# Patient Record
Sex: FEMALE | Race: WHITE | Employment: OTHER
[De-identification: names, ages, dates, MRNs, and addresses within clinical notes are randomized per-mention and may not be internally consistent; named-entity substitution may affect disease eponyms.]

---

## 2017-01-01 ENCOUNTER — SURGERY (OUTPATIENT)
Age: 67
End: 2017-01-01

## 2017-01-01 ENCOUNTER — TELEPHONE (OUTPATIENT)
Dept: GASTROENTEROLOGY | Facility: CLINIC | Age: 67
End: 2017-01-01

## 2017-01-01 ENCOUNTER — ANESTHESIA EVENT (OUTPATIENT)
Dept: SURGERY | Facility: CLINIC | Age: 67
End: 2017-01-01
Payer: MEDICARE

## 2017-01-01 ENCOUNTER — HOSPITAL ENCOUNTER (OUTPATIENT)
Facility: CLINIC | Age: 67
End: 2017-01-01
Attending: INTERNAL MEDICINE | Admitting: INTERNAL MEDICINE
Payer: MEDICARE

## 2017-01-01 ENCOUNTER — ANESTHESIA (OUTPATIENT)
Dept: SURGERY | Facility: CLINIC | Age: 67
End: 2017-01-01
Payer: MEDICARE

## 2017-01-01 ENCOUNTER — TRANSFERRED RECORDS (OUTPATIENT)
Dept: HEALTH INFORMATION MANAGEMENT | Facility: CLINIC | Age: 67
End: 2017-01-01

## 2017-01-01 ENCOUNTER — HOSPITAL ENCOUNTER (OUTPATIENT)
Facility: CLINIC | Age: 67
Discharge: HOME | End: 2017-08-10
Attending: INTERNAL MEDICINE | Admitting: INTERNAL MEDICINE
Payer: MEDICARE

## 2017-01-01 ENCOUNTER — CARE COORDINATION (OUTPATIENT)
Dept: GASTROENTEROLOGY | Facility: CLINIC | Age: 67
End: 2017-01-01

## 2017-01-01 ENCOUNTER — HOSPITAL ENCOUNTER (OUTPATIENT)
Facility: CLINIC | Age: 67
Discharge: HOME | End: 2017-08-28
Attending: INTERNAL MEDICINE | Admitting: INTERNAL MEDICINE
Payer: MEDICARE

## 2017-01-01 ENCOUNTER — OFFICE VISIT (OUTPATIENT)
Dept: GASTROENTEROLOGY | Facility: CLINIC | Age: 67
End: 2017-01-01
Attending: INTERNAL MEDICINE
Payer: MEDICARE

## 2017-01-01 ENCOUNTER — APPOINTMENT (OUTPATIENT)
Dept: GENERAL RADIOLOGY | Facility: CLINIC | Age: 67
End: 2017-01-01
Attending: INTERNAL MEDICINE
Payer: MEDICARE

## 2017-01-01 ENCOUNTER — ANESTHESIA EVENT (OUTPATIENT)
Dept: GASTROENTEROLOGY | Facility: CLINIC | Age: 67
End: 2017-01-01
Payer: MEDICARE

## 2017-01-01 ENCOUNTER — HOSPITAL ENCOUNTER (OUTPATIENT)
Facility: CLINIC | Age: 67
Discharge: HOME | End: 2017-07-05
Attending: INTERNAL MEDICINE | Admitting: INTERNAL MEDICINE
Payer: MEDICARE

## 2017-01-01 ENCOUNTER — ANESTHESIA (OUTPATIENT)
Dept: GASTROENTEROLOGY | Facility: CLINIC | Age: 67
End: 2017-01-01
Payer: MEDICARE

## 2017-01-01 VITALS
HEIGHT: 64 IN | BODY MASS INDEX: 34.97 KG/M2 | SYSTOLIC BLOOD PRESSURE: 116 MMHG | DIASTOLIC BLOOD PRESSURE: 78 MMHG | OXYGEN SATURATION: 94 % | TEMPERATURE: 97.2 F | RESPIRATION RATE: 16 BRPM | WEIGHT: 204.81 LBS

## 2017-01-01 VITALS
SYSTOLIC BLOOD PRESSURE: 127 MMHG | OXYGEN SATURATION: 94 % | DIASTOLIC BLOOD PRESSURE: 71 MMHG | TEMPERATURE: 98.4 F | HEART RATE: 62 BPM | HEIGHT: 64 IN | WEIGHT: 196.65 LBS | RESPIRATION RATE: 16 BRPM | BODY MASS INDEX: 33.57 KG/M2

## 2017-01-01 VITALS
WEIGHT: 195 LBS | HEART RATE: 77 BPM | RESPIRATION RATE: 16 BRPM | SYSTOLIC BLOOD PRESSURE: 148 MMHG | TEMPERATURE: 98.5 F | DIASTOLIC BLOOD PRESSURE: 89 MMHG | BODY MASS INDEX: 33.29 KG/M2 | HEIGHT: 64 IN | OXYGEN SATURATION: 98 %

## 2017-01-01 VITALS
HEART RATE: 60 BPM | HEIGHT: 64 IN | RESPIRATION RATE: 17 BRPM | SYSTOLIC BLOOD PRESSURE: 118 MMHG | BODY MASS INDEX: 34.16 KG/M2 | DIASTOLIC BLOOD PRESSURE: 50 MMHG | WEIGHT: 200.1 LBS | OXYGEN SATURATION: 95 % | TEMPERATURE: 97.5 F

## 2017-01-01 DIAGNOSIS — K83.1 BILIARY STRICTURE OF TRANSPLANTED LIVER (H): Primary | ICD-10-CM

## 2017-01-01 DIAGNOSIS — T86.49: Primary | ICD-10-CM

## 2017-01-01 DIAGNOSIS — K74.3 PRIMARY BILIARY CHOLANGITIS (H): Primary | ICD-10-CM

## 2017-01-01 DIAGNOSIS — Z94.4 LIVER TRANSPLANTED (H): Primary | ICD-10-CM

## 2017-01-01 DIAGNOSIS — I85.11 SECONDARY ESOPHAGEAL VARICES WITH BLEEDING (H): Primary | ICD-10-CM

## 2017-01-01 DIAGNOSIS — Z94.4 LIVER REPLACED BY TRANSPLANT (H): ICD-10-CM

## 2017-01-01 DIAGNOSIS — Z94.4 LIVER REPLACED BY TRANSPLANT (H): Primary | ICD-10-CM

## 2017-01-01 DIAGNOSIS — T86.49 BILIARY STRICTURE OF TRANSPLANTED LIVER (H): Primary | ICD-10-CM

## 2017-01-01 DIAGNOSIS — M19.019 SHOULDER ARTHRITIS: ICD-10-CM

## 2017-01-01 DIAGNOSIS — K83.09 CHOLANGITIS (H): Primary | ICD-10-CM

## 2017-01-01 DIAGNOSIS — K83.1: Primary | ICD-10-CM

## 2017-01-01 LAB
ALBUMIN SERPL-MCNC: 3.2 G/DL (ref 3.4–5)
ALBUMIN SERPL-MCNC: 3.3 G/DL (ref 3.4–5)
ALBUMIN SERPL-MCNC: 3.4 G/DL (ref 3.4–5)
ALBUMIN SERPL-MCNC: 3.7 G/DL (ref 3.4–5)
ALBUMIN UR-MCNC: NEGATIVE MG/DL
ALP SERPL-CCNC: 148 U/L (ref 40–150)
ALP SERPL-CCNC: 157 U/L (ref 40–150)
ALP SERPL-CCNC: 161 U/L (ref 40–150)
ALP SERPL-CCNC: 266 U/L (ref 40–150)
ALT SERPL W P-5'-P-CCNC: 33 U/L (ref 0–50)
ALT SERPL W P-5'-P-CCNC: 34 U/L (ref 0–50)
ALT SERPL W P-5'-P-CCNC: 37 U/L (ref 0–50)
ALT SERPL W P-5'-P-CCNC: 48 U/L (ref 0–50)
AMYLASE SERPL-CCNC: 34 U/L (ref 30–110)
AMYLASE SERPL-CCNC: 34 U/L (ref 30–110)
ANION GAP SERPL CALCULATED.3IONS-SCNC: 10 MMOL/L (ref 3–14)
ANION GAP SERPL CALCULATED.3IONS-SCNC: 6 MMOL/L (ref 3–14)
ANION GAP SERPL CALCULATED.3IONS-SCNC: 9 MMOL/L (ref 3–14)
ANION GAP SERPL CALCULATED.3IONS-SCNC: 9 MMOL/L (ref 3–14)
APPEARANCE UR: CLEAR
AST SERPL W P-5'-P-CCNC: 29 U/L (ref 0–45)
AST SERPL W P-5'-P-CCNC: 29 U/L (ref 0–45)
AST SERPL W P-5'-P-CCNC: 30 U/L (ref 0–45)
AST SERPL W P-5'-P-CCNC: 44 U/L (ref 0–45)
BILIRUB DIRECT SERPL-MCNC: 0.3 MG/DL (ref 0–0.2)
BILIRUB DIRECT SERPL-MCNC: 0.9 MG/DL (ref 0–0.2)
BILIRUB SERPL-MCNC: 0.7 MG/DL (ref 0.2–1.3)
BILIRUB SERPL-MCNC: 0.8 MG/DL (ref 0.2–1.3)
BILIRUB SERPL-MCNC: 0.9 MG/DL (ref 0.2–1.3)
BILIRUB SERPL-MCNC: 1.4 MG/DL (ref 0.2–1.3)
BILIRUB UR QL STRIP: NEGATIVE
BUN SERPL-MCNC: 16 MG/DL (ref 7–30)
BUN SERPL-MCNC: 18 MG/DL (ref 7–30)
BUN SERPL-MCNC: 22 MG/DL (ref 7–30)
BUN SERPL-MCNC: 22 MG/DL (ref 7–30)
CALCIUM SERPL-MCNC: 8.3 MG/DL (ref 8.5–10.1)
CALCIUM SERPL-MCNC: 8.6 MG/DL (ref 8.5–10.1)
CALCIUM SERPL-MCNC: 9.1 MG/DL (ref 8.5–10.1)
CALCIUM SERPL-MCNC: 9.5 MG/DL (ref 8.5–10.1)
CHLORIDE SERPL-SCNC: 100 MMOL/L (ref 94–109)
CHLORIDE SERPL-SCNC: 100 MMOL/L (ref 94–109)
CHLORIDE SERPL-SCNC: 101 MMOL/L (ref 94–109)
CHLORIDE SERPL-SCNC: 98 MMOL/L (ref 94–109)
CHOLEST SERPL-MCNC: 151 MG/DL
CO2 SERPL-SCNC: 25 MMOL/L (ref 20–32)
CO2 SERPL-SCNC: 26 MMOL/L (ref 20–32)
CO2 SERPL-SCNC: 27 MMOL/L (ref 20–32)
CO2 SERPL-SCNC: 29 MMOL/L (ref 20–32)
COLOR UR AUTO: NORMAL
CREAT SERPL-MCNC: 0.98 MG/DL (ref 0.52–1.04)
CREAT SERPL-MCNC: 0.99 MG/DL (ref 0.52–1.04)
CREAT SERPL-MCNC: 0.99 MG/DL (ref 0.52–1.04)
CREAT SERPL-MCNC: 1.04 MG/DL (ref 0.52–1.04)
ERCP: NORMAL
ERCP: NORMAL
ERYTHROCYTE [DISTWIDTH] IN BLOOD BY AUTOMATED COUNT: 15.9 % (ref 10–15)
ERYTHROCYTE [DISTWIDTH] IN BLOOD BY AUTOMATED COUNT: 16.1 % (ref 10–15)
ERYTHROCYTE [DISTWIDTH] IN BLOOD BY AUTOMATED COUNT: 16.2 % (ref 10–15)
ERYTHROCYTE [DISTWIDTH] IN BLOOD BY AUTOMATED COUNT: 16.5 % (ref 10–15)
GFR SERPL CREATININE-BSD FRML MDRD: 53 ML/MIN/1.7M2
GFR SERPL CREATININE-BSD FRML MDRD: 56 ML/MIN/1.7M2
GFR SERPL CREATININE-BSD FRML MDRD: 56 ML/MIN/1.7M2
GFR SERPL CREATININE-BSD FRML MDRD: 57 ML/MIN/1.7M2
GLUCOSE BLDC GLUCOMTR-MCNC: 101 MG/DL (ref 70–99)
GLUCOSE BLDC GLUCOMTR-MCNC: 126 MG/DL (ref 70–99)
GLUCOSE BLDC GLUCOMTR-MCNC: 130 MG/DL (ref 70–99)
GLUCOSE BLDC GLUCOMTR-MCNC: 148 MG/DL (ref 70–99)
GLUCOSE BLDC GLUCOMTR-MCNC: 191 MG/DL (ref 70–99)
GLUCOSE BLDC GLUCOMTR-MCNC: 95 MG/DL (ref 70–99)
GLUCOSE SERPL-MCNC: 103 MG/DL (ref 70–99)
GLUCOSE SERPL-MCNC: 104 MG/DL (ref 70–99)
GLUCOSE SERPL-MCNC: 168 MG/DL (ref 70–99)
GLUCOSE SERPL-MCNC: 203 MG/DL (ref 70–99)
GLUCOSE UR STRIP-MCNC: NEGATIVE MG/DL
HCT VFR BLD AUTO: 33.4 % (ref 35–47)
HCT VFR BLD AUTO: 36.1 % (ref 35–47)
HCT VFR BLD AUTO: 36.4 % (ref 35–47)
HCT VFR BLD AUTO: 37 % (ref 35–47)
HDLC SERPL-MCNC: 58 MG/DL
HGB BLD-MCNC: 10.7 G/DL (ref 11.7–15.7)
HGB BLD-MCNC: 11.4 G/DL (ref 11.7–15.7)
HGB BLD-MCNC: 11.7 G/DL (ref 11.7–15.7)
HGB BLD-MCNC: 11.7 G/DL (ref 11.7–15.7)
HGB UR QL STRIP: NEGATIVE
INR PPP: 1.24 (ref 0.86–1.14)
KETONES UR STRIP-MCNC: NEGATIVE MG/DL
LDLC SERPL CALC-MCNC: 69 MG/DL
LEUKOCYTE ESTERASE UR QL STRIP: NEGATIVE
LIPASE SERPL-CCNC: 223 U/L (ref 73–393)
LIPASE SERPL-CCNC: 235 U/L (ref 73–393)
MCH RBC QN AUTO: 26.5 PG (ref 26.5–33)
MCH RBC QN AUTO: 26.6 PG (ref 26.5–33)
MCH RBC QN AUTO: 26.6 PG (ref 26.5–33)
MCH RBC QN AUTO: 27.5 PG (ref 26.5–33)
MCHC RBC AUTO-ENTMCNC: 31.6 G/DL (ref 31.5–36.5)
MCHC RBC AUTO-ENTMCNC: 31.6 G/DL (ref 31.5–36.5)
MCHC RBC AUTO-ENTMCNC: 32 G/DL (ref 31.5–36.5)
MCHC RBC AUTO-ENTMCNC: 32.1 G/DL (ref 31.5–36.5)
MCV RBC AUTO: 83 FL (ref 78–100)
MCV RBC AUTO: 84 FL (ref 78–100)
MCV RBC AUTO: 84 FL (ref 78–100)
MCV RBC AUTO: 85 FL (ref 78–100)
NITRATE UR QL: NEGATIVE
NONHDLC SERPL-MCNC: 93 MG/DL
PH UR STRIP: 7 PH (ref 5–7)
PLATELET # BLD AUTO: 63 10E9/L (ref 150–450)
PLATELET # BLD AUTO: 64 10E9/L (ref 150–450)
PLATELET # BLD AUTO: 64 10E9/L (ref 150–450)
PLATELET # BLD AUTO: 70 10E9/L (ref 150–450)
POTASSIUM SERPL-SCNC: 3.9 MMOL/L (ref 3.4–5.3)
POTASSIUM SERPL-SCNC: 3.9 MMOL/L (ref 3.4–5.3)
POTASSIUM SERPL-SCNC: 4 MMOL/L (ref 3.4–5.3)
POTASSIUM SERPL-SCNC: 4.1 MMOL/L (ref 3.4–5.3)
PROT SERPL-MCNC: 6.1 G/DL (ref 6.8–8.8)
PROT SERPL-MCNC: 6.4 G/DL (ref 6.8–8.8)
PROT SERPL-MCNC: 6.8 G/DL (ref 6.8–8.8)
PROT SERPL-MCNC: 6.8 G/DL (ref 6.8–8.8)
PROT UR-MCNC: 0.05 G/L
PROT/CREAT 24H UR: 0.3 G/G CR (ref 0–0.2)
RBC # BLD AUTO: 4.03 10E12/L (ref 3.8–5.2)
RBC # BLD AUTO: 4.26 10E12/L (ref 3.8–5.2)
RBC # BLD AUTO: 4.28 10E12/L (ref 3.8–5.2)
RBC # BLD AUTO: 4.42 10E12/L (ref 3.8–5.2)
RBC #/AREA URNS AUTO: 0 /HPF (ref 0–2)
SODIUM SERPL-SCNC: 134 MMOL/L (ref 133–144)
SODIUM SERPL-SCNC: 135 MMOL/L (ref 133–144)
SODIUM SERPL-SCNC: 135 MMOL/L (ref 133–144)
SODIUM SERPL-SCNC: 136 MMOL/L (ref 133–144)
SP GR UR STRIP: 1 (ref 1–1.03)
TRANS CELLS #/AREA URNS HPF: <1 /HPF (ref 0–1)
TRIGL SERPL-MCNC: 118 MG/DL
UPPER GI ENDOSCOPY: NORMAL
URN SPEC COLLECT METH UR: NORMAL
UROBILINOGEN UR STRIP-MCNC: NORMAL MG/DL (ref 0–2)
WBC # BLD AUTO: 3.3 10E9/L (ref 4–11)
WBC # BLD AUTO: 3.5 10E9/L (ref 4–11)
WBC # BLD AUTO: 4.2 10E9/L (ref 4–11)
WBC # BLD AUTO: 4.7 10E9/L (ref 4–11)
WBC #/AREA URNS AUTO: <1 /HPF (ref 0–2)

## 2017-01-01 PROCEDURE — 36415 COLL VENOUS BLD VENIPUNCTURE: CPT | Performed by: INTERNAL MEDICINE

## 2017-01-01 PROCEDURE — 37000008 ZZH ANESTHESIA TECHNICAL FEE, 1ST 30 MIN: Performed by: INTERNAL MEDICINE

## 2017-01-01 PROCEDURE — 71000014 ZZH RECOVERY PHASE 1 LEVEL 2 FIRST HR: Performed by: INTERNAL MEDICINE

## 2017-01-01 PROCEDURE — 40000277 XR SURGERY CARM FLUORO LESS THAN 5 MIN W STILLS: Mod: TC

## 2017-01-01 PROCEDURE — 25500064 ZZH RX 255 OP 636: Performed by: INTERNAL MEDICINE

## 2017-01-01 PROCEDURE — 85027 COMPLETE CBC AUTOMATED: CPT | Performed by: INTERNAL MEDICINE

## 2017-01-01 PROCEDURE — 27210794 ZZH OR GENERAL SUPPLY STERILE: Performed by: INTERNAL MEDICINE

## 2017-01-01 PROCEDURE — 80053 COMPREHEN METABOLIC PANEL: CPT | Performed by: INTERNAL MEDICINE

## 2017-01-01 PROCEDURE — 43235 EGD DIAGNOSTIC BRUSH WASH: CPT | Performed by: INTERNAL MEDICINE

## 2017-01-01 PROCEDURE — C9399 UNCLASSIFIED DRUGS OR BIOLOG: HCPCS | Performed by: NURSE ANESTHETIST, CERTIFIED REGISTERED

## 2017-01-01 PROCEDURE — C1726 CATH, BAL DIL, NON-VASCULAR: HCPCS | Performed by: INTERNAL MEDICINE

## 2017-01-01 PROCEDURE — 25000128 H RX IP 250 OP 636: Performed by: NURSE ANESTHETIST, CERTIFIED REGISTERED

## 2017-01-01 PROCEDURE — 82962 GLUCOSE BLOOD TEST: CPT

## 2017-01-01 PROCEDURE — 25000125 ZZHC RX 250: Mod: GY | Performed by: INTERNAL MEDICINE

## 2017-01-01 PROCEDURE — 25000566 ZZH SEVOFLURANE, EA 15 MIN: Performed by: INTERNAL MEDICINE

## 2017-01-01 PROCEDURE — 85610 PROTHROMBIN TIME: CPT | Performed by: INTERNAL MEDICINE

## 2017-01-01 PROCEDURE — 82150 ASSAY OF AMYLASE: CPT | Performed by: INTERNAL MEDICINE

## 2017-01-01 PROCEDURE — 36000061 ZZH SURGERY LEVEL 3 W FLUORO 1ST 30 MIN - UMMC: Performed by: INTERNAL MEDICINE

## 2017-01-01 PROCEDURE — 80076 HEPATIC FUNCTION PANEL: CPT | Performed by: INTERNAL MEDICINE

## 2017-01-01 PROCEDURE — 36000059 ZZH SURGERY LEVEL 3 EA 15 ADDTL MIN UMMC: Performed by: INTERNAL MEDICINE

## 2017-01-01 PROCEDURE — 99213 OFFICE O/P EST LOW 20 MIN: CPT | Mod: ZF

## 2017-01-01 PROCEDURE — 37000009 ZZH ANESTHESIA TECHNICAL FEE, EACH ADDTL 15 MIN: Performed by: INTERNAL MEDICINE

## 2017-01-01 PROCEDURE — 36000055 ZZH SURGERY LEVEL 2 W FLUORO 1ST 30 MIN - UMMC: Performed by: INTERNAL MEDICINE

## 2017-01-01 PROCEDURE — 40000170 ZZH STATISTIC PRE-PROCEDURE ASSESSMENT II: Performed by: INTERNAL MEDICINE

## 2017-01-01 PROCEDURE — 80048 BASIC METABOLIC PNL TOTAL CA: CPT | Performed by: INTERNAL MEDICINE

## 2017-01-01 PROCEDURE — 27211024 ZZHC OR SUPPLY OTHER OPNP: Performed by: INTERNAL MEDICINE

## 2017-01-01 PROCEDURE — C1769 GUIDE WIRE: HCPCS | Performed by: INTERNAL MEDICINE

## 2017-01-01 PROCEDURE — 71000027 ZZH RECOVERY PHASE 2 EACH 15 MINS: Performed by: INTERNAL MEDICINE

## 2017-01-01 PROCEDURE — 83690 ASSAY OF LIPASE: CPT | Performed by: INTERNAL MEDICINE

## 2017-01-01 PROCEDURE — 80061 LIPID PANEL: CPT | Performed by: INTERNAL MEDICINE

## 2017-01-01 PROCEDURE — 25000125 ZZHC RX 250: Performed by: NURSE ANESTHETIST, CERTIFIED REGISTERED

## 2017-01-01 PROCEDURE — 36000053 ZZH SURGERY LEVEL 2 EA 15 ADDTL MIN - UMMC: Performed by: INTERNAL MEDICINE

## 2017-01-01 PROCEDURE — 84156 ASSAY OF PROTEIN URINE: CPT | Performed by: INTERNAL MEDICINE

## 2017-01-01 PROCEDURE — A9270 NON-COVERED ITEM OR SERVICE: HCPCS | Mod: GY | Performed by: INTERNAL MEDICINE

## 2017-01-01 PROCEDURE — 81001 URINALYSIS AUTO W/SCOPE: CPT | Performed by: INTERNAL MEDICINE

## 2017-01-01 PROCEDURE — 40000141 ZZH STATISTIC PERIPHERAL IV START W/O US GUIDANCE

## 2017-01-01 PROCEDURE — 40000280 XR SURGERY CARM FLUORO GREATER THAN 5 MIN: Mod: TC

## 2017-01-01 PROCEDURE — 25000128 H RX IP 250 OP 636: Performed by: INTERNAL MEDICINE

## 2017-01-01 PROCEDURE — C1877 STENT, NON-COAT/COV W/O DEL: HCPCS | Performed by: INTERNAL MEDICINE

## 2017-01-01 PROCEDURE — 82962 GLUCOSE BLOOD TEST: CPT | Mod: 91

## 2017-01-01 DEVICE — IMPLANTABLE DEVICE
Type: IMPLANTABLE DEVICE | Site: BILE DUCT | Status: NON-FUNCTIONAL
Removed: 2017-08-28

## 2017-01-01 DEVICE — IMPLANTABLE DEVICE: Type: IMPLANTABLE DEVICE | Site: BILE DUCT | Status: FUNCTIONAL

## 2017-01-01 RX ORDER — DEXAMETHASONE SODIUM PHOSPHATE 4 MG/ML
INJECTION, SOLUTION INTRA-ARTICULAR; INTRALESIONAL; INTRAMUSCULAR; INTRAVENOUS; SOFT TISSUE PRN
Status: DISCONTINUED | OUTPATIENT
Start: 2017-01-01 | End: 2017-01-01

## 2017-01-01 RX ORDER — FLUMAZENIL 0.1 MG/ML
0.2 INJECTION, SOLUTION INTRAVENOUS
Status: CANCELLED | OUTPATIENT
Start: 2017-01-01 | End: 2017-01-01

## 2017-01-01 RX ORDER — LIDOCAINE HYDROCHLORIDE 20 MG/ML
INJECTION, SOLUTION INFILTRATION; PERINEURAL PRN
Status: DISCONTINUED | OUTPATIENT
Start: 2017-01-01 | End: 2017-01-01

## 2017-01-01 RX ORDER — GABAPENTIN 300 MG/1
300 CAPSULE ORAL 3 TIMES DAILY
Qty: 90 CAPSULE | Refills: 0 | Status: SHIPPED | OUTPATIENT
Start: 2017-01-01 | End: 2018-01-01

## 2017-01-01 RX ORDER — ONDANSETRON 2 MG/ML
4 INJECTION INTRAMUSCULAR; INTRAVENOUS EVERY 30 MIN PRN
Status: DISCONTINUED | OUTPATIENT
Start: 2017-01-01 | End: 2017-01-01 | Stop reason: HOSPADM

## 2017-01-01 RX ORDER — SODIUM CHLORIDE, SODIUM LACTATE, POTASSIUM CHLORIDE, CALCIUM CHLORIDE 600; 310; 30; 20 MG/100ML; MG/100ML; MG/100ML; MG/100ML
INJECTION, SOLUTION INTRAVENOUS CONTINUOUS
Status: DISCONTINUED | OUTPATIENT
Start: 2017-01-01 | End: 2017-01-01 | Stop reason: HOSPADM

## 2017-01-01 RX ORDER — NALOXONE HYDROCHLORIDE 0.4 MG/ML
.1-.4 INJECTION, SOLUTION INTRAMUSCULAR; INTRAVENOUS; SUBCUTANEOUS
Status: DISCONTINUED | OUTPATIENT
Start: 2017-01-01 | End: 2017-01-01 | Stop reason: HOSPADM

## 2017-01-01 RX ORDER — SPIRONOLACTONE 25 MG/1
75 TABLET ORAL DAILY
COMMUNITY

## 2017-01-01 RX ORDER — CEPHALEXIN 500 MG/1
500 CAPSULE ORAL 3 TIMES DAILY
COMMUNITY
Start: 2017-01-01 | End: 2018-01-01

## 2017-01-01 RX ORDER — FLUMAZENIL 0.1 MG/ML
0.2 INJECTION, SOLUTION INTRAVENOUS
Status: DISCONTINUED | OUTPATIENT
Start: 2017-01-01 | End: 2017-01-01 | Stop reason: HOSPADM

## 2017-01-01 RX ORDER — ONDANSETRON 4 MG/1
4 TABLET, ORALLY DISINTEGRATING ORAL EVERY 30 MIN PRN
Status: DISCONTINUED | OUTPATIENT
Start: 2017-01-01 | End: 2017-01-01 | Stop reason: HOSPADM

## 2017-01-01 RX ORDER — ONDANSETRON 2 MG/ML
4 INJECTION INTRAMUSCULAR; INTRAVENOUS EVERY 30 MIN PRN
Status: CANCELLED | OUTPATIENT
Start: 2017-01-01

## 2017-01-01 RX ORDER — CIPROFLOXACIN 500 MG/1
500 TABLET, FILM COATED ORAL 2 TIMES DAILY
Qty: 14 TABLET | Refills: 0 | Status: SHIPPED | OUTPATIENT
Start: 2017-01-01 | End: 2017-01-01

## 2017-01-01 RX ORDER — CIPROFLOXACIN 2 MG/ML
INJECTION, SOLUTION INTRAVENOUS PRN
Status: DISCONTINUED | OUTPATIENT
Start: 2017-01-01 | End: 2017-01-01

## 2017-01-01 RX ORDER — PROPOFOL 10 MG/ML
INJECTION, EMULSION INTRAVENOUS PRN
Status: DISCONTINUED | OUTPATIENT
Start: 2017-01-01 | End: 2017-01-01

## 2017-01-01 RX ORDER — PREDNISONE 5 MG/1
5 TABLET ORAL DAILY
Qty: 30 TABLET | Refills: 11 | Status: SHIPPED | OUTPATIENT
Start: 2017-01-01 | End: 2018-01-01

## 2017-01-01 RX ORDER — EPHEDRINE SULFATE 50 MG/ML
INJECTION, SOLUTION INTRAMUSCULAR; INTRAVENOUS; SUBCUTANEOUS PRN
Status: DISCONTINUED | OUTPATIENT
Start: 2017-01-01 | End: 2017-01-01

## 2017-01-01 RX ORDER — CIPROFLOXACIN 500 MG/1
500 TABLET, FILM COATED ORAL 2 TIMES DAILY
Qty: 5 TABLET | Refills: 0 | Status: SHIPPED | OUTPATIENT
Start: 2017-01-01 | End: 2017-01-01

## 2017-01-01 RX ORDER — LIDOCAINE 40 MG/G
CREAM TOPICAL
Status: DISCONTINUED | OUTPATIENT
Start: 2017-01-01 | End: 2017-01-01 | Stop reason: HOSPADM

## 2017-01-01 RX ORDER — FENTANYL CITRATE 50 UG/ML
25-50 INJECTION, SOLUTION INTRAMUSCULAR; INTRAVENOUS
Status: DISCONTINUED | OUTPATIENT
Start: 2017-01-01 | End: 2017-01-01 | Stop reason: HOSPADM

## 2017-01-01 RX ORDER — FENTANYL CITRATE 50 UG/ML
INJECTION, SOLUTION INTRAMUSCULAR; INTRAVENOUS PRN
Status: DISCONTINUED | OUTPATIENT
Start: 2017-01-01 | End: 2017-01-01

## 2017-01-01 RX ORDER — ONDANSETRON 4 MG/1
4 TABLET, ORALLY DISINTEGRATING ORAL EVERY 30 MIN PRN
Status: CANCELLED | OUTPATIENT
Start: 2017-01-01

## 2017-01-01 RX ORDER — SODIUM CHLORIDE, SODIUM LACTATE, POTASSIUM CHLORIDE, CALCIUM CHLORIDE 600; 310; 30; 20 MG/100ML; MG/100ML; MG/100ML; MG/100ML
INJECTION, SOLUTION INTRAVENOUS CONTINUOUS PRN
Status: DISCONTINUED | OUTPATIENT
Start: 2017-01-01 | End: 2017-01-01

## 2017-01-01 RX ORDER — LABETALOL HYDROCHLORIDE 5 MG/ML
10 INJECTION, SOLUTION INTRAVENOUS
Status: DISCONTINUED | OUTPATIENT
Start: 2017-01-01 | End: 2017-01-01 | Stop reason: HOSPADM

## 2017-01-01 RX ORDER — SODIUM CHLORIDE 9 MG/ML
INJECTION, SOLUTION INTRAVENOUS CONTINUOUS PRN
Status: DISCONTINUED | OUTPATIENT
Start: 2017-01-01 | End: 2017-01-01

## 2017-01-01 RX ORDER — CIPROFLOXACIN 2 MG/ML
400 INJECTION, SOLUTION INTRAVENOUS ONCE
Status: DISCONTINUED | OUTPATIENT
Start: 2017-01-01 | End: 2017-01-01 | Stop reason: HOSPADM

## 2017-01-01 RX ORDER — NALOXONE HYDROCHLORIDE 0.4 MG/ML
.1-.4 INJECTION, SOLUTION INTRAMUSCULAR; INTRAVENOUS; SUBCUTANEOUS
Status: CANCELLED | OUTPATIENT
Start: 2017-01-01 | End: 2017-01-01

## 2017-01-01 RX ORDER — GABAPENTIN 300 MG/1
CAPSULE ORAL
Qty: 90 CAPSULE | Refills: 0 | Status: SHIPPED | OUTPATIENT
Start: 2017-01-01 | End: 2017-01-01

## 2017-01-01 RX ORDER — IOPAMIDOL 755 MG/ML
INJECTION, SOLUTION INTRAVASCULAR PRN
Status: DISCONTINUED | OUTPATIENT
Start: 2017-01-01 | End: 2017-01-01 | Stop reason: HOSPADM

## 2017-01-01 RX ORDER — IOPAMIDOL 510 MG/ML
INJECTION, SOLUTION INTRAVASCULAR PRN
Status: DISCONTINUED | OUTPATIENT
Start: 2017-01-01 | End: 2017-01-01 | Stop reason: HOSPADM

## 2017-01-01 RX ORDER — ONDANSETRON 2 MG/ML
INJECTION INTRAMUSCULAR; INTRAVENOUS PRN
Status: DISCONTINUED | OUTPATIENT
Start: 2017-01-01 | End: 2017-01-01

## 2017-01-01 RX ORDER — SODIUM CHLORIDE, SODIUM LACTATE, POTASSIUM CHLORIDE, CALCIUM CHLORIDE 600; 310; 30; 20 MG/100ML; MG/100ML; MG/100ML; MG/100ML
INJECTION, SOLUTION INTRAVENOUS CONTINUOUS
Status: CANCELLED | OUTPATIENT
Start: 2017-01-01

## 2017-01-01 RX ORDER — INDOMETHACIN 50 MG/1
100 SUPPOSITORY RECTAL
Status: DISCONTINUED | OUTPATIENT
Start: 2017-01-01 | End: 2017-01-01 | Stop reason: HOSPADM

## 2017-01-01 RX ORDER — GLYCOPYRROLATE 0.2 MG/ML
INJECTION, SOLUTION INTRAMUSCULAR; INTRAVENOUS PRN
Status: DISCONTINUED | OUTPATIENT
Start: 2017-01-01 | End: 2017-01-01

## 2017-01-01 RX ORDER — FERROUS GLUCONATE 324(38)MG
1 TABLET ORAL
Qty: 90 TABLET | Refills: 1 | Status: SHIPPED | OUTPATIENT
Start: 2017-01-01 | End: 2018-01-01

## 2017-01-01 RX ADMIN — PHENYLEPHRINE HYDROCHLORIDE 200 MCG: 10 INJECTION, SOLUTION INTRAMUSCULAR; INTRAVENOUS; SUBCUTANEOUS at 11:08

## 2017-01-01 RX ADMIN — PROPOFOL 20 MG: 10 INJECTION, EMULSION INTRAVENOUS at 08:46

## 2017-01-01 RX ADMIN — SODIUM CHLORIDE, POTASSIUM CHLORIDE, SODIUM LACTATE AND CALCIUM CHLORIDE: 600; 310; 30; 20 INJECTION, SOLUTION INTRAVENOUS at 08:43

## 2017-01-01 RX ADMIN — PROPOFOL 50 MG: 10 INJECTION, EMULSION INTRAVENOUS at 08:21

## 2017-01-01 RX ADMIN — Medication 5 MG: at 10:55

## 2017-01-01 RX ADMIN — Medication 0.1 MG: at 09:05

## 2017-01-01 RX ADMIN — Medication 5 MG: at 08:58

## 2017-01-01 RX ADMIN — PHENYLEPHRINE HYDROCHLORIDE 200 MCG: 10 INJECTION, SOLUTION INTRAMUSCULAR; INTRAVENOUS; SUBCUTANEOUS at 11:10

## 2017-01-01 RX ADMIN — Medication 10 MG: at 10:39

## 2017-01-01 RX ADMIN — ROCURONIUM BROMIDE 50 MG: 10 INJECTION INTRAVENOUS at 10:19

## 2017-01-01 RX ADMIN — LIDOCAINE HYDROCHLORIDE 100 MG: 20 INJECTION, SOLUTION INFILTRATION; PERINEURAL at 08:21

## 2017-01-01 RX ADMIN — PROPOFOL 80 MG: 10 INJECTION, EMULSION INTRAVENOUS at 09:15

## 2017-01-01 RX ADMIN — SUGAMMADEX 200 MG: 100 INJECTION, SOLUTION INTRAVENOUS at 11:32

## 2017-01-01 RX ADMIN — PHENYLEPHRINE HYDROCHLORIDE 100 MCG: 10 INJECTION, SOLUTION INTRAMUSCULAR; INTRAVENOUS; SUBCUTANEOUS at 10:55

## 2017-01-01 RX ADMIN — Medication 5 MG: at 09:01

## 2017-01-01 RX ADMIN — PHENYLEPHRINE HYDROCHLORIDE 50 MCG: 10 INJECTION, SOLUTION INTRAMUSCULAR; INTRAVENOUS; SUBCUTANEOUS at 09:05

## 2017-01-01 RX ADMIN — FENTANYL CITRATE 100 MCG: 50 INJECTION, SOLUTION INTRAMUSCULAR; INTRAVENOUS at 10:19

## 2017-01-01 RX ADMIN — IOPAMIDOL 30 ML: 510 INJECTION, SOLUTION INTRAVASCULAR at 09:19

## 2017-01-01 RX ADMIN — PROPOFOL 50 MG: 10 INJECTION, EMULSION INTRAVENOUS at 10:19

## 2017-01-01 RX ADMIN — Medication 5 MG: at 08:48

## 2017-01-01 RX ADMIN — SODIUM CHLORIDE: 9 INJECTION, SOLUTION INTRAVENOUS at 09:01

## 2017-01-01 RX ADMIN — ONDANSETRON 4 MG: 2 INJECTION INTRAMUSCULAR; INTRAVENOUS at 09:11

## 2017-01-01 RX ADMIN — LIDOCAINE HYDROCHLORIDE 80 MG: 20 INJECTION, SOLUTION INFILTRATION; PERINEURAL at 10:19

## 2017-01-01 RX ADMIN — PHENYLEPHRINE HYDROCHLORIDE 200 MCG: 10 INJECTION, SOLUTION INTRAMUSCULAR; INTRAVENOUS; SUBCUTANEOUS at 10:39

## 2017-01-01 RX ADMIN — PHENYLEPHRINE HYDROCHLORIDE 200 MCG: 10 INJECTION, SOLUTION INTRAMUSCULAR; INTRAVENOUS; SUBCUTANEOUS at 11:00

## 2017-01-01 RX ADMIN — Medication 5 MG: at 10:58

## 2017-01-01 RX ADMIN — CIPROFLOXACIN 400 MG: 2 INJECTION INTRAVENOUS at 08:30

## 2017-01-01 RX ADMIN — PHENYLEPHRINE HYDROCHLORIDE 200 MCG: 10 INJECTION, SOLUTION INTRAMUSCULAR; INTRAVENOUS; SUBCUTANEOUS at 10:58

## 2017-01-01 RX ADMIN — SODIUM CHLORIDE, POTASSIUM CHLORIDE, SODIUM LACTATE AND CALCIUM CHLORIDE: 600; 310; 30; 20 INJECTION, SOLUTION INTRAVENOUS at 10:10

## 2017-01-01 RX ADMIN — DEXAMETHASONE SODIUM PHOSPHATE 6 MG: 4 INJECTION, SOLUTION INTRA-ARTICULAR; INTRALESIONAL; INTRAMUSCULAR; INTRAVENOUS; SOFT TISSUE at 10:37

## 2017-01-01 RX ADMIN — Medication 5 MG: at 08:39

## 2017-01-01 RX ADMIN — SIMETHICONE 2 ML: 63.3; 3.7 SOLUTION/ DROPS ORAL at 08:52

## 2017-01-01 RX ADMIN — PHENYLEPHRINE HYDROCHLORIDE 100 MCG: 10 INJECTION, SOLUTION INTRAMUSCULAR; INTRAVENOUS; SUBCUTANEOUS at 11:24

## 2017-01-01 RX ADMIN — PHENYLEPHRINE HYDROCHLORIDE 100 MCG: 10 INJECTION, SOLUTION INTRAMUSCULAR; INTRAVENOUS; SUBCUTANEOUS at 11:29

## 2017-01-01 RX ADMIN — ONDANSETRON 4 MG: 2 INJECTION INTRAMUSCULAR; INTRAVENOUS at 11:19

## 2017-01-01 RX ADMIN — PHENYLEPHRINE HYDROCHLORIDE 100 MCG: 10 INJECTION, SOLUTION INTRAMUSCULAR; INTRAVENOUS; SUBCUTANEOUS at 10:36

## 2017-01-01 RX ADMIN — Medication 10 MG: at 11:07

## 2017-01-01 RX ADMIN — SUGAMMADEX 200 MG: 100 INJECTION, SOLUTION INTRAVENOUS at 09:22

## 2017-01-01 RX ADMIN — CIPROFLOXACIN 400 MG: 2 INJECTION INTRAVENOUS at 10:30

## 2017-01-01 RX ADMIN — ROCURONIUM BROMIDE 50 MG: 10 INJECTION INTRAVENOUS at 08:21

## 2017-01-01 RX ADMIN — Medication 5 MG: at 10:36

## 2017-01-01 RX ADMIN — IOPAMIDOL 50 ML: 755 INJECTION, SOLUTION INTRAVENOUS at 11:32

## 2017-01-01 RX ADMIN — PHENYLEPHRINE HYDROCHLORIDE 200 MCG: 10 INJECTION, SOLUTION INTRAMUSCULAR; INTRAVENOUS; SUBCUTANEOUS at 10:56

## 2017-01-01 RX ADMIN — FENTANYL CITRATE 100 MCG: 50 INJECTION, SOLUTION INTRAMUSCULAR; INTRAVENOUS at 08:21

## 2017-01-01 RX ADMIN — PROPOFOL 30 MG: 10 INJECTION, EMULSION INTRAVENOUS at 09:18

## 2017-01-01 RX ADMIN — Medication 5 MG: at 08:37

## 2017-01-01 ASSESSMENT — LIFESTYLE VARIABLES: TOBACCO_USE: 1

## 2017-01-01 ASSESSMENT — PAIN SCALES - GENERAL: PAINLEVEL: SEVERE PAIN (6)

## 2017-01-16 DIAGNOSIS — Z96.612 S/P SHOULDER REPLACEMENT, LEFT: Primary | ICD-10-CM

## 2017-01-18 ENCOUNTER — OFFICE VISIT (OUTPATIENT)
Dept: ORTHOPEDICS | Facility: CLINIC | Age: 67
End: 2017-01-18

## 2017-01-18 ENCOUNTER — TELEPHONE (OUTPATIENT)
Dept: ORTHOPEDICS | Facility: CLINIC | Age: 67
End: 2017-01-18

## 2017-01-18 VITALS — BODY MASS INDEX: 32.18 KG/M2 | HEIGHT: 64 IN | WEIGHT: 188.5 LBS

## 2017-01-18 DIAGNOSIS — Z96.612 S/P SHOULDER REPLACEMENT, LEFT: Primary | ICD-10-CM

## 2017-01-18 NOTE — NURSING NOTE
"Reason For Visit:   Chief Complaint   Patient presents with     Surgical Followup     s/p Left Reversed Total Shoulder Arthroplasty on 12/6/2016        PCP: Yvon Lynn  Ref: Dr. Callejas    ?  No  Currently working? No.  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  Smoker: No      Right hand dominant    [facility] Shoulder Score Questionnaire     [facility] Shoulder Assessment 1/18/2017   Affected Shoulder Left   On a scale from zero to 100, how would you rate your RIGHT shoulder today (100 being normal) 50   On a scale from zero to 100, how would you rate your LEFT shoulder today (100 being normal) 80          Ht 1.626 m (5' 4\")  Wt 85.503 kg (188 lb 8 oz)  BMI 32.34 kg/m2      Pain Assessment  Patient Currently in Pain: Yes  0-10 Pain Scale: 5  Primary Pain Location: Shoulder  Pain Orientation: Left  Pain Descriptors: Tightness  Alleviating Factors: Pain medication, Ice  Aggravating Factors: Movement    "

## 2017-01-18 NOTE — Clinical Note
1/18/2017       RE: Miriam Magana  [address]  [place] [address]     Dear Colleague,    Thank you for referring your patient, Miriam Magana, to the [facility] at [facility]. Please see a copy of my visit note below.    CHIEF CONCERN: Status post left reverse total shoulder arthroplasty  DATE OF SURGERY: 12/6/16    HISTORY OF PRESENT ILLNESS: Mrs. Magana returns now 6 weeks status post the above procedure. She is very pleased with the improvement in her pain. Her primary care physician is asking when she can have her right shoulder surgery done.     PHYSICAL EXAM:  Adule female in NAD. Accompanied by her .  Respirations even and unlabored  Articulates and communicates with normal affect.  Left shoulder: Incision CDI. CMS intact into the hand. Shoulder ROM is active assisted FE to 90 and ER at side to 15.    IMAGING:   None new    ASSESSMENT:  1. Six weeks status post left shoulder surgery above  2. Right shoulder pain, probable cuff disease and mild degenerative joint disease  3. Chronic pain    PLAN:  -DC sling  -Instructed in home exercises. If does not progress will refer to PT at 3 months  -Continue to wean narcotics  -RTC in 6 weeks      Again, thank you for allowing me to participate in the care of your patient.      Sincerely,    Sue Holcomb MD

## 2017-01-18 NOTE — TELEPHONE ENCOUNTER
Patient requested a refill of Oxycodone.  She is S/P left reverse total shoulder arthroplasty 12/06/2016.  She reports she has been taking 2 Oxycodone daily.  Before surgery she states she was taking Tramadol which was prescribed by her PMD.    Dr Holcomb was consulted.  Patient is 6 weeks post-op.  At this time her primary doctor should be authorizing any future medication requests.  Patient informed and agreeable with this.

## 2017-01-19 NOTE — PROGRESS NOTES
CHIEF CONCERN: Status post left reverse total shoulder arthroplasty  DATE OF SURGERY: 12/6/16    HISTORY OF PRESENT ILLNESS: Mrs. Magana returns now 6 weeks status post the above procedure. She is very pleased with the improvement in her pain. Her primary care physician is asking when she can have her right shoulder surgery done.     PHYSICAL EXAM:  Adule female in NAD. Accompanied by her .  Respirations even and unlabored  Articulates and communicates with normal affect.  Left shoulder: Incision CDI. CMS intact into the hand. Shoulder ROM is active assisted FE to 90 and ER at side to 15.    IMAGING:   None new    ASSESSMENT:  1. Six weeks status post left shoulder surgery above  2. Right shoulder pain, probable cuff disease and mild degenerative joint disease  3. Chronic pain    PLAN:  -DC sling  -Instructed in home exercises. If does not progress will refer to PT at 3 months  -Continue to wean narcotics  -RTC in 6 weeks

## 2017-02-07 DIAGNOSIS — M19.019 SHOULDER ARTHRITIS: Primary | ICD-10-CM

## 2017-02-08 RX ORDER — GABAPENTIN 300 MG/1
CAPSULE ORAL
Qty: 270 CAPSULE | Refills: 0 | Status: SHIPPED | OUTPATIENT
Start: 2017-02-08 | End: 2017-04-13

## 2017-03-01 ENCOUNTER — OFFICE VISIT (OUTPATIENT)
Dept: ORTHOPEDICS | Facility: CLINIC | Age: 67
End: 2017-03-01

## 2017-03-01 VITALS — WEIGHT: 201.9 LBS | BODY MASS INDEX: 33.64 KG/M2 | HEIGHT: 65 IN

## 2017-03-01 DIAGNOSIS — Z96.612 S/P SHOULDER REPLACEMENT, LEFT: Primary | ICD-10-CM

## 2017-03-01 NOTE — LETTER
3/1/2017      RE: Miriam Magana  [address]  [place] [address]       CHIEF CONCERN: Status post left reverse total shoulder arthroplasty  DATE OF SURGERY: 12/6/16    HISTORY OF PRESENT ILLNESS: Mrs. Magana returns now 3 months status post the above procedure. She feels her left shoulder is doing well. She does have some achiness along the deltoid.     PHYSICAL EXAM:  Adule female in NAD. Accompanied by her .  Respirations even and unlabored  Articulates and communicates with normal affect.  Left shoulder: Incision CDI. CMS intact into the hand. Shoulder ROM is active FE to 120 (passive to 145) and ER at side to 15.    IMAGING:   None new    ASSESSMENT:  1. Three months status post left shoulder surgery above  2. Right shoulder pain, probable cuff disease and mild degenerative joint disease  3. Chronic pain    PLAN:  -Discussed expectation for decrease or resolution in deltoid pain through rehab after reverse TSA   -RTC in 3 months        Sue Holcomb MD

## 2017-03-01 NOTE — MR AVS SNAPSHOT
After Visit Summary   3/1/2017    Miriam Magana    MRN: [id number]           Patient Information     Date Of Birth          1950        Visit Information        Provider Department      3/1/2017 9:45 AM Sue Holcomb MD [facility]        Today's Diagnoses     S/P shoulder replacement, left    -  1       Follow-ups after your visit        Your next 10 appointments already scheduled     May 24, 2017  9:00 AM CDT   (Arrive by 8:45 AM)   RETURN SHOULDER with Sue Holcomb MD   [facility] ([facility])    [address] [place] [address]  [facility] [id number] [phone]            Dec 29, 2017 10:45 AM CST   Lab with  LAB   [facility] Lab [facility])    [address] [place] [address]  [facility] [id number] [phone]            Dec 29, 2017 11:30 AM CST   (Arrive by 11:15 AM)   Return Liver Transplant with Vaibhav Ureña MD   [facility] Hepatology ([facility])    [address] [place] [address]  [facility] [id number] [phone]              Who to contact     Please call your clinic at [phone] to:    Ask questions about your health    Make or cancel appointments    Discuss your medicines    Learn about your test results    Speak to your doctor   If you have compliments or concerns about an experience at your clinic, or if you wish to file a complaint, please contact [facility] Patient Relations at [phone] or email us at [email or web address]         Additional Information About Your Visit        MyChart Information     [employer] gives you secure access to your electronic health record. If you see a primary care provider, you can also send messages to your care team and make appointments. If you have questions, please call your primary care clinic.  If you do not have a primary care provider, please call  "[phone] and they will assist you.      [employer] is an electronic gateway that provides easy, online access to your medical records. With [employer], you can request a clinic appointment, read your test results, renew a prescription or communicate with your care team.     To access your existing account, please contact your [facility] or call [phone] for assistance.        Care EveryWhere ID     This is your Care EveryWhere ID. This could be used by other organizations to access your [place] medical records  [id number]        Your Vitals Were     Height BMI (Body Mass Index)                1.638 m (5' 4.5\") 34.12 kg/m2           Blood Pressure from Last 3 Encounters:   12/30/16 144/72   12/08/16 135/64   09/14/16 126/70    Weight from Last 3 Encounters:   03/01/17 91.6 kg (201 lb 14.4 oz)   01/18/17 85.5 kg (188 lb 8 oz)   12/30/16 84.7 kg (186 lb 12.8 oz)              Today, you had the following     No orders found for display         Today's Medication Changes          These changes are accurate as of: 3/1/17 11:59 PM.  If you have any questions, ask your nurse or doctor.               These medicines have changed or have updated prescriptions.        Dose/Directions    insulin glargine 100 UNIT/ML injection   Commonly known as:  LANTUS   This may have changed:    - how much to take  - how to take this  - when to take this  - additional instructions   Used for:  Type II diabetes mellitus (H)        Start with 11 units of Lantus at night when you go home. Check your blood sugars at least twice a day at home and if you have hypoglycemia symptoms; please adjust insulin appropriately   Quantity:  3 mL   Refills:  0       insulin lispro 100 UNIT/ML injection   Commonly known as:  HumaLOG KWIKpen   This may have changed:    - how much to take  - when to take this  - additional instructions   Used for:  Type II diabetes mellitus (H)        Use 2.5 Units/15gm carbs with meals. " Sliding scale as needed:   Do Not give Correction Insulin if Pre-Meal BG < 140.  For Pre-Meal  - 189 give 1 unit.  For Pre-Meal  - 239 give 2 units.  For Pre-Meal  - 289 give 3 units.  For Pre-Meal  - 339 give 4 units.  For Pre-Meal - 399 give 5 units.  For Pre-Meal -449 give 6 units For Pre-Meal BG = or > 450 give 7 units.   Quantity:  1 Month   Refills:  0       LORazepam 1 MG tablet   Commonly known as:  ATIVAN   This may have changed:    - how much to take  - when to take this   Used for:  Depressive disorder        Dose:  0.5 mg   Take 0.5 tablets (0.5 mg) by mouth every 6 hours as needed   Quantity:  30 tablet   Refills:  0       pramipexole 0.125 MG tablet   Commonly known as:  MIRAPEX   This may have changed:  how much to take   Used for:  Insomnia        Dose:  0.125-0.25 mg   Take 1-2 tablets (0.125-0.25 mg) by mouth At Bedtime   Quantity:  90 tablet   Refills:  0       traZODone 50 MG tablet   Commonly known as:  DESYREL   This may have changed:  how much to take   Used for:  Insomnia        Dose:   mg   Take 1-2 tablets ( mg) by mouth nightly as needed for sleep   Quantity:  60 tablet   Refills:  0                Primary Care Provider Office Phone # Fax #    Yvon Lynn [phone]       [address] [address]        Thank you!     Thank you for choosing [facility]  for your care. Our goal is always to provide you with excellent care. Hearing back from our patients is one way we can continue to improve our services. Please take a few minutes to complete the written survey that you may receive in the mail after your visit with us. Thank you!             Your Updated Medication List - Protect others around you: Learn how to safely use, store and throw away your medicines at www.disposemymeds.org.          This list is accurate as of: 3/1/17 11:59 PM.  Always use your most recent med list.                    Brand Name Dispense Instructions for use    acetaminophen 500 MG tablet    TYLENOL     Take 1,000 mg by mouth 2 times daily       ARTIFICIAL TEARS 1.4 % ophthalmic solution   Generic drug:  polyvinyl alcohol      Place 1 drop into both eyes as needed       atorvastatin 40 MG tablet    LIPITOR     TAKE 1 TABLET BY MOUTH ONCE DAILY.       brexpiprazole 0.5 MG tablet    REXULTI     Take 0.5 mg by mouth daily       * buPROPion 300 MG 24 hr tablet    WELLBUTRIN XL     Take 1 tablet by mouth daily       * buPROPion 150 MG 24 hr tablet    WELLBUTRIN XL     Take 150 mg by mouth every morning with 300mg total dose of 450mg       BusPIRone HCl 30 MG Tabs      Take  by mouth 2 times daily.       calcium citrate-vitamin D 315-250 MG-UNIT Tabs per tablet    CITRACAL     Take 2 tablets by mouth 2 times daily.       cetirizine 10 MG tablet    zyrTEC     TAKE 1 TABLET BY MOUTH ONCE DAILY.       ferrous gluconate 324 (38 FE) MG tablet    FERGON    90 tablet    Take 1 tablet (324 mg) by mouth 3 times daily (with meals)       fluticasone 50 MCG/ACT spray    FLONASE     Spray 1 spray into both nostrils 2 times daily       gabapentin 300 MG capsule    NEURONTIN    270 capsule    TAKE 1 CAPSULE BY MOUTH THREE TIMES DAILY. START WITH 1 CAPSULE AT BEDTIME. MAY INCREASE TO 1 CAPSULE THREE TIMES DAILY AS TOLERATED.       insulin glargine 100 UNIT/ML injection    LANTUS    3 mL    Start with 11 units of Lantus at night when you go home. Check your blood sugars at least twice a day at home and if you have hypoglycemia symptoms; please adjust insulin appropriately       insulin lispro 100 UNIT/ML injection    HumaLOG KWIKpen    1 Month    Use 2.5 Units/15gm carbs with meals. Sliding scale as needed:   Do Not give Correction Insulin if Pre-Meal BG < 140.  For Pre-Meal  - 189 give 1 unit.  For Pre-Meal  - 239 give 2 units.  For Pre-Meal  - 289 give 3 units.  For Pre-Meal  - 339 give 4 units.  For Pre-Meal BG  340- 399 give 5 units.  For Pre-Meal -449 give 6 units For Pre-Meal BG = or > 450 give 7 units.       LORazepam 1 MG tablet    ATIVAN    30 tablet    Take 0.5 tablets (0.5 mg) by mouth every 6 hours as needed       metFORMIN 500 MG tablet    GLUCOPHAGE     750 mg at HS       multivitamin, therapeutic with minerals Tabs tablet      Take 1 tablet by mouth daily       mycophenolate tablet     60 tablet    Take 2 tablets (1,000 mg) by mouth 2 times daily       omeprazole 20 MG CR capsule    priLOSEC    90 capsule    Take 1 capsule (20 mg) by mouth daily       oxyCODONE 5 MG IR tablet    ROXICODONE    60 tablet    Take 1-2 tablets (5-10 mg) by mouth every 6 hours as needed for moderate to severe pain       pramipexole 0.125 MG tablet    MIRAPEX    90 tablet    Take 1-2 tablets (0.125-0.25 mg) by mouth At Bedtime       predniSONE 5 MG tablet    DELTASONE    30 tablet    Take 1 tablet (5 mg) by mouth daily       propranolol 40 MG tablet    INDERAL    60 tablet    Take 1 tablet (40 mg) by mouth 2 times daily       senna-docusate 8.6-50 MG per tablet    SENOKOT-S;PERICOLACE    60 tablet    Take 1-2 tablets by mouth 2 times daily as needed for constipation (while taking narcotic pain medications)       sertraline 100 MG tablet    ZOLOFT     Take 200 mg by mouth daily AM       traZODone 50 MG tablet    DESYREL    60 tablet    Take 1-2 tablets ( mg) by mouth nightly as needed for sleep       vitamin D 2000 UNITS tablet      Take 2 tablets by mouth daily AM       * Notice:  This list has 2 medication(s) that are the same as other medications prescribed for you. Read the directions carefully, and ask your doctor or other care provider to review them with you.

## 2017-03-01 NOTE — Clinical Note
3/1/2017       RE: Miriam Magana  [address]  [place] [address]     Dear Colleague,    Thank you for referring your patient, Miriam Magana, to the [facility] at [facility]. Please see a copy of my visit note below.    No notes on file    Again, thank you for allowing me to participate in the care of your patient.      Sincerely,    Sue Holcomb MD

## 2017-03-01 NOTE — NURSING NOTE
"Reason For Visit:   Chief Complaint   Patient presents with     Surgical Followup     s/p DOS 12.6.16       PCP: Yvon Lynn  Ref: Dr Callejas    ?  No  Currently working? No.  Work status?  Retired.  Date of injury: none  Date of surgery: 12.6.16  Type of surgery: Left reverse total shoulder arthoplasty.  Smoker: No  Request smoking cessation information: No    Right hand dominant    SANE score  Affected shoulder: 70  Right shoulder SANE: 40  Left shoulder SANE: 70    Ht 1.638 m (5' 4.5\")  Wt 91.6 kg (201 lb 14.4 oz)  BMI 34.12 kg/m2      Pain Assessment  0-10 Pain Scale: 5  Primary Pain Location: Shoulder  Pain Orientation: Left  Pain Descriptors: Aching  Alleviating Factors: Ice, Pain medication  Aggravating Factors: Exercise        "

## 2017-03-07 DIAGNOSIS — Z94.4 LIVER REPLACED BY TRANSPLANT (H): ICD-10-CM

## 2017-03-07 RX ORDER — URSODIOL 300 MG/1
CAPSULE ORAL
Qty: 270 CAPSULE | Refills: 3 | Status: SHIPPED | OUTPATIENT
Start: 2017-03-07 | End: 2018-01-01

## 2017-03-09 NOTE — PROGRESS NOTES
CHIEF CONCERN: Status post left reverse total shoulder arthroplasty  DATE OF SURGERY: 12/6/16    HISTORY OF PRESENT ILLNESS: Mrs. Magana returns now 3 months status post the above procedure. She feels her left shoulder is doing well. She does have some achiness along the deltoid.     PHYSICAL EXAM:  Adule female in NAD. Accompanied by her .  Respirations even and unlabored  Articulates and communicates with normal affect.  Left shoulder: Incision CDI. CMS intact into the hand. Shoulder ROM is active FE to 120 (passive to 145) and ER at side to 15.    IMAGING:   None new    ASSESSMENT:  1. Three months status post left shoulder surgery above  2. Right shoulder pain, probable cuff disease and mild degenerative joint disease  3. Chronic pain    PLAN:  -Discussed expectation for decrease or resolution in deltoid pain through rehab after reverse TSA   -RTC in 3 months

## 2017-03-21 ENCOUNTER — TELEPHONE (OUTPATIENT)
Dept: TRANSPLANT | Facility: CLINIC | Age: 67
End: 2017-03-21

## 2017-03-21 NOTE — TELEPHONE ENCOUNTER
Miriam called in she had a Paracentesis done on 3/15/17 and site is still leaking. Patient went to ER in her home town and they told her she should schedule to see Dr. Ureña.

## 2017-03-23 ENCOUNTER — TELEPHONE (OUTPATIENT)
Dept: TRANSPLANT | Facility: CLINIC | Age: 67
End: 2017-03-23

## 2017-03-23 NOTE — TELEPHONE ENCOUNTER
Call to Miriam re: leading Paracentesis site.  She is seeing Dr. Ureña tomorrow.  Encouraged her to use pads or towels to absorb the fluid.

## 2017-03-23 NOTE — TELEPHONE ENCOUNTER
----- Message from Natalia Wren, RN sent at 3/23/2017  2:56 PM CDT -----  Regarding: about that para  Lauren Castillo 2 days ago              Miriam called in she had a Paracentesis done on 3/15/17 and site is still leaking. Patient went to ER in her home town and they told her she should schedule to see Dr. Ureña.

## 2017-03-24 ENCOUNTER — OFFICE VISIT (OUTPATIENT)
Dept: GASTROENTEROLOGY | Facility: CLINIC | Age: 67
End: 2017-03-24
Attending: INTERNAL MEDICINE
Payer: MEDICARE

## 2017-03-24 VITALS
SYSTOLIC BLOOD PRESSURE: 146 MMHG | DIASTOLIC BLOOD PRESSURE: 79 MMHG | TEMPERATURE: 98.8 F | HEART RATE: 73 BPM | BODY MASS INDEX: 33.89 KG/M2 | HEIGHT: 65 IN | WEIGHT: 203.4 LBS | OXYGEN SATURATION: 96 %

## 2017-03-24 DIAGNOSIS — Z94.4 LIVER REPLACED BY TRANSPLANT (H): ICD-10-CM

## 2017-03-24 DIAGNOSIS — R18.8 OTHER ASCITES: ICD-10-CM

## 2017-03-24 DIAGNOSIS — K80.31 CHOLANGITIS DUE TO BILE DUCT CALCULUS WITH OBSTRUCTION: Primary | ICD-10-CM

## 2017-03-24 LAB
ALBUMIN SERPL-MCNC: 3.5 G/DL (ref 3.4–5)
ALP SERPL-CCNC: 170 U/L (ref 40–150)
ALT SERPL W P-5'-P-CCNC: 40 U/L (ref 0–50)
ANION GAP SERPL CALCULATED.3IONS-SCNC: 10 MMOL/L (ref 3–14)
AST SERPL W P-5'-P-CCNC: 44 U/L (ref 0–45)
BILIRUB DIRECT SERPL-MCNC: 0.4 MG/DL (ref 0–0.2)
BILIRUB SERPL-MCNC: 0.8 MG/DL (ref 0.2–1.3)
BUN SERPL-MCNC: 17 MG/DL (ref 7–30)
CALCIUM SERPL-MCNC: 8.5 MG/DL (ref 8.5–10.1)
CHLORIDE SERPL-SCNC: 104 MMOL/L (ref 94–109)
CO2 SERPL-SCNC: 24 MMOL/L (ref 20–32)
CREAT SERPL-MCNC: 0.9 MG/DL (ref 0.52–1.04)
ERYTHROCYTE [DISTWIDTH] IN BLOOD BY AUTOMATED COUNT: 16.4 % (ref 10–15)
GFR SERPL CREATININE-BSD FRML MDRD: 62 ML/MIN/1.7M2
GLUCOSE SERPL-MCNC: 91 MG/DL (ref 70–99)
HCT VFR BLD AUTO: 39.7 % (ref 35–47)
HGB BLD-MCNC: 12.2 G/DL (ref 11.7–15.7)
MCH RBC QN AUTO: 25.3 PG (ref 26.5–33)
MCHC RBC AUTO-ENTMCNC: 30.7 G/DL (ref 31.5–36.5)
MCV RBC AUTO: 82 FL (ref 78–100)
PLATELET # BLD AUTO: 96 10E9/L (ref 150–450)
POTASSIUM SERPL-SCNC: 4.4 MMOL/L (ref 3.4–5.3)
PROT SERPL-MCNC: 6.7 G/DL (ref 6.8–8.8)
RBC # BLD AUTO: 4.83 10E12/L (ref 3.8–5.2)
SODIUM SERPL-SCNC: 138 MMOL/L (ref 133–144)
WBC # BLD AUTO: 6.1 10E9/L (ref 4–11)

## 2017-03-24 PROCEDURE — 36415 COLL VENOUS BLD VENIPUNCTURE: CPT | Performed by: INTERNAL MEDICINE

## 2017-03-24 PROCEDURE — 99212 OFFICE O/P EST SF 10 MIN: CPT | Mod: ZF

## 2017-03-24 PROCEDURE — 80076 HEPATIC FUNCTION PANEL: CPT | Performed by: INTERNAL MEDICINE

## 2017-03-24 PROCEDURE — 85027 COMPLETE CBC AUTOMATED: CPT | Performed by: INTERNAL MEDICINE

## 2017-03-24 PROCEDURE — 80048 BASIC METABOLIC PNL TOTAL CA: CPT | Performed by: INTERNAL MEDICINE

## 2017-03-24 RX ORDER — FUROSEMIDE 40 MG
40 TABLET ORAL DAILY
Qty: 90 TABLET | Refills: 3 | Status: SHIPPED | OUTPATIENT
Start: 2017-03-24

## 2017-03-24 ASSESSMENT — PAIN SCALES - GENERAL: PAINLEVEL: NO PAIN (0)

## 2017-03-24 NOTE — MR AVS SNAPSHOT
After Visit Summary   3/24/2017    Miriam Magana    MRN: [id number]           Patient Information     Date Of Birth          1950        Visit Information        Provider Department      3/24/2017 9:30 AM Vaibhav Ureña MD [facility]        Today's Diagnoses     Cholangitis due to bile duct calculus with obstruction    -  1    Other ascites           Follow-ups after your visit        Follow-up notes from your care team     Return in about 6 months (around 9/24/2017).      Your next 10 appointments already scheduled     Apr 26, 2017  8:00 AM CDT   Upper Endoscopy with Sammy Perez MD   [facility] ([facility])    [address] [place] [address]  [address]  [facility] [address]   [phone]            May 24, 2017  9:00 AM CDT   (Arrive by 8:45 AM)   RETURN SHOULDER with Sue Holcomb MD   [facility] ([facility])    [address] [place] [address]  4th [facility] [address]   [phone]            Sep 20, 2017 11:15 AM CDT   Lab with  LAB    [facility] ([facility])    [address] [place] [address]   [phone]            Sep 20, 2017 12:15 PM CDT   (Arrive by 12:00 PM)   Return Liver Transplant with Vaibhav Ureña MD   [facility] ([facility])    [address] [place] [address]  3rd [facility] [address]   [phone]            Dec 29, 2017 10:45 AM CST   Lab with  LAB    [facility] ([facility])    [address] [place] [address]  1st [facility] [address]   [phone]            Dec 29, 2017 11:30 AM CST   (Arrive by 11:15 AM)   Return Liver Transplant with Vaibhav Ureña MD   [facility] ([facility])    [address] [place] [address]   [phone]              Who to contact     If you have questions or need follow up  "information about today's clinic visit or your schedule please contact [facility] directly at [phone].  Normal or non-critical lab and imaging results will be communicated to you by [employer], letter or phone within 4 business days after the clinic has received the results. If you do not hear from us within 7 days, please contact the clinic through [employer] or phone. If you have a critical or abnormal lab result, we will notify you by phone as soon as possible.  Submit refill requests through [employer] or call your pharmacy and they will forward the refill request to us. Please allow 3 business days for your refill to be completed.          Additional Information About Your Visit        [employer] Information     [employer] gives you secure access to your electronic health record. If you see a primary care provider, you can also send messages to your care team and make appointments. If you have questions, please call your primary care clinic.  If you do not have a primary care provider, please call [phone] and they will assist you.        Care EveryWhere ID     This is your Care EveryWhere ID. This could be used by other organizations to access your [place] medical records  [id number]        Your Vitals Were     Pulse Temperature Height Pulse Oximetry BMI (Body Mass Index)       73 98.8  F (37.1  C) (Oral) 1.638 m (5' 4.5\") 96% 34.37 kg/m2        Blood Pressure from Last 3 Encounters:   03/24/17 146/79   12/30/16 144/72   12/08/16 135/64    Weight from Last 3 Encounters:   03/24/17 92.3 kg (203 lb 6.4 oz)   03/01/17 91.6 kg (201 lb 14.4 oz)   01/18/17 85.5 kg (188 lb 8 oz)              We Performed the Following     H ERCP BALLOON DILATE BILIARY/PANC DUCT/AMPULLA EA DUCT          Today's Medication Changes          These changes are accurate as of: 3/24/17 10:13 AM.  If you have any questions, ask your nurse or doctor.               Start taking these medicines.        Dose/Directions    furosemide 40 MG " tablet   Commonly known as:  LASIX   Used for:  Other ascites   Started by:  Vaibhav Ureña MD        Dose:  40 mg   Take 1 tablet (40 mg) by mouth daily   Quantity:  90 tablet   Refills:  3         These medicines have changed or have updated prescriptions.        Dose/Directions    insulin glargine 100 UNIT/ML injection   Commonly known as:  LANTUS   This may have changed:    - how much to take  - how to take this  - when to take this  - additional instructions   Used for:  Type II diabetes mellitus (H)        Start with 11 units of Lantus at night when you go home. Check your blood sugars at least twice a day at home and if you have hypoglycemia symptoms; please adjust insulin appropriately   Quantity:  3 mL   Refills:  0       insulin lispro 100 UNIT/ML injection   Commonly known as:  HumaLOG KWIKpen   This may have changed:    - how much to take  - when to take this  - additional instructions   Used for:  Type II diabetes mellitus (H)        Use 2.5 Units/15gm carbs with meals. Sliding scale as needed:   Do Not give Correction Insulin if Pre-Meal BG < 140.  For Pre-Meal  - 189 give 1 unit.  For Pre-Meal  - 239 give 2 units.  For Pre-Meal  - 289 give 3 units.  For Pre-Meal  - 339 give 4 units.  For Pre-Meal - 399 give 5 units.  For Pre-Meal -449 give 6 units For Pre-Meal BG = or > 450 give 7 units.   Quantity:  1 Month   Refills:  0       LORazepam 1 MG tablet   Commonly known as:  ATIVAN   This may have changed:    - how much to take  - when to take this   Used for:  Depressive disorder        Dose:  0.5 mg   Take 0.5 tablets (0.5 mg) by mouth every 6 hours as needed   Quantity:  30 tablet   Refills:  0       pramipexole 0.125 MG tablet   Commonly known as:  MIRAPEX   This may have changed:  how much to take   Used for:  Insomnia        Dose:  0.125-0.25 mg   Take 1-2 tablets (0.125-0.25 mg) by mouth At Bedtime   Quantity:  90 tablet   Refills:  0       traZODone 50 MG tablet    Commonly known as:  DESYREL   This may have changed:  how much to take   Used for:  Insomnia        Dose:   mg   Take 1-2 tablets ( mg) by mouth nightly as needed for sleep   Quantity:  60 tablet   Refills:  0            Where to get your medicines      These medications were sent to [facility] - [place], [place] - [facility] [address]  [address], [facility] [address]     Phone:  [phone]     furosemide 40 MG tablet                Primary Care Provider Office Phone # Fax #    Yvon Lynn [phone]       [facility] [address] [facility] [address]        Thank you!     Thank you for choosing [facility]  for your care. Our goal is always to provide you with excellent care. Hearing back from our patients is one way we can continue to improve our services. Please take a few minutes to complete the written survey that you may receive in the mail after your visit with us. Thank you!             Your Updated Medication List - Protect others around you: Learn how to safely use, store and throw away your medicines at www.disposemymeds.org.          This list is accurate as of: 3/24/17 10:13 AM.  Always use your most recent med list.                   Brand Name Dispense Instructions for use    acetaminophen 500 MG tablet    TYLENOL     Take 1,000 mg by mouth 2 times daily       ARTIFICIAL TEARS 1.4 % ophthalmic solution   Generic drug:  polyvinyl alcohol      Place 1 drop into both eyes as needed       atorvastatin 40 MG tablet    LIPITOR     TAKE 1 TABLET BY MOUTH ONCE DAILY.       brexpiprazole 0.5 MG tablet    REXULTI     Take 0.5 mg by mouth daily       * buPROPion 300 MG 24 hr tablet    WELLBUTRIN XL     Take 1 tablet by mouth daily       * buPROPion 150 MG 24 hr tablet    WELLBUTRIN XL     Take 150 mg by mouth every morning with 300mg total dose of 450mg       BusPIRone HCl 30 MG Tabs      Take  by mouth 2 times daily.       calcium  citrate-vitamin D 315-250 MG-UNIT Tabs per tablet    CITRACAL     Take 2 tablets by mouth 2 times daily.       cetirizine 10 MG tablet    zyrTEC     TAKE 1 TABLET BY MOUTH ONCE DAILY.       ferrous gluconate 324 (38 FE) MG tablet    FERGON    90 tablet    Take 1 tablet (324 mg) by mouth 3 times daily (with meals)       fluticasone 50 MCG/ACT spray    FLONASE     Spray 1 spray into both nostrils 2 times daily       furosemide 40 MG tablet    LASIX    90 tablet    Take 1 tablet (40 mg) by mouth daily       gabapentin 300 MG capsule    NEURONTIN    270 capsule    TAKE 1 CAPSULE BY MOUTH THREE TIMES DAILY. START WITH 1 CAPSULE AT BEDTIME. MAY INCREASE TO 1 CAPSULE THREE TIMES DAILY AS TOLERATED.       insulin glargine 100 UNIT/ML injection    LANTUS    3 mL    Start with 11 units of Lantus at night when you go home. Check your blood sugars at least twice a day at home and if you have hypoglycemia symptoms; please adjust insulin appropriately       insulin lispro 100 UNIT/ML injection    HumaLOG KWIKpen    1 Month    Use 2.5 Units/15gm carbs with meals. Sliding scale as needed:   Do Not give Correction Insulin if Pre-Meal BG < 140.  For Pre-Meal  - 189 give 1 unit.  For Pre-Meal  - 239 give 2 units.  For Pre-Meal  - 289 give 3 units.  For Pre-Meal  - 339 give 4 units.  For Pre-Meal - 399 give 5 units.  For Pre-Meal -449 give 6 units For Pre-Meal BG = or > 450 give 7 units.       LORazepam 1 MG tablet    ATIVAN    30 tablet    Take 0.5 tablets (0.5 mg) by mouth every 6 hours as needed       metFORMIN 500 MG tablet    GLUCOPHAGE     750 mg at HS       multivitamin, therapeutic with minerals Tabs tablet      Take 1 tablet by mouth daily       mycophenolate tablet     60 tablet    Take 2 tablets (1,000 mg) by mouth 2 times daily       omeprazole 20 MG CR capsule    priLOSEC    90 capsule    Take 1 capsule (20 mg) by mouth daily       pramipexole 0.125 MG tablet    MIRAPEX    90 tablet     Take 1-2 tablets (0.125-0.25 mg) by mouth At Bedtime       predniSONE 5 MG tablet    DELTASONE    30 tablet    Take 1 tablet (5 mg) by mouth daily       propranolol 40 MG tablet    INDERAL    60 tablet    Take 1 tablet (40 mg) by mouth 2 times daily       senna-docusate 8.6-50 MG per tablet    SENOKOT-S;PERICOLACE    60 tablet    Take 1-2 tablets by mouth 2 times daily as needed for constipation (while taking narcotic pain medications)       sertraline 100 MG tablet    ZOLOFT     Take 200 mg by mouth daily AM       traZODone 50 MG tablet    DESYREL    60 tablet    Take 1-2 tablets ( mg) by mouth nightly as needed for sleep       ursodiol 300 MG capsule    ACTIGALL    270 capsule    Take 2 caps in a.m. And 1 cap in p.m.       vitamin D 2000 UNITS tablet      Take 2 tablets by mouth daily AM       * Notice:  This list has 2 medication(s) that are the same as other medications prescribed for you. Read the directions carefully, and ask your doctor or other care provider to review them with you.

## 2017-03-24 NOTE — PROGRESS NOTES
"Hepatology Clinic Note          Miriam Magana MRN: [id number]  1950  ___________________________________  Reason for visit: F/u 22 years post liver transplantation   HPI:   Patient reports feeling: \"Lorenzo\"     Last seen 12/30/16 by Dr. Ureña. Updates since last seen:   -Presented to [place] ED on 3/15/17 with SOB and ascites, referred to [place] on 3/16/17 for paracentesis. This is her first paracentesis since liver transplant), was straw yellow clear colored, negative for infection. She feels like she was leaking from puncture site up until 2 days ago. Had 2.6 L removed, received albumin afterwards.  - retired from car sales, enjoying time at home together.   -Ongoing followup with Orthopedics since L. Shoulder replacement on 12/6/16, doing well, with some residual achiness along the deltoid that will likely improve with rehab. Right shoulder pain felt to be probable cuff disease and mild degenerative joint disease.   -Last Hemoglobin A1c of 6.3 in 1/10/17    ROS:  No abdominal pain, itching, skin rash. Feels \"tired all the time,\" despite CPAP use (feels like CPAP may be leaking). Has noticed increased abdominal girth and lower extremity swelling. Measures temperature at home and gets low-grade fevers, 99 or 100.3F sometimes chills. Fevers usually come at night. Has noticed sob even when sitting, which has improved since paracentesis, dyspnea on exertion. No chest pain. No diarrhea, some constipation, mostly has 1 BM daily. Good appetite \"too good.\"  No n/v.   Weight trend in EPIC: 187 lbs in 12/2016, now 203 lbs.     PMH:  Past Medical History:   Diagnosis Date     Cirrhosis, biliary (H)     s/p Liver transplant in 1995     CKD (chronic kidney disease)      Depression      Diabetes type 2, controlled (H)      Esophagitis      Fibromyalgia      History of blood transfusion      Hypertension      Memory impairment      MALIK (obstructive sleep apnea)     CPAP     Other chronic pain     bilateral " shoulder     Secondary adrenal insufficiency (H)      Urinary incontinence        FHx:  Family History   Problem Relation Age of Onset     Dementia Father      Dementia Mother      Heart Failure Mother      Myocardial Infarction Mother      Hypertension Mother      DIABETES Brother      CANCER Sister        Allergies:  Allergies   Allergen Reactions     Blood Transfusion Related (Informational Only) Other (See Comments)     Patient has a history of a clinically significant antibody against RBC antigens.  A delay in compatible RBCs may occur.  Patient has Big Anti E, anti Pulido-a and unidentified antibody.       Aspirin Other (See Comments)     due to liver transplant and high bleed risk     Lunesta 2mg [Eszopiclone] Other (See Comments)     Suicidal   vivid dreams     Morphine Other (See Comments)     Bad dreams     Pravastatin Unknown and Other (See Comments)     Escobar's        Medications:  Current Outpatient Prescriptions   Medication     ursodiol (ACTIGALL) 300 MG capsule     gabapentin (NEURONTIN) 300 MG capsule     omeprazole (PRILOSEC) 20 MG CR capsule     oxyCODONE (ROXICODONE) 5 MG IR tablet     senna-docusate (SENOKOT-S;PERICOLACE) 8.6-50 MG per tablet     brexpiprazole (REXULTI) 0.5 MG tablet     fluticasone (FLONASE) 50 MCG/ACT spray     ferrous gluconate (FERGON) 324 (38 FE) MG tablet     CELLCEPT 500 MG PO TABLET     predniSONE (DELTASONE) 5 MG tablet     multivitamin, therapeutic with minerals (THERA-VIT-M) TABS     acetaminophen (TYLENOL) 500 MG tablet     atorvastatin (LIPITOR) 40 MG tablet     cetirizine (ZYRTEC) 10 MG tablet     metFORMIN (GLUCOPHAGE) 500 MG tablet     buPROPion (WELLBUTRIN XL) 150 MG 24 hr tablet     sertraline (ZOLOFT) 100 MG tablet     LORazepam (ATIVAN) 1 MG tablet     traZODone (DESYREL) 50 MG tablet     insulin glargine (LANTUS) 100 UNIT/ML PEN     pramipexole (MIRAPEX) 0.125 MG tablet     propranolol (INDERAL) 40 MG tablet     insulin lispro (HUMALOG KWIKPEN) 100 UNIT/ML soln  "    buPROPion (WELLBUTRIN XL) 300 MG 24 hr tablet     polyvinyl alcohol (ARTIFICIAL TEARS) 1.4 % ophthalmic solution     BusPIRone HCl 30 MG TABS     calcium citrate-vitamin D (CITRACAL) 315-250 MG-UNIT TABS     Cholecalciferol (VITAMIN D) 2000 UNIT tablet     No current facility-administered medications for this visit.      She is on mycophenolate 1000 mg p.o. b.i.d. and prednisone 5 mg once a day for her immunosuppressive regimen. She is also on ursodiol/Actigall 600 mg in the morning, 300 mg at night.      Physical Exam:  Vitals: /79  Pulse 73  Temp 98.8  F (37.1  C) (Oral)  Ht 1.638 m (5' 4.5\")  Wt 92.3 kg (203 lb 6.4 oz)  SpO2 96%  BMI 34.37 kg/m2      Wt Readings from Last 4 Encounters:   03/01/17 91.6 kg (201 lb 14.4 oz)   01/18/17 85.5 kg (188 lb 8 oz)   12/30/16 84.7 kg (186 lb 12.8 oz)   12/06/16 85.2 kg (187 lb 13.3 oz)       General: Pt sitting quietly in chair. NAD.   HEENT: No temporal muscle wasting, or scleral icterus. NCAT; PERRL; EOMI; no rhinorrhea or congestion; NL voice; throat without exudate and non-erythematous.   Neck/Thyroid: No masses/abnormalities/LAD present.   Chest: Clear  Abdominal: Distended with +fluid wave, soft, nontender, bowel sounds present. No palpable HSM. No masses or tenderness.   Skin: No rashes or cyanosis on limited examination; otherwise appropriate for age and ethnicity.  Extremities: 2+ edema in bilateral LE; moving all extremities; strength 5/5 throughout.   Neuro: No asterixis     Diagnostic Studies:  3/24/17  WBC 6.1, Hgb 12.2, Plts 96   BMP: Na 138, K 4.4, Cl 104, CO2 24, BUN 17, Cr 0.9  Hepatic Panel: D Bili 0.4, T Bili 0.8, Alb 3.5, TP 6.7, Alkphos 170, ALT 40, AST 44      1/16/17: Edinson Wilderji   RIGHT UPPER QUADRANT ULTRASOUND  IMPRESSION:  -Heterogeneous liver parenchyma compatible with cirrhosis. Splenomegaly also noted compatible with portal hypertension.  -Apparent mass within the posterior medial right lower lobe. This is incompletely " characterized on this examination. Consider multiphase contrast-enhanced CT for further evaluation.  -Intrahepatic biliary ductal dilatation left lobe. Stent suggested within the common duct.      1/26/2017: Edinson Owens   CT ABDOMEN AND PELVIS WITH AND WITHOUT CONTRAST     IMPRESSION:       -Cirrhosis, portal hypertension, and splenomegaly.  No gross liver lesion demonstrated.    -Biliary stent with associated intrahepatic biliary ductal dilatation and high attenuating material within the proximal bile ducts could indicate a nonfunctioning or hypofunctioning stent.  Correlation with laboratory values is suggested.     Assessment & Plan:    #Decompensated Cirrhosis: In part related to chronic biliary tract disease and c/b ascites, varices   MELD-Na Score of 9 today in clinic   --Pt will need EGD for further monitoring of known varices (Last EGD was on 1/19/15 with Grade 2 EV, completely eradicated and banded.)  --Next visit will also discuss starting Nadolol   --Pt is up to date with cancer screening for HCC (last RUQ US and CT Abd/Pelvis in 1/2017); next due 7/2017.     #Concern for ascending cholangitis: In the setting of bare metal stent placed in pt's biliary tract years ago. Last ERCP in September with plan to follow liver tests and if alkphos rising, will get re-ERCP.   Today, given pt's low grade fevers and persistently elevated alkphos, will schedule with Dr. West for repeat cleaning out of bare metal stent.   --ERCP with EGD      Follow up in 6 months.     Pt seen and discussed with Dr. Ureña.     Teresa Merida MD   Internal Medicine PGY-2    Seen and examined.  The above assessment and plan was developed jointly with the resident.    Vaibhav Ureña MD      Professor of Medicine  [employer]      Executive Medical Director, Solid Organ Transplant Program  [facility]

## 2017-03-24 NOTE — LETTER
"3/24/2017    RE: Miriam Magana  [id number]  [facility] [address]       Hepatology Clinic Note          Miriam Magana MRN: [id number]  1950  ___________________________________  Reason for visit: F/u 22 years post liver transplantation   HPI:   Patient reports feeling: \"Lorenzo\"     Last seen 12/30/16 by Dr. Ureña. Updates since last seen:   -Presented to [place] ED on 3/15/17 with SOB and ascites, referred to [facility] on 3/16/17 for paracentesis. This is her first paracentesis since liver transplant), was straw yellow clear colored, negative for infection. She feels like she was leaking from puncture site up until 2 days ago. Had 2.6 L removed, received albumin afterwards.  - retired from car sales, enjoying time at home together.   -Ongoing followup with Orthopedics since L. Shoulder replacement on 12/6/16, doing well, with some residual achiness along the deltoid that will likely improve with rehab. Right shoulder pain felt to be probable cuff disease and mild degenerative joint disease.   -Last Hemoglobin A1c of 6.3 in 1/10/17    ROS:  No abdominal pain, itching, skin rash. Feels \"tired all the time,\" despite CPAP use (feels like CPAP may be leaking). Has noticed increased abdominal girth and lower extremity swelling. Measures temperature at home and gets low-grade fevers, 99 or 100.3F sometimes chills. Fevers usually come at night. Has noticed sob even when sitting, which has improved since paracentesis, dyspnea on exertion. No chest pain. No diarrhea, some constipation, mostly has 1 BM daily. Good appetite \"too good.\"  No n/v.   Weight trend in EPIC: 187 lbs in 12/2016, now 203 lbs.     PMH:  Past Medical History:   Diagnosis Date     Cirrhosis, biliary (H)     s/p Liver transplant in 1995     CKD (chronic kidney disease)      Depression      Diabetes type 2, controlled (H)      Esophagitis      Fibromyalgia      History of blood transfusion      Hypertension      Memory impairment      MALIK " (obstructive sleep apnea)     CPAP     Other chronic pain     bilateral shoulder     Secondary adrenal insufficiency (H)      Urinary incontinence        FHx:  Family History   Problem Relation Age of Onset     Dementia Father      Dementia Mother      Heart Failure Mother      Myocardial Infarction Mother      Hypertension Mother      DIABETES Brother      CANCER Sister        Allergies:  Allergies   Allergen Reactions     Blood Transfusion Related (Informational Only) Other (See Comments)     Patient has a history of a clinically significant antibody against RBC antigens.  A delay in compatible RBCs may occur.  Patient has Big Anti E, anti Pulido-a and unidentified antibody.       Aspirin Other (See Comments)     due to liver transplant and high bleed risk     Lunesta 2mg [Eszopiclone] Other (See Comments)     Suicidal   vivid dreams     Morphine Other (See Comments)     Bad dreams     Pravastatin Unknown and Other (See Comments)     Escobar's        Medications:  Current Outpatient Prescriptions   Medication     ursodiol (ACTIGALL) 300 MG capsule     gabapentin (NEURONTIN) 300 MG capsule     omeprazole (PRILOSEC) 20 MG CR capsule     oxyCODONE (ROXICODONE) 5 MG IR tablet     senna-docusate (SENOKOT-S;PERICOLACE) 8.6-50 MG per tablet     brexpiprazole (REXULTI) 0.5 MG tablet     fluticasone (FLONASE) 50 MCG/ACT spray     ferrous gluconate (FERGON) 324 (38 FE) MG tablet     CELLCEPT 500 MG PO TABLET     predniSONE (DELTASONE) 5 MG tablet     multivitamin, therapeutic with minerals (THERA-VIT-M) TABS     acetaminophen (TYLENOL) 500 MG tablet     atorvastatin (LIPITOR) 40 MG tablet     cetirizine (ZYRTEC) 10 MG tablet     metFORMIN (GLUCOPHAGE) 500 MG tablet     buPROPion (WELLBUTRIN XL) 150 MG 24 hr tablet     sertraline (ZOLOFT) 100 MG tablet     LORazepam (ATIVAN) 1 MG tablet     traZODone (DESYREL) 50 MG tablet     insulin glargine (LANTUS) 100 UNIT/ML PEN     pramipexole (MIRAPEX) 0.125 MG tablet     propranolol  "(INDERAL) 40 MG tablet     insulin lispro (HUMALOG KWIKPEN) 100 UNIT/ML soln     buPROPion (WELLBUTRIN XL) 300 MG 24 hr tablet     polyvinyl alcohol (ARTIFICIAL TEARS) 1.4 % ophthalmic solution     BusPIRone HCl 30 MG TABS     calcium citrate-vitamin D (CITRACAL) 315-250 MG-UNIT TABS     Cholecalciferol (VITAMIN D) 2000 UNIT tablet     No current facility-administered medications for this visit.      She is on mycophenolate 1000 mg p.o. b.i.d. and prednisone 5 mg once a day for her immunosuppressive regimen. She is also on ursodiol/Actigall 600 mg in the morning, 300 mg at night.      Physical Exam:  Vitals: /79  Pulse 73  Temp 98.8  F (37.1  C) (Oral)  Ht 1.638 m (5' 4.5\")  Wt 92.3 kg (203 lb 6.4 oz)  SpO2 96%  BMI 34.37 kg/m2      Wt Readings from Last 4 Encounters:   03/01/17 91.6 kg (201 lb 14.4 oz)   01/18/17 85.5 kg (188 lb 8 oz)   12/30/16 84.7 kg (186 lb 12.8 oz)   12/06/16 85.2 kg (187 lb 13.3 oz)       General: Pt sitting quietly in chair. NAD.   HEENT: No temporal muscle wasting, or scleral icterus. NCAT; PERRL; EOMI; no rhinorrhea or congestion; NL voice; throat without exudate and non-erythematous.   Neck/Thyroid: No masses/abnormalities/LAD present.   Chest: Clear  Abdominal: Distended with +fluid wave, soft, nontender, bowel sounds present. No palpable HSM. No masses or tenderness.   Skin: No rashes or cyanosis on limited examination; otherwise appropriate for age and ethnicity.  Extremities: 2+ edema in bilateral LE; moving all extremities; strength 5/5 throughout.   Neuro: No asterixis     Diagnostic Studies:  3/24/17  WBC 6.1, Hgb 12.2, Plts 96   BMP: Na 138, K 4.4, Cl 104, CO2 24, BUN 17, Cr 0.9  Hepatic Panel: D Bili 0.4, T Bili 0.8, Alb 3.5, TP 6.7, Alkphos 170, ALT 40, AST 44      1/16/17: [facility]   RIGHT UPPER QUADRANT ULTRASOUND  IMPRESSION:  -Heterogeneous liver parenchyma compatible with cirrhosis. Splenomegaly also noted compatible with portal hypertension.  -Apparent " mass within the posterior medial right lower lobe. This is incompletely characterized on this examination. Consider multiphase contrast-enhanced CT for further evaluation.  -Intrahepatic biliary ductal dilatation left lobe. Stent suggested within the common duct.      1/26/2017: [facility]   CT ABDOMEN AND PELVIS WITH AND WITHOUT CONTRAST     IMPRESSION:       -Cirrhosis, portal hypertension, and splenomegaly.  No gross liver lesion demonstrated.    -Biliary stent with associated intrahepatic biliary ductal dilatation and high attenuating material within the proximal bile ducts could indicate a nonfunctioning or hypofunctioning stent.  Correlation with laboratory values is suggested.     Assessment & Plan:    #Decompensated Cirrhosis: In part related to chronic biliary tract disease and c/b ascites, varices   MELD-Na Score of 9 today in clinic   --Pt will need EGD for further monitoring of known varices (Last EGD was on 1/19/15 with Grade 2 EV, completely eradicated and banded.)  --Next visit will also discuss starting Nadolol   --Pt is up to date with cancer screening for HCC (last RUQ US and CT Abd/Pelvis in 1/2017); next due 7/2017.     #Concern for ascending cholangitis: In the setting of bare metal stent placed in pt's biliary tract years ago. Last ERCP in September with plan to follow liver tests and if alkphos rising, will get re-ERCP.   Today, given pt's low grade fevers and persistently elevated alkphos, will schedule with Dr. West for repeat cleaning out of bare metal stent.   --ERCP with EGD      Follow up in 6 months.     Pt seen and discussed with Dr. Ureña.     Teresa Merida MD   Internal Medicine PGY-2    Seen and examined.  The above assessment and plan was developed jointly with the resident.    Vaibhav Ureña MD      Professor of Medicine  [employer]      Executive Medical Director, Solid Organ Transplant Program  [facility]

## 2017-03-24 NOTE — NURSING NOTE
Chief Complaint   Patient presents with     RECHECK     Post Liver TXP   Pt roomed, vitals, meds, and allergies reviewed with pt. Pt ready for provider.  Justin Kauffman, CMA

## 2017-03-30 DIAGNOSIS — Z94.4 LIVER REPLACED BY TRANSPLANT (H): ICD-10-CM

## 2017-03-30 RX ORDER — FERROUS GLUCONATE 324(38)MG
1 TABLET ORAL
Qty: 90 TABLET | Refills: 1 | Status: SHIPPED | OUTPATIENT
Start: 2017-03-30 | End: 2017-05-12

## 2017-04-03 ENCOUNTER — TELEPHONE (OUTPATIENT)
Dept: GASTROENTEROLOGY | Facility: CLINIC | Age: 67
End: 2017-04-03

## 2017-04-04 ENCOUNTER — TELEPHONE (OUTPATIENT)
Dept: GASTROENTEROLOGY | Facility: CLINIC | Age: 67
End: 2017-04-04

## 2017-04-04 NOTE — TELEPHONE ENCOUNTER
"Spoke to Miriam's  who informed me that he is the, \"message taker,\" as Miriam rarely answers her phone.  I asked if I could discuss scheduling with him and he said yes.   I offered to scheduled Miriam on 05/03/2017 at 8:15 AM with an arrival time of 6:15 AM.  He is going to consult with patient and get back to me.   Number given.    SR 04/04/2017  120p  "

## 2017-04-05 ENCOUNTER — TELEPHONE (OUTPATIENT)
Dept: GASTROENTEROLOGY | Facility: CLINIC | Age: 67
End: 2017-04-05

## 2017-04-05 NOTE — TELEPHONE ENCOUNTER
Guanako is calling to inform me that 05/03/2017 works for Miriam.  He knows she is scheduled on 5/3/17 at 8:15 AM with an arrival time of 6:15 AM.  He knows she will need a pre-op physical form prior to her appointment, a  and someone to monitor her for 24 hours post procedure.  All instructions will be mailed along with a pre-op physical form.  Guanako krystal Pineda's address was verified to be correct during this call.    SR 04/05/2017  212p

## 2017-04-13 DIAGNOSIS — M19.019 SHOULDER ARTHRITIS: ICD-10-CM

## 2017-04-13 RX ORDER — GABAPENTIN 300 MG/1
CAPSULE ORAL
Qty: 90 CAPSULE | Refills: 0 | Status: SHIPPED | OUTPATIENT
Start: 2017-04-13 | End: 2017-06-08

## 2017-04-21 ENCOUNTER — TELEPHONE (OUTPATIENT)
Dept: GASTROENTEROLOGY | Facility: OUTPATIENT CENTER | Age: 67
End: 2017-04-21

## 2017-04-21 NOTE — TELEPHONE ENCOUNTER
Patient taking any blood thinners ? no    Heart disease ? denies    Lung disease ? denies      Sleep apnea ? Cpap    Diabetic ? Type 2    Kidney disease ? chronic    Dialysis ? n/a    Electronic implanted medical devices ? denies    Are you taking any narcotic pain medication ? no  What is your daily dosage ?    PTSD ? n/a    Prep instructions reviewed with patient ? Instructions,  policy, MAC sedation plan reviewed. Advised pt. Have someone to stay with he post exam    Pharmacy : n/a  Diagnoses      Esophageal varices without bleeding, unspecified esophageal varices type (H) [I85.00]  - Primary               Indication for procedure :    Referring provider :  [id number]]      Electronically signed by: Vaibhav Ureña MD on 06/24/16 0901 Status: Active     Ordering user: Vaibhav Ureña MD 06/24/16 0901

## 2017-04-26 ENCOUNTER — TRANSFERRED RECORDS (OUTPATIENT)
Dept: HEALTH INFORMATION MANAGEMENT | Facility: CLINIC | Age: 67
End: 2017-04-26

## 2017-05-01 RX ORDER — LIDOCAINE 40 MG/G
CREAM TOPICAL
Status: CANCELLED | OUTPATIENT
Start: 2017-05-01

## 2017-05-03 ENCOUNTER — HOSPITAL ENCOUNTER (OUTPATIENT)
Facility: CLINIC | Age: 67
Discharge: HOME | End: 2017-05-03
Attending: INTERNAL MEDICINE | Admitting: INTERNAL MEDICINE
Payer: MEDICARE

## 2017-05-03 ENCOUNTER — TELEPHONE (OUTPATIENT)
Dept: GASTROENTEROLOGY | Facility: CLINIC | Age: 67
End: 2017-05-03

## 2017-05-03 ENCOUNTER — CARE COORDINATION (OUTPATIENT)
Dept: GASTROENTEROLOGY | Facility: CLINIC | Age: 67
End: 2017-05-03

## 2017-05-03 ENCOUNTER — SURGERY (OUTPATIENT)
Age: 67
End: 2017-05-03

## 2017-05-03 VITALS
TEMPERATURE: 98.4 F | SYSTOLIC BLOOD PRESSURE: 122 MMHG | HEIGHT: 64 IN | WEIGHT: 204.59 LBS | OXYGEN SATURATION: 97 % | BODY MASS INDEX: 34.93 KG/M2 | DIASTOLIC BLOOD PRESSURE: 80 MMHG | HEART RATE: 71 BPM | RESPIRATION RATE: 16 BRPM

## 2017-05-03 DIAGNOSIS — I85.00 IDIOPATHIC ESOPHAGEAL VARICES WITHOUT BLEEDING (H): Primary | ICD-10-CM

## 2017-05-03 PROCEDURE — 40000879 ZZH CANCELLED SURGERY UP TO 16-30 MINS: Performed by: INTERNAL MEDICINE

## 2017-05-03 NOTE — PROGRESS NOTES
Message received:  Phil Garland planned ERCP will need to be rescheduled as she just had banding with Elisabeth a week ago at [facility]. This seems to be a scheduling error.     Mireille; please reschedule in the next 6-8 weeks with me     Sammy, just an FYI   Fernando                Sent to scheduling to organize.     Mireille HERNANDEZ, RN Coordinator  Dr. Gallardo, Dr. West & Dr. Au  Pancreas~Biliary  [phone] #4

## 2017-05-03 NOTE — PROGRESS NOTES
This patient needs repeat EGD in 3 weeks for evaluation for further banding of esophageal varices.    Sammy Perez MD    [facility]  Division of Gastroenterology, Hepatology and Nutrition

## 2017-05-03 NOTE — TELEPHONE ENCOUNTER
Message left for patient to call back to schedule follow up EGD with Dr. Perez.     Natalia Kwon RN

## 2017-05-12 DIAGNOSIS — Z94.4 LIVER REPLACED BY TRANSPLANT (H): ICD-10-CM

## 2017-05-12 RX ORDER — FERROUS GLUCONATE 324(38)MG
1 TABLET ORAL
Qty: 90 TABLET | Refills: 1 | Status: SHIPPED | OUTPATIENT
Start: 2017-05-12 | End: 2017-01-01

## 2017-05-15 ENCOUNTER — TRANSFERRED RECORDS (OUTPATIENT)
Dept: HEALTH INFORMATION MANAGEMENT | Facility: CLINIC | Age: 67
End: 2017-05-15

## 2017-05-17 ENCOUNTER — TELEPHONE (OUTPATIENT)
Dept: GASTROENTEROLOGY | Facility: CLINIC | Age: 67
End: 2017-05-17

## 2017-05-17 ENCOUNTER — TELEPHONE (OUTPATIENT)
Dept: GASTROENTEROLOGY | Facility: OUTPATIENT CENTER | Age: 67
End: 2017-05-17

## 2017-05-17 DIAGNOSIS — I85.00 ESOPHAGEAL VARICES (H): Primary | ICD-10-CM

## 2017-05-17 NOTE — TELEPHONE ENCOUNTER
Follow up EGD confirmed with patient. Patient said her  will be accompanying her again. She reports no changes in her health status since last pre-assessment.

## 2017-05-22 DIAGNOSIS — Z96.612 STATUS POST REVERSE TOTAL ARTHROPLASTY OF LEFT SHOULDER: Primary | ICD-10-CM

## 2017-05-24 ENCOUNTER — OFFICE VISIT (OUTPATIENT)
Dept: ORTHOPEDICS | Facility: CLINIC | Age: 67
End: 2017-05-24

## 2017-05-24 ENCOUNTER — TRANSFERRED RECORDS (OUTPATIENT)
Dept: HEALTH INFORMATION MANAGEMENT | Facility: CLINIC | Age: 67
End: 2017-05-24

## 2017-05-24 ENCOUNTER — DOCUMENTATION ONLY (OUTPATIENT)
Dept: GASTROENTEROLOGY | Facility: OUTPATIENT CENTER | Age: 67
End: 2017-05-24

## 2017-05-24 DIAGNOSIS — Z96.612 STATUS POST REVERSE TOTAL ARTHROPLASTY OF LEFT SHOULDER: Primary | ICD-10-CM

## 2017-05-24 DIAGNOSIS — I85.00 IDIOPATHIC ESOPHAGEAL VARICES WITHOUT BLEEDING (H): Primary | ICD-10-CM

## 2017-05-24 RX ORDER — NICOTINE POLACRILEX 4 MG/1
20 GUM, CHEWING ORAL 2 TIMES DAILY
Qty: 14 TABLET | Refills: 0 | Status: SHIPPED | OUTPATIENT
Start: 2017-05-24 | End: 2017-01-01

## 2017-05-24 NOTE — LETTER
5/24/2017       RE: Miriam Magana  [id number]  [facility] [address]     Dear Colleague,    Thank you for referring your patient, Miriam Magana, to the [facility] at [facility]. Please see a copy of my visit note below.    CHIEF CONCERN: Status post left reverse total shoulder arthroplasty  DATE OF SURGERY: 12/6/16    HISTORY OF PRESENT ILLNESS: Mrs. Magana is now 6 months status post the above left shoulder procedure. She reports a SANE score of 90 for her Left shoulder but only 40 for her Right shoulder. She tells me today that she was diagnosed with primary billiary cirrhosis. She has also started to need frequent paracentesis. She says she had 2.5 L removed 1.5 months ago, then 1.6L a month later. She had 2.6 L removed a week ago. She was started on increased dose of diuretics for this. She is planning a trip driving to the [place] in the next few weeks with her sister.    PHYSICAL EXAM:  Adule female in NAD. Accompanied by her .  Respirations even and unlabored  Articulates and communicates with normal affect.  Left shoulder: CMS intact into the hand. Left shoulder ROM is active FE to 155, ER at side to 30, Internal rotation to T12.  Right shoulder AROM is 150/10/T10.    IMAGING:   None new    ASSESSMENT:  1. Six months status post left shoulder surgery above  2. Right shoulder irreparable cuff tear on MRI     PLAN:  -Discussed the risks of right shoulder arthroplasty in the setting of ongoing paracentesis. I would advise we gain an understanding of the long term management of her liver disease before committing to any right shoulder arthroplasty.  -Return in 6 mos for followup for left shoulder, sooner for right if needed.      Again, thank you for allowing me to participate in the care of your patient.      Sincerely,    Sue Holcomb MD

## 2017-05-24 NOTE — PROGRESS NOTES
CHIEF CONCERN: Status post left reverse total shoulder arthroplasty  DATE OF SURGERY: 12/6/16    HISTORY OF PRESENT ILLNESS: Mrs. Magana is now 6 months status post the above left shoulder procedure. She reports a SANE score of 90 for her Left shoulder but only 40 for her Right shoulder. She tells me today that she was diagnosed with primary billiary cirrhosis. She has also started to need frequent paracentesis. She says she had 2.5 L removed 1.5 months ago, then 1.6L a month later. She had 2.6 L removed a week ago. She was started on increased dose of diuretics for this. She is planning a trip driving to the [place] in the next few weeks with her sister.    PHYSICAL EXAM:  Adule female in NAD. Accompanied by her .  Respirations even and unlabored  Articulates and communicates with normal affect.  Left shoulder: CMS intact into the hand. Left shoulder ROM is active FE to 155, ER at side to 30, Internal rotation to T12.  Right shoulder AROM is 150/10/T10.    IMAGING:   None new    ASSESSMENT:  1. Six months status post left shoulder surgery above  2. Right shoulder irreparable cuff tear on MRI     PLAN:  -Discussed the risks of right shoulder arthroplasty in the setting of ongoing paracentesis. I would advise we gain an understanding of the long term management of her liver disease before committing to any right shoulder arthroplasty.  -Return in 6 mos for followup for left shoulder, sooner for right if needed.

## 2017-05-24 NOTE — PROGRESS NOTES
EGD performed with grade II varices and previous scarring from banding in distal esophagus.     3 bands placed in the lower esophagus, but one fell off as it did not hold given scar tissue. Another band was placed successfully in this region though due to scar tissue it did not grab as much tissue as I typically prefer. No bleeding noted before or after bands were placed.    After the procedure the patient and  notified me she is going on a 7-10 day car trip to the [place] with her sister.    I called Dr. Ureña, the patient's primary hepatologist. I discussed the situation with Dr. Ureña. He believes patient is ok to travel.    I discussed in detail with Miriam and her  what to watch for. If she has any signs of bleeding (black or red stool, hematemesis, lightheadedness, SOB, etc) then she should seek immediate medical attention. She is aware of what to look out for. She is aware of the hospitals along her route.    She will take omeprazole 20 mg PO BID for 7 days and then decrease dose back to 20 mg daily.    Sammy Perez MD    [facility]  Division of Gastroenterology, Hepatology and Nutrition

## 2017-05-24 NOTE — MR AVS SNAPSHOT
After Visit Summary   5/24/2017    Miriam Magana    MRN: [id number]           Patient Information     Date Of Birth          1950        Visit Information        Provider Department      5/24/2017 9:00 AM Sue Holcomb MD [facility]        Today's Diagnoses     Status post reverse total arthroplasty of left shoulder    -  1       Follow-ups after your visit        Your next 10 appointments already scheduled     Sep 20, 2017 11:15 AM CDT   Lab with  LAB   [facility] ([facility])    [address] [place] [id number] [phone]            Sep 20, 2017 12:15 PM CDT   (Arrive by 12:00 PM)   Return Liver Transplant with Vaibhav Ureña MD   [facility] ([facility])    [address] [place] [id number] [phone]            Dec 29, 2017 10:45 AM CST   Lab with  LAB   [facility] ([facility])    [address] [place] [id number] [phone]            Dec 29, 2017 11:30 AM CST   (Arrive by 11:15 AM)   Return Liver Transplant with Vaibhav Ureña MD   [facility] ([facility])    [address] [place] [id number] [phone]              Who to contact     Please call your clinic at [phone] to:    Ask questions about your health    Make or cancel appointments    Discuss your medicines    Learn about your test results    Speak to your doctor   If you have compliments or concerns about an experience at your clinic, or if you wish to file a complaint, please contact [facility] Patient Relations at [phone] or email us at [email or web address]         Additional Information About Your Visit        MyChart Information     MyChart gives you secure access to your electronic health record. If you see a primary care  provider, you can also send messages to your care team and make appointments. If you have questions, please call your primary care clinic.  If you do not have a primary care provider, please call [phone] and they will assist you.      Powervation is an electronic gateway that provides easy, online access to your medical records. With [employer]ervation, you can request a clinic appointment, read your test results, renew a prescription or communicate with your care team.     To access your existing account, please contact your [facility] or call [phone] for assistance.        Care EveryWhere ID     This is your Care EveryWhere ID. This could be used by other organizations to access your [place] medical records  [id number]         Blood Pressure from Last 3 Encounters:   05/03/17 122/80   03/24/17 146/79   12/30/16 144/72    Weight from Last 3 Encounters:   05/03/17 92.8 kg (204 lb 9.4 oz)   03/24/17 92.3 kg (203 lb 6.4 oz)   03/01/17 91.6 kg (201 lb 14.4 oz)              Today, you had the following     No orders found for display         Today's Medication Changes          These changes are accurate as of: 5/24/17  1:35 PM.  If you have any questions, ask your nurse or doctor.               These medicines have changed or have updated prescriptions.        Dose/Directions    insulin glargine 100 UNIT/ML injection   Commonly known as:  LANTUS   This may have changed:    - how much to take  - how to take this  - when to take this  - additional instructions   Used for:  Type II diabetes mellitus (H)        Start with 11 units of Lantus at night when you go home. Check your blood sugars at least twice a day at home and if you have hypoglycemia symptoms; please adjust insulin appropriately   Quantity:  3 mL   Refills:  0       insulin lispro 100 UNIT/ML injection   Commonly known as:  HumaLOG KWIKpen   This may have changed:    - how much to take  - when to take this  - additional  instructions   Used for:  Type II diabetes mellitus (H)        Use 2.5 Units/15gm carbs with meals. Sliding scale as needed:   Do Not give Correction Insulin if Pre-Meal BG < 140.  For Pre-Meal  - 189 give 1 unit.  For Pre-Meal  - 239 give 2 units.  For Pre-Meal  - 289 give 3 units.  For Pre-Meal  - 339 give 4 units.  For Pre-Meal - 399 give 5 units.  For Pre-Meal -449 give 6 units For Pre-Meal BG = or > 450 give 7 units.   Quantity:  1 Month   Refills:  0       LORazepam 1 MG tablet   Commonly known as:  ATIVAN   This may have changed:    - how much to take  - when to take this   Used for:  Depressive disorder        Dose:  0.5 mg   Take 0.5 tablets (0.5 mg) by mouth every 6 hours as needed   Quantity:  30 tablet   Refills:  0       * omeprazole 20 MG CR capsule   Commonly known as:  priLOSEC   This may have changed:  Another medication with the same name was added. Make sure you understand how and when to take each.   Used for:  Gastroesophageal reflux disease without esophagitis   Changed by:  Vaibhav Ureña MD        Dose:  20 mg   Take 1 capsule (20 mg) by mouth daily   Quantity:  90 capsule   Refills:  3       * omeprazole 20 MG tablet   This may have changed:  You were already taking a medication with the same name, and this prescription was added. Make sure you understand how and when to take each.   Used for:  Idiopathic esophageal varices without bleeding (H)   Changed by:  Sammy Perez MD        Dose:  20 mg   Take 1 tablet (20 mg) by mouth 2 times daily   Quantity:  14 tablet   Refills:  0       pramipexole 0.125 MG tablet   Commonly known as:  MIRAPEX   This may have changed:  how much to take   Used for:  Insomnia        Dose:  0.125-0.25 mg   Take 1-2 tablets (0.125-0.25 mg) by mouth At Bedtime   Quantity:  90 tablet   Refills:  0       traZODone 50 MG tablet   Commonly known as:  DESYREL   This may have changed:  how much to take   Used for:  Insomnia         Dose:   mg   Take 1-2 tablets ( mg) by mouth nightly as needed for sleep   Quantity:  60 tablet   Refills:  0       * Notice:  This list has 2 medication(s) that are the same as other medications prescribed for you. Read the directions carefully, and ask your doctor or other care provider to review them with you.         Where to get your medicines      These medications were sent to [facility] [address]  [address], [facility] [address]     Phone:  [phone]     omeprazole 20 MG tablet                Primary Care Provider Office Phone # Fax #    Yvon Lynn [phone]       [address] [address]        Thank you!     Thank you for choosing [facility]  for your care. Our goal is always to provide you with excellent care. Hearing back from our patients is one way we can continue to improve our services. Please take a few minutes to complete the written survey that you may receive in the mail after your visit with us. Thank you!             Your Updated Medication List - Protect others around you: Learn how to safely use, store and throw away your medicines at www.disposemymeds.org.          This list is accurate as of: 5/24/17  1:35 PM.  Always use your most recent med list.                   Brand Name Dispense Instructions for use    acetaminophen 500 MG tablet    TYLENOL     Take 1,000 mg by mouth 2 times daily       ARTIFICIAL TEARS 1.4 % ophthalmic solution   Generic drug:  polyvinyl alcohol      Place 1 drop into both eyes as needed       atorvastatin 40 MG tablet    LIPITOR     TAKE 1 TABLET BY MOUTH ONCE DAILY.       brexpiprazole 0.5 MG tablet    REXULTI     Take 0.5 mg by mouth daily       * buPROPion 300 MG 24 hr tablet    WELLBUTRIN XL     Take 1 tablet by mouth daily       * buPROPion 150 MG 24 hr tablet    WELLBUTRIN XL     Take 150 mg by mouth  every morning with 300mg total dose of 450mg       BusPIRone HCl 30 MG Tabs      Take  by mouth 2 times daily.       calcium citrate-vitamin D 315-250 MG-UNIT Tabs per tablet    CITRACAL     Take 2 tablets by mouth 2 times daily.       cetirizine 10 MG tablet    zyrTEC     TAKE 1 TABLET BY MOUTH ONCE DAILY.       ferrous gluconate 324 (38 FE) MG tablet    FERGON    90 tablet    Take 1 tablet (324 mg) by mouth 3 times daily (with meals)       fluticasone 50 MCG/ACT spray    FLONASE     Spray 1 spray into both nostrils 2 times daily       furosemide 40 MG tablet    LASIX    90 tablet    Take 1 tablet (40 mg) by mouth daily       gabapentin 300 MG capsule    NEURONTIN    90 capsule    TAKE 1 CAPSULE BY MOUTH THREE TIMES DAILY. START WITH 1 CAPSULE AT BEDTIME. MAY INCREASE TO 1 CAPSULE THREE TIMES DAILY AS TOLERATED.       insulin glargine 100 UNIT/ML injection    LANTUS    3 mL    Start with 11 units of Lantus at night when you go home. Check your blood sugars at least twice a day at home and if you have hypoglycemia symptoms; please adjust insulin appropriately       insulin lispro 100 UNIT/ML injection    HumaLOG KWIKpen    1 Month    Use 2.5 Units/15gm carbs with meals. Sliding scale as needed:   Do Not give Correction Insulin if Pre-Meal BG < 140.  For Pre-Meal  - 189 give 1 unit.  For Pre-Meal  - 239 give 2 units.  For Pre-Meal  - 289 give 3 units.  For Pre-Meal  - 339 give 4 units.  For Pre-Meal - 399 give 5 units.  For Pre-Meal -449 give 6 units For Pre-Meal BG = or > 450 give 7 units.       LORazepam 1 MG tablet    ATIVAN    30 tablet    Take 0.5 tablets (0.5 mg) by mouth every 6 hours as needed       metFORMIN 500 MG tablet    GLUCOPHAGE     750 mg at HS       multivitamin, therapeutic with minerals Tabs tablet      Take 1 tablet by mouth daily       mycophenolate tablet     60 tablet    Take 2 tablets (1,000 mg) by mouth 2 times daily       * omeprazole 20 MG CR capsule     priLOSEC    90 capsule    Take 1 capsule (20 mg) by mouth daily       * omeprazole 20 MG tablet     14 tablet    Take 1 tablet (20 mg) by mouth 2 times daily       pramipexole 0.125 MG tablet    MIRAPEX    90 tablet    Take 1-2 tablets (0.125-0.25 mg) by mouth At Bedtime       predniSONE 5 MG tablet    DELTASONE    30 tablet    Take 1 tablet (5 mg) by mouth daily       propranolol 40 MG tablet    INDERAL    60 tablet    Take 1 tablet (40 mg) by mouth 2 times daily       senna-docusate 8.6-50 MG per tablet    SENOKOT-S;PERICOLACE    60 tablet    Take 1-2 tablets by mouth 2 times daily as needed for constipation (while taking narcotic pain medications)       sertraline 100 MG tablet    ZOLOFT     Take 200 mg by mouth daily AM       TORSEMIDE PO          traZODone 50 MG tablet    DESYREL    60 tablet    Take 1-2 tablets ( mg) by mouth nightly as needed for sleep       ursodiol 300 MG capsule    ACTIGALL    270 capsule    Take 2 caps in a.m. And 1 cap in p.m.       vitamin D 2000 UNITS tablet      Take 2 tablets by mouth daily AM       * Notice:  This list has 4 medication(s) that are the same as other medications prescribed for you. Read the directions carefully, and ask your doctor or other care provider to review them with you.

## 2017-05-25 ENCOUNTER — TELEPHONE (OUTPATIENT)
Dept: GASTROENTEROLOGY | Facility: CLINIC | Age: 67
End: 2017-05-25

## 2017-05-30 ENCOUNTER — TELEPHONE (OUTPATIENT)
Dept: GASTROENTEROLOGY | Facility: CLINIC | Age: 67
End: 2017-05-30

## 2017-05-30 NOTE — TELEPHONE ENCOUNTER
Miriam is informed that she is scheduled on 07/05/2017 at 10 AM with an arrival time of 8 AM.  She knows she will need an updated pre-op physical.   She knows to arrange for a  and someone to monitor her for 24 hours post procedure.   All instructions along with the pre-op physical form will be mailed to Miriam at her address listed in EPIC, it was verified to be current during this time.     SR 05/30/2017  1222a

## 2017-06-08 DIAGNOSIS — M19.019 SHOULDER ARTHRITIS: ICD-10-CM

## 2017-06-08 RX ORDER — GABAPENTIN 300 MG/1
CAPSULE ORAL
Qty: 90 CAPSULE | Refills: 0 | Status: SHIPPED | OUTPATIENT
Start: 2017-06-08 | End: 2017-01-01

## 2017-07-03 NOTE — OR NURSING
H&P PCP recommends Miriam take her full dose of Lantus the PM before surgery and hold the metformin the night before surgery.  Writer read this instruction back to patient and her .

## 2017-07-05 NOTE — ANESTHESIA POSTPROCEDURE EVALUATION
Patient: Miriam Magana    Procedure(s):  Endoscopic Retrograde Cholangiopancreatogram with Biliary Stone Removal, Biliary Duct Dilation, Biliary Duct Stent Placement X2 - Wound Class: II-Clean Contaminated    Diagnosis:Gastrointestinal Bleed  Diagnosis Additional Information: No value filed.    Anesthesia Type:  General, ETT    Note:  Anesthesia Post Evaluation    Patient location during evaluation: PACU  Patient participation: Able to fully participate in evaluation  Level of consciousness: awake  Pain management: satisfactory to patient  Airway patency: patent  Cardiovascular status: acceptable  Respiratory status: acceptable  Hydration status: acceptable  PONV: none     Anesthetic complications: None          Last vitals:  Vitals:    07/05/17 0833   BP: (!) 158/93   Pulse: 62   Resp: 16   Temp: 37  C (98.6  F)   SpO2: 98%         Electronically Signed By: Vaibhav Cárdenas MD  July 5, 2017  11:55 AM

## 2017-07-05 NOTE — OP NOTE
[address] [place] [address] [phone]     Endoscopy Department   _______________________________________________________________________________   Patient Name: Miriam Magana           Procedure Date: 7/5/2017 10:16 AM   MRN: [id number]                       Account Number: [id number]   YOB: 1950              Admit Type: Outpatient   Age: 67                               Room: OR   Gender: Female                        Note Status: Finalized   Attending MD: Fernando West MD   Total Sedation Time:   _______________________________________________________________________________       Procedure:           ERCP   Indications:         Suspected ascending cholangitis, Post liver transplant                        assessment   Providers:           Fernando West MD, Jennifer Vanderheyden, Adam A. 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                        This has intermittently needed sweep and she has recent                        history of recurrent fevers now accompanied by ascites,                        the latter requiring monthly drainage. She now proceeds                        to further management by ERCP.   Referring MD:        Vaibhav Ureña MD   Medicines:           General Anesthesia, Cipro 400 mg IV, Indomethacin not                        given due to contraindication   Complications:       No immediate complications.   _______________________________________________________________________________   Procedure:           Pre-Anesthesia Assessment:                        - Prior to the procedure, a History and Physical was                        performed, and patient medications and allergies were                         reviewed. The patient is competent. The risks and                        benefits of the procedure and the sedation options and                        risks were discussed with the patient. All questions                        were answered and informed consent was obtained. Patient                        identification and proposed procedure were verified by                        the nurse in the pre-procedure area. Mental Status                        Examination: alert and oriented. Airway Examination:                        Mallampati Class II (the uvula but not tonsillar pillars                        visualized). Respiratory Examination: clear to                        auscultation. CV Examination: systolic murmur. ASA Grade                        Assessment: II - A patient with mild systemic disease.                        After reviewing the risks and benefits, the patient was                        deemed in satisfactory condition to undergo the                        procedure. The anesthesia plan was to use general                        anesthesia. Immediately prior to administration of                        medications, the patient was re-assessed for adequacy to                        receive sedatives. The heart rate, respiratory rate,                        oxygen saturations, blood pressure, adequacy of                        pulmonary ventilation, and response to care were                        monitored throughout the procedure. The physical status                        of the patient was re-assessed after the procedure.                        After obtaining informed consent, the scope was passed                        under direct vision. Throughout the procedure, the                        patient's blood pressure, pulse, and oxygen saturations                        were monitored continuously. The duodenoscope was                        introduced through the mouth,  "and used to inject                        contrast into and used to inject contrast into the bile                        duct. The ERCP was accomplished without difficulty. The                        patient tolerated the procedure well.                                                                                     Findings:        A  film demonstrated the in situ biliary stent and surrounding        clips. Limited white light views of the foregut were unremarkable save        for widely patent biliary sphincterotomy. The bile duct was selectively        deeply cannulated with the short-nosed traction sphincterotome in        concert with knuckled straight and angled 0.025\" Visiglide wires.        Contrast was injected and I personally interpreted the bile duct images.        Ductal flow of contrast was adequate, image quality was adequate and        contrast extended throughout the intrahepatics. There was evidence of a        mild recurrent stenosis at the anastomosis with irregular filling        defects from bifurcation to ampulla suggestive of stone and debris.        Aggressive cholangiography of the intrahepatics was avoided, however        there appeared diffuse mild dilation. The anastomotic stenosis was then        dilation to 10mm with a Hurricane. Again there was suggestion of near        obstruction of the primary insertions at the bifurcation due to UCSEMS.        The biliary tree was swept with a 12 mm balloon starting at the left        intrahepatic duct(s) and right intrahepatic duct(s). Sludge and small        black stones were swept clear from the duct. An 8.5 Fr by 12 cm        transpapillary Zimmon SPT stent with a full external pigtail and a        single internal flap was placed 12 cm into a left intrahepatic duct. An        8.5 Fr by 12 cm transpapillary Zimmon SPT stent with a full external        pigtail and a single internal flap was placed 12 cm into a right        intrahepatic " duct. Bile flowed through the stent. The stent was in good        position. The ventral pancreatic duct was selectively not interrogated.                                                                                    Impression:          - Patent biliary sphincterotomy                        - In situ UCSEMS fully internalized from bifuraction                        across the anastomosis to the mid to lower common,                        dilated to 10mm                        - Evidence of near obstruction of the primary insertions                        with moderate diffuse upstream dilation due to proximity                        of the stent                        - Choledocholithiasis and sludge, cleared with sweep                        - Successful deployment of two 8.5F SPT Zimmon stents to                        the left and right intrahepatics   Recommendation:      - Standard outpatient general anesthesia recovery with                        probable discharge home this afternoon                        - Complete a short course of antibiotic as prescribed                        - Follow up with established hepatologists as scheduled                        - Repeat ERCP in 8 weeks with consideration for stent                        free trial                        - The findings and recommendations were discussed with                        the patient and their family                                                                                       electronically signed by MIRNA West   _____________________   Fernando West MD   7/5/2017 11:53:18 AM   I was physically present for the entire viewing portion of the exam.   __________________________   Signature of teaching physician   Ramakrishna/Juan West MD

## 2017-07-05 NOTE — DISCHARGE INSTRUCTIONS
Please complete 5 days of Ciprofloxacin starting today.  You will be contacted about a repeat ERCP in 6-8 weeks.       [facility], [facility]  Same-Day Surgery   Adult Discharge Orders & Instructions     For 24 hours after surgery    1. Get plenty of rest.  A responsible adult must stay with you for at least 24 hours after you leave the hospital.   2. Do not drive or use heavy equipment.  If you have weakness or tingling, don't drive or use heavy equipment until this feeling goes away.  3. Do not drink alcohol.  4. Avoid strenuous or risky activities.  Ask for help when climbing stairs.   5. You may feel lightheaded.  IF so, sit for a few minutes before standing.  Have someone help you get up.   6. If you have nausea (feel sick to your stomach): Drink only clear liquids such as apple juice, ginger ale, broth or 7-Up.  Rest may also help.  Be sure to drink enough fluids.  Move to a regular diet as you feel able.  7. You may have a slight fever. Call the doctor if your fever is over 100 F (37.7 C) (taken under the tongue) or lasts longer than 24 hours.  8. You may have a dry mouth, a sore throat, muscle aches or trouble sleeping.  These should go away after 24 hours.  9. Do not make important or legal decisions.   Call your doctor for any of the followin.  Signs of infection (fever, growing tenderness at the surgery site, a large amount of drainage or bleeding, severe pain, foul-smelling drainage, redness, swelling).    2. It has been over 8 to 10 hours since surgery and you are still not able to urinate (pass water).    3.  Headache for over 24 hours.    To contact a doctor, call ___Dr West's office at [phone] or [phone] (clinic) or [phone] (office)______ or:        [phone] and ask for the resident on call for   ______________________________________________ (answered 24 hours a day)      Emergency Department:    [facility]: [phone]       (TTY for  hearing impaired: [phone])

## 2017-07-05 NOTE — IP AVS SNAPSHOT
Post Anesthesia Care Unit [address] [address]    Phone:  [phone]                                       After Visit Summary   7/5/2017    Miriam Magana    MRN: [id number]           After Visit Summary Signature Page     I have received my discharge instructions, and my questions have been answered. I have discussed any challenges I see with this plan with the nurse or doctor.    ..........................................................................................................................................  Patient/Patient Representative Signature      ..........................................................................................................................................  Patient Representative Print Name and Relationship to Patient    ..................................................               ................................................  Date                                            Time    ..........................................................................................................................................  Reviewed by Signature/Title    ...................................................              ..............................................  Date                                                            Time

## 2017-07-05 NOTE — IP AVS SNAPSHOT
MRN:[id number]                      After Visit Summary   7/5/2017    Miriam Magana    MRN: [id number]           Thank you!     Thank you for choosing [facility] for your care. Our goal is always to provide you with excellent care. Hearing back from our patients is one way we can continue to improve our services. Please take a few minutes to complete the written survey that you may receive in the mail after you visit with us. Thank you!        Patient Information     Date Of Birth          1950        About your hospital stay     You were admitted on:  July 5, 2017 You last received care in the:  Post Anesthesia Care Unit [facility]    You were discharged on:  July 5, 2017       Who to Call     For medical emergencies, please call 911.  For non-urgent questions about your medical care, please call your primary care provider or clinic, [phone]  For questions related to your surgery, please call your surgery clinic        Attending Provider     Provider Fernando Reyes MD Gastroenterology       Primary Care Provider Office Phone # Fax #    Yvon Lynn [phone]      After Care Instructions     Discharge Instructions       Resume pre procedure diet            Discharge Instructions       Restart home medications.                  Your next 10 appointments already scheduled     Aug 10, 2017   Procedure with Malu Morris MD   [facility], [facility], Endoscopy ([facility])    [address] [id number] [phone]           The [facility] is located on the corner of [address] and [address] on the [address]. It is easily accessible from virtually any point [place], via [address] and [address].            Sep 20, 2017 11:15 AM CDT   Lab with [facility] ([facility])    [address]  [address]  [facility] [address]   [phone]            Sep 20, 2017 12:15 PM CDT   (Arrive by 12:00 PM)   Return Liver Transplant with MD BHAVANA [facility] Hepatology ([facility])    [address] [place] [address]  [address]rd [facility] [address]   [phone]            Dec 29, 2017 10:45 AM CST   Lab with  LAB   [facility] ([facility])    [address] [place] [address]   [phone]            Dec 29, 2017 11:30 AM CST   (Arrive by 11:15 AM)   Return Liver Transplant with MD BHAVANA [facility] Hepatology ([facility])    [address] [place] [address]   [phone]              Further instructions from your care team       Please complete 5 days of Ciprofloxacin starting today.  You will be contacted about a repeat ERCP in 6-8 weeks.       [facility]  Same-Day Surgery   Adult Discharge Orders & Instructions     For 24 hours after surgery    1. Get plenty of rest.  A responsible adult must stay with you for at least 24 hours after you leave the hospital.   2. Do not drive or use heavy equipment.  If you have weakness or tingling, don't drive or use heavy equipment until this feeling goes away.  3. Do not drink alcohol.  4. Avoid strenuous or risky activities.  Ask for help when climbing stairs.   5. You may feel lightheaded.  IF so, sit for a few minutes before standing.  Have someone help you get up.   6. If you have nausea (feel sick to your stomach): Drink only clear liquids such as apple juice, ginger ale, broth or 7-Up.  Rest may also help.  Be sure to drink enough fluids.  Move to a regular diet as you feel able.  7. You may have a slight fever. Call the doctor if your fever is over 100 F (37.7 C) (taken under the tongue) or lasts longer than 24 hours.  8. You may have a dry mouth, a sore throat, muscle aches or trouble  "sleeping.  These should go away after 24 hours.  9. Do not make important or legal decisions.   Call your doctor for any of the followin.  Signs of infection (fever, growing tenderness at the surgery site, a large amount of drainage or bleeding, severe pain, foul-smelling drainage, redness, swelling).    2. It has been over 8 to 10 hours since surgery and you are still not able to urinate (pass water).    3.  Headache for over 24 hours.    To contact a doctor, call ___Dr West's office at [phone] or [phone] (clinic) or [phone] (office)______ or:        [phone] and ask for the resident on call for   ______________________________________________ (answered 24 hours a day)      Emergency Department:    [facility]: [phone]       (TTY for hearing impaired: [phone])            Pending Results     No orders found from 7/3/2017 to 2017.            Admission Information     Date & Time Provider Department Dept. Phone    2017 Fernando West MD Post Anesthesia Care Unit [facility] [phone]      Your Vitals Were     Blood Pressure Pulse Temperature Respirations Height Weight    147/78 62 98.7  F (37.1  C) (Oral) 16 1.626 m (5' 4\") 89.2 kg (196 lb 10.4 oz)    Pulse Oximetry BMI (Body Mass Index)                98% 33.76 kg/m2          MyChart Information     [facility] gives you secure access to your electronic health record. If you see a primary care provider, you can also send messages to your care team and make appointments. If you have questions, please call your primary care clinic.  If you do not have a primary care provider, please call [phone] and they will assist you.        Care EveryWhere ID     This is your Care EveryWhere ID. This could be used by other organizations to access your [facility] medical records  [id number]        Equal Access to Services     DOUG HOBBS : Mary Florence, lisa luna, jon gonsales, " karrie zee karlan shireen khaubreesh la'aan ah. So [facility] [phone].    ATENCIÓN: Si habla patrick, tiene a buckner disposición servicios gratuitos de asistencia lingüística. Brian hoskins [phone].    We comply with applicable federal civil rights laws and [place] laws. We do not discriminate on the basis of race, color, national origin, age, disability sex, sexual orientation or gender identity.               Review of your medicines      START taking        Dose / Directions    ciprofloxacin 500 MG tablet   Commonly known as:  CIPRO   Used for:  Biliary obstruction of transplanted liver (H)        Dose:  500 mg   Take 1 tablet (500 mg) by mouth 2 times daily   Quantity:  14 tablet   Refills:  0         CONTINUE these medicines which may have CHANGED, or have new prescriptions. If we are uncertain of the size of tablets/capsules you have at home, strength may be listed as something that might have changed.        Dose / Directions    insulin glargine 100 UNIT/ML injection   Commonly known as:  LANTUS   This may have changed:    - how much to take  - how to take this  - when to take this  - additional instructions   Used for:  Type II diabetes mellitus (H)        Start with 11 units of Lantus at night when you go home. Check your blood sugars at least twice a day at home and if you have hypoglycemia symptoms; please adjust insulin appropriately   Quantity:  3 mL   Refills:  0       insulin lispro 100 UNIT/ML injection   Commonly known as:  HumaLOG KWIKpen   This may have changed:    - how much to take  - when to take this  - additional instructions   Used for:  Type II diabetes mellitus (H)        Use 2.5 Units/15gm carbs with meals. Sliding scale as needed:   Do Not give Correction Insulin if Pre-Meal BG < 140.  For Pre-Meal  - 189 give 1 unit.  For Pre-Meal  - 239 give 2 units.  For Pre-Meal  - 289 give 3 units.  For Pre-Meal  - 339 give 4 units.  For Pre-Meal - 399 give 5 units.  For  Pre-Meal -449 give 6 units For Pre-Meal BG = or > 450 give 7 units.   Quantity:  1 Month   Refills:  0       LORazepam 1 MG tablet   Commonly known as:  ATIVAN   This may have changed:    - how much to take  - when to take this   Used for:  Depressive disorder        Dose:  0.5 mg   Take 0.5 tablets (0.5 mg) by mouth every 6 hours as needed   Quantity:  30 tablet   Refills:  0       pramipexole 0.125 MG tablet   Commonly known as:  MIRAPEX   This may have changed:  how much to take   Used for:  Insomnia        Dose:  0.125-0.25 mg   Take 1-2 tablets (0.125-0.25 mg) by mouth At Bedtime   Quantity:  90 tablet   Refills:  0       traZODone 50 MG tablet   Commonly known as:  DESYREL   This may have changed:  how much to take   Used for:  Insomnia        Dose:   mg   Take 1-2 tablets ( mg) by mouth nightly as needed for sleep   Quantity:  60 tablet   Refills:  0         CONTINUE these medicines which have NOT CHANGED        Dose / Directions    acetaminophen 500 MG tablet   Commonly known as:  TYLENOL        Dose:  1000 mg   Take 1,000 mg by mouth 2 times daily   Refills:  0       ARTIFICIAL TEARS 1.4 % ophthalmic solution   Generic drug:  polyvinyl alcohol        Dose:  1 drop   Place 1 drop into both eyes as needed   Refills:  0       atorvastatin 40 MG tablet   Commonly known as:  LIPITOR        TAKE 1 TABLET BY MOUTH ONCE DAILY.   Refills:  0       brexpiprazole 0.5 MG tablet   Commonly known as:  REXULTI        Dose:  0.5 mg   Take 0.5 mg by mouth daily (with breakfast)   Refills:  0       * buPROPion 300 MG 24 hr tablet   Commonly known as:  WELLBUTRIN XL        Dose:  1 tablet   Take 1 tablet by mouth daily   Refills:  0       * buPROPion 150 MG 24 hr tablet   Commonly known as:  WELLBUTRIN XL        Dose:  150 mg   Take 150 mg by mouth every morning with 300mg total dose of 450mg   Refills:  0       BusPIRone HCl 30 MG Tabs        Take  by mouth 2 times daily.   Refills:  0       calcium  citrate-vitamin D 315-250 MG-UNIT Tabs per tablet   Commonly known as:  CITRACAL        Dose:  2 tablet   Take 2 tablets by mouth 2 times daily.   Refills:  0       cetirizine 10 MG tablet   Commonly known as:  zyrTEC        TAKE 1 TABLET BY MOUTH ONCE DAILY.   Refills:  0       ferrous gluconate 324 (38 FE) MG tablet   Commonly known as:  FERGON   Used for:  Liver replaced by transplant (H)        Dose:  1 tablet   Take 1 tablet (324 mg) by mouth 3 times daily (with meals)   Quantity:  90 tablet   Refills:  1       fluticasone 50 MCG/ACT spray   Commonly known as:  FLONASE        Dose:  1 spray   Spray 1 spray into both nostrils 2 times daily   Refills:  0       furosemide 40 MG tablet   Commonly known as:  LASIX   Used for:  Other ascites        Dose:  40 mg   Take 1 tablet (40 mg) by mouth daily   Quantity:  90 tablet   Refills:  3       gabapentin 300 MG capsule   Commonly known as:  NEURONTIN   Used for:  Shoulder arthritis        TAKE (1) CAPSULE BY MOUTH THREE TIMES DAILY.   Quantity:  90 capsule   Refills:  0       metFORMIN 500 MG tablet   Commonly known as:  GLUCOPHAGE        750 mg at HS   Refills:  0       multivitamin, therapeutic with minerals Tabs tablet        Dose:  1 tablet   Take 1 tablet by mouth daily   Refills:  0       mycophenolate tablet   Used for:  Status post liver transplantation (H)        Dose:  1000 mg   Take 2 tablets (1,000 mg) by mouth 2 times daily   Quantity:  60 tablet   Refills:  11       * omeprazole 20 MG CR capsule   Commonly known as:  priLOSEC   Used for:  Gastroesophageal reflux disease without esophagitis        Dose:  20 mg   Take 1 capsule (20 mg) by mouth daily   Quantity:  90 capsule   Refills:  3       * omeprazole 20 MG tablet   Used for:  Idiopathic esophageal varices without bleeding (H)        Dose:  20 mg   Take 1 tablet (20 mg) by mouth 2 times daily   Quantity:  14 tablet   Refills:  0       predniSONE 5 MG tablet   Commonly known as:  DELTASONE   Used for:   Liver replaced by transplant (H)        Dose:  5 mg   Take 1 tablet (5 mg) by mouth daily   Quantity:  30 tablet   Refills:  11       propranolol 40 MG tablet   Commonly known as:  INDERAL   Used for:  Essential and other specified forms of tremor        Dose:  40 mg   Take 1 tablet (40 mg) by mouth 2 times daily   Quantity:  60 tablet   Refills:  0       senna-docusate 8.6-50 MG per tablet   Commonly known as:  SENOKOT-S;PERICOLACE   Used for:  Osteoarthritis of left glenohumeral joint        Dose:  1-2 tablet   Take 1-2 tablets by mouth 2 times daily as needed for constipation (while taking narcotic pain medications)   Quantity:  60 tablet   Refills:  1       sertraline 100 MG tablet   Commonly known as:  ZOLOFT        Dose:  200 mg   Take 200 mg by mouth daily AM   Refills:  0       TORSEMIDE PO        Dose:  80 mg   Take 80 mg by mouth daily   Refills:  0       ursodiol 300 MG capsule   Commonly known as:  ACTIGALL   Used for:  Liver replaced by transplant (H)        Take 2 caps in a.m. And 1 cap in p.m.   Quantity:  270 capsule   Refills:  3       vitamin D 2000 UNITS tablet        Dose:  2 tablet   Take 2 tablets by mouth daily AM   Refills:  0       * Notice:  This list has 4 medication(s) that are the same as other medications prescribed for you. Read the directions carefully, and ask your doctor or other care provider to review them with you.         Where to get your medicines      Some of these will need a paper prescription and others can be bought over the counter. Ask your nurse if you have questions.     Bring a paper prescription for each of these medications     ciprofloxacin 500 MG tablet                Protect others around you: Learn how to safely use, store and throw away your medicines at www.disposemymeds.org.             Medication List: This is a list of all your medications and when to take them. Check marks below indicate your daily home schedule. Keep this list as a reference.       Medications           Morning Afternoon Evening Bedtime As Needed    acetaminophen 500 MG tablet   Commonly known as:  TYLENOL   Take 1,000 mg by mouth 2 times daily                                ARTIFICIAL TEARS 1.4 % ophthalmic solution   Place 1 drop into both eyes as needed   Generic drug:  polyvinyl alcohol                                atorvastatin 40 MG tablet   Commonly known as:  LIPITOR   TAKE 1 TABLET BY MOUTH ONCE DAILY.                                brexpiprazole 0.5 MG tablet   Commonly known as:  REXULTI   Take 0.5 mg by mouth daily (with breakfast)                                * buPROPion 300 MG 24 hr tablet   Commonly known as:  WELLBUTRIN XL   Take 1 tablet by mouth daily                                * buPROPion 150 MG 24 hr tablet   Commonly known as:  WELLBUTRIN XL   Take 150 mg by mouth every morning with 300mg total dose of 450mg                                BusPIRone HCl 30 MG Tabs   Take  by mouth 2 times daily.                                calcium citrate-vitamin D 315-250 MG-UNIT Tabs per tablet   Commonly known as:  CITRACAL   Take 2 tablets by mouth 2 times daily.                                cetirizine 10 MG tablet   Commonly known as:  zyrTEC   TAKE 1 TABLET BY MOUTH ONCE DAILY.                                ciprofloxacin 500 MG tablet   Commonly known as:  CIPRO   Take 1 tablet (500 mg) by mouth 2 times daily                                ferrous gluconate 324 (38 FE) MG tablet   Commonly known as:  FERGON   Take 1 tablet (324 mg) by mouth 3 times daily (with meals)                                fluticasone 50 MCG/ACT spray   Commonly known as:  FLONASE   Spray 1 spray into both nostrils 2 times daily                                furosemide 40 MG tablet   Commonly known as:  LASIX   Take 1 tablet (40 mg) by mouth daily                                gabapentin 300 MG capsule   Commonly known as:  NEURONTIN   TAKE (1) CAPSULE BY MOUTH THREE TIMES DAILY.                                 insulin glargine 100 UNIT/ML injection   Commonly known as:  LANTUS   Start with 11 units of Lantus at night when you go home. Check your blood sugars at least twice a day at home and if you have hypoglycemia symptoms; please adjust insulin appropriately                                insulin lispro 100 UNIT/ML injection   Commonly known as:  HumaLOG KWIKpen   Use 2.5 Units/15gm carbs with meals. Sliding scale as needed:   Do Not give Correction Insulin if Pre-Meal BG < 140.  For Pre-Meal  - 189 give 1 unit.  For Pre-Meal  - 239 give 2 units.  For Pre-Meal  - 289 give 3 units.  For Pre-Meal  - 339 give 4 units.  For Pre-Meal - 399 give 5 units.  For Pre-Meal -449 give 6 units For Pre-Meal BG = or > 450 give 7 units.                                LORazepam 1 MG tablet   Commonly known as:  ATIVAN   Take 0.5 tablets (0.5 mg) by mouth every 6 hours as needed                                metFORMIN 500 MG tablet   Commonly known as:  GLUCOPHAGE   750 mg at HS                                multivitamin, therapeutic with minerals Tabs tablet   Take 1 tablet by mouth daily                                mycophenolate tablet   Take 2 tablets (1,000 mg) by mouth 2 times daily                                * omeprazole 20 MG CR capsule   Commonly known as:  priLOSEC   Take 1 capsule (20 mg) by mouth daily                                * omeprazole 20 MG tablet   Take 1 tablet (20 mg) by mouth 2 times daily                                pramipexole 0.125 MG tablet   Commonly known as:  MIRAPEX   Take 1-2 tablets (0.125-0.25 mg) by mouth At Bedtime                                predniSONE 5 MG tablet   Commonly known as:  DELTASONE   Take 1 tablet (5 mg) by mouth daily                                propranolol 40 MG tablet   Commonly known as:  INDERAL   Take 1 tablet (40 mg) by mouth 2 times daily                                senna-docusate 8.6-50 MG per tablet    Commonly known as:  SENOKOT-S;PERICOLACE   Take 1-2 tablets by mouth 2 times daily as needed for constipation (while taking narcotic pain medications)                                sertraline 100 MG tablet   Commonly known as:  ZOLOFT   Take 200 mg by mouth daily AM                                TORSEMIDE PO   Take 80 mg by mouth daily                                traZODone 50 MG tablet   Commonly known as:  DESYREL   Take 1-2 tablets ( mg) by mouth nightly as needed for sleep                                ursodiol 300 MG capsule   Commonly known as:  ACTIGALL   Take 2 caps in a.m. And 1 cap in p.m.                                vitamin D 2000 UNITS tablet   Take 2 tablets by mouth daily AM                                * Notice:  This list has 4 medication(s) that are the same as other medications prescribed for you. Read the directions carefully, and ask your doctor or other care provider to review them with you.

## 2017-07-05 NOTE — ANESTHESIA PREPROCEDURE EVALUATION
Anesthesia Evaluation     . Pt has had prior anesthetic. Type: General    No history of anesthetic complications          ROS/MED HX    ENT/Pulmonary:     (+)sleep apnea, , . .    Neurologic:  - neg neurologic ROS     Cardiovascular:     (+) Dyslipidemia, hypertension----. : . . . :. .       METS/Exercise Tolerance:     Hematologic:     (+) History of Transfusion -      Musculoskeletal:   (+) , , other musculoskeletal- arthritis      GI/Hepatic:     (+) liver disease, Other GI/Hepatic h/o gi bleed      Renal/Genitourinary:     (+) chronic renal disease, type: CRI,       Endo:     (+) type II DM .      Psychiatric:     (+) psychiatric history depression      Infectious Disease:  - neg infectious disease ROS       Malignancy:      - no malignancy   Other:    (+) H/O Chronic Pain,  - neg other ROS                 Physical Exam  Normal systems: cardiovascular and pulmonary    Airway   Mallampati: II  TM distance: >3 FB  Neck ROM: full    Dental   (+) upper dentures, partials and lower dentures    Cardiovascular       Pulmonary                     Anesthesia Plan      History & Physical Review  History and physical reviewed and following examination; no interval change.    ASA Status:  3 .    NPO Status:  > 8 hours    Plan for General and ETT with Intravenous and Propofol induction. Maintenance will be Balanced.    PONV prophylaxis:  Ondansetron (or other 5HT-3)       Postoperative Care  Postoperative pain management:  IV analgesics.      Consents  Anesthetic plan, risks, benefits and alternatives discussed with:  Patient..                          .

## 2017-07-05 NOTE — ANESTHESIA CARE TRANSFER NOTE
Patient: Miriam Magana    Procedure(s):  Endoscopic Retrograde Cholangiopancreatogram with Biliary Stone Removal, Biliary Duct Dilation, Biliary Duct Stent Placement X2 - Wound Class: II-Clean Contaminated    Diagnosis: Gastrointestinal Bleed  Diagnosis Additional Information: No value filed.    Anesthesia Type:   General, ETT     Note:  Airway :Face Mask  Patient transferred to:PACU  Comments: Stable, report given, Rn at bedside, SATS 98%      Vitals: (Last set prior to Anesthesia Care Transfer)    CRNA VITALS  7/5/2017 1113 - 7/5/2017 1149      7/5/2017             Pulse: 80    SpO2: 98 %    Resp Rate (observed): (!)  3                Electronically Signed By: BERLIN Michel CRNA  July 5, 2017  11:49 AM

## 2017-07-07 NOTE — PROGRESS NOTES
Post ERCP  (7/5/2017) with Dr. West: Follow-up    Post procedure recommendations: Follow up with established hepatologists as scheduled - Repeat ERCP in 8 weeks with consideration for stent free trial     Patient states she is feeling fine. Denies N/V/F and pain. She is able to eat and drink with no issues. She is amenable to repeat ERCP.     Orders placed: ERCP and sent to scheduling.     Contact information verified for future questions/concerns.    Mireille HERNANDEZ RN Coordinator  Dr. Gallardo, Dr. West & Dr. Ribera  [facility]  [phone]

## 2017-07-19 NOTE — TELEPHONE ENCOUNTER
Spoke to [place] who informed me that he is the one who does all the scheduling for Miriam.   He is informed that she is scheduled on 08/28/2017 at 8:15 Am with an arrival time of 6:15 AM.  He knows Miriam will need an updated H&P done by her PCP prior to procedure.  He will be driving her to this appointment and will monitor her for 24 hours post procedure.  All instructions along with the H&P form will be mailed to Miriam at her address listed in EPIC, it was confirmed during this call to be current.    SR 07/19/2017  [id number]

## 2017-08-07 NOTE — TELEPHONE ENCOUNTER
Patient scheduled for EGD    Indication for procedure. Idiopathic esophageal varices without bleeding    Referring Provider. Sammy Perez MD    ? Not Needed    Arrival time verified? Yes, 0730    Facility location verified? Yes, [address]    Instructions given regarding prep and procedure    Prep Type NPO    Are you taking any anticoagulants or blood thinners? No    Instructions given? N/A    Electronic implanted devices? No    Pre procedure teaching completed? Yes    Transportation from procedure?     [facility] / [facility]

## 2017-08-10 NOTE — ANESTHESIA CARE TRANSFER NOTE
Patient: Miriam Magana    Procedure(s):  EGD - Wound Class: II-Clean Contaminated    Diagnosis: 4 weeks follow up, esophageal varices and esophageal dilation  Diagnosis Additional Information: No value filed.    Anesthesia Type:   MAC     Note:  Airway :Room Air  Patient transferred to: Recovery        Vitals: (Last set prior to Anesthesia Care Transfer)    CRNA VITALS  8/10/2017 0851 - 8/10/2017 0925      8/10/2017             Pulse: 83    Ht Rate: 85    SpO2: 99 %                Electronically Signed By: BERLIN Harper CRNA  August 10, 2017  9:25 AM

## 2017-08-10 NOTE — ANESTHESIA POSTPROCEDURE EVALUATION
Patient: Miriam Magana    Procedure(s):  EGD - Wound Class: II-Clean Contaminated    Diagnosis:4 weeks follow up, esophageal varices and esophageal dilation  Diagnosis Additional Information: No value filed.    Anesthesia Type:  MAC    Note:  Anesthesia Post Evaluation    Patient location during evaluation: Endoscopy Recovery  Patient participation: Able to fully participate in evaluation  Level of consciousness: awake and alert  Pain management: adequate  Airway patency: patent  Cardiovascular status: acceptable and hemodynamically stable  Respiratory status: acceptable  Hydration status: acceptable  PONV: none     Anesthetic complications: None          Last vitals:  Vitals:    08/10/17 0749 08/10/17 0929   BP: 143/74 94/57   Pulse: 62 60   Resp: 18 18   Temp: 36.8  C (98.2  F) 36.4  C (97.5  F)   SpO2: 96% 94%         Electronically Signed By: Jason Turner MD  August 10, 2017  9:50 AM

## 2017-08-10 NOTE — ANESTHESIA PREPROCEDURE EVALUATION
Anesthesia Evaluation     . Pt has had prior anesthetic. Type: General and MAC    No history of anesthetic complications          ROS/MED HX    ENT/Pulmonary:     (+)sleep apnea, tobacco use, Past use uses CPAP , . .    Neurologic:       Cardiovascular:     (+) hypertension----. : . . . :. .       METS/Exercise Tolerance:     Hematologic: Comments: Thrombocytopenia.  Platelets 65 on 07/05.     (+) Other Hematologic Disorder-      Musculoskeletal:  - neg musculoskeletal ROS       GI/Hepatic: Comment: History of primary biliary cirrhosis status post liver transplantation in 1995.  Complicated by esophageal varices requiring banding on three occasions (2014, 2014, 2015), ascites (On torsemide 80 mg po daily, Lasix 40 mg po daily).    GERD:  On Prilosec 20 mg po daily.     (+) GERD Asymptomatic on medication, liver disease,       Renal/Genitourinary:     (+) chronic renal disease, type: CRI, Pt does not require dialysis, Pt has no history of transplant,       Endo: Comment: Type II diabetes mellitus:  On Lantus 55 units subq qhs, Humalog SSI, metformin 750 mg po qhs.  Patient took Lantus 55 units subq last night.      (+) type II DM Using insulin - not using insulin pump not previously admitted for DM/DKA Chronic steroid usage for Post Transplant Immunosuppression Obesity, .      Psychiatric: Comment: Depression:  On Zoloft 200 mg po daily, Wellbutrin 450 mg po daily.     (+) psychiatric history depression      Infectious Disease:  - neg infectious disease ROS       Malignancy:      - no malignancy   Other:    - neg other ROS                 Physical Exam  Normal systems: pulmonary    Airway   Mallampati: II  TM distance: >3 FB  Neck ROM: full    Dental   (+) partials    Cardiovascular   Rhythm and rate: regular and normal      Pulmonary                        Lab / Radiology Results:   Reviewed current labs when avail, see EMR for details.      BMP:  Recent Labs   Lab Test  08/10/17   0646   NA  134   POTASSIUM  3.9    CHLORIDE  100   CO2  25   BUN  16   CR  0.98   GLC  168*   LAURA  8.6       LFTs:   Recent Labs   Lab Test  08/10/17   0646   PROTTOTAL  6.4*   ALBUMIN  3.3*   BILITOTAL  0.7   ALKPHOS  157*   AST  29   ALT  34       CBC:   Recent Labs   Lab Test  08/10/17   0646   WBC  4.2   HGB  11.4*   PLT  64*       Coags:  Recent Labs   Lab Test  07/05/17   0849   05/02/16   0639   INR  1.24*   < >  1.23*   PTT   --    --   28    < > = values in this interval not displayed.       Blood Bank:  Lab Results   Component Value Date    ABO A 12/05/2016    RH  Pos 12/05/2016    AS Neg 12/05/2016       Studies:  See EMR for current studies, reviewed when available.      Anesthesia Plan      History & Physical Review  History and physical reviewed and following examination; no interval change.    ASA Status:  3 .    NPO Status:  > 8 hours    Plan for MAC with Intravenous induction. Maintenance will be TIVA.  Reason for MAC:  Deep or markedly invasive procedure (G8)  PONV prophylaxis:  Ondansetron (or other 5HT-3)       Postoperative Care  Postoperative pain management:  Multi-modal analgesia.      Consents  Anesthetic plan, risks, benefits and alternatives discussed with:  Patient.  Use of blood products discussed: No .   .      Jason Turner MD  Anesthesiologist  8:09 AM  August 10, 2017

## 2017-08-17 NOTE — TELEPHONE ENCOUNTER
Per procedure recommendation, patient to return in 6 months for follow up EGD. Please place order for scheduling.    Natalia Kwon RN

## 2017-08-27 NOTE — ANESTHESIA PREPROCEDURE EVALUATION
Anesthesia Evaluation     . Pt has had prior anesthetic. Type: General    No history of anesthetic complications          ROS/MED HX    ENT/Pulmonary:     (+)sleep apnea, , . .    Neurologic:  - neg neurologic ROS     Cardiovascular:     (+) Dyslipidemia, hypertension----. : . . . :. .       METS/Exercise Tolerance:     Hematologic:     (+) History of Transfusion -      Musculoskeletal:   (+) arthritis, , , -       GI/Hepatic:     (+) liver disease, Other GI/Hepatic s/p liver transplant; cholangitis; h/o gi bleed; esophagitis      Renal/Genitourinary:     (+) chronic renal disease, type: CRI,       Endo:     (+) type II DM Chronic steroid usage for Post Transplant Immunosuppression .      Psychiatric:     (+) psychiatric history depression      Infectious Disease:  - neg infectious disease ROS       Malignancy:      - no malignancy   Other:    (+) H/O Chronic Pain,                   Physical Exam  Normal systems: cardiovascular and pulmonary    Airway   Mallampati: II  TM distance: >3 FB  Neck ROM: full    Dental   (+) partials, upper dentures and lower dentures    Cardiovascular       Pulmonary                     Anesthesia Plan      History & Physical Review  History and physical reviewed and following examination; no interval change.    ASA Status:  3 .    NPO Status:  > 8 hours    Plan for General and ETT with Intravenous and Propofol induction. Maintenance will be Balanced.    PONV prophylaxis:  Ondansetron (or other 5HT-3)       Postoperative Care  Postoperative pain management:  IV analgesics.      Consents  Anesthetic plan, risks, benefits and alternatives discussed with:  Patient..                          .

## 2017-08-28 NOTE — ANESTHESIA CARE TRANSFER NOTE
Patient: Miriam Magana    Procedure(s):  Endoscopic Retrograde Cholangiopancreatogram with Stone Removal and Stent Exchange - Wound Class: II-Clean Contaminated    Diagnosis: Cholangitis   Diagnosis Additional Information: No value filed.    Anesthesia Type:   General, ETT     Note:  Airway :Face Mask  Patient transferred to:PACU  Comments: VSS on arrival, report to RN.       Vitals: (Last set prior to Anesthesia Care Transfer)    CRNA VITALS  8/28/2017 0900 - 8/28/2017 0937      8/28/2017             NIBP: (!)  153/92    Pulse: 70                Electronically Signed By: BERLIN Turner CRNA  August 28, 2017  9:37 AM

## 2017-08-28 NOTE — OR NURSING
Dr. West at bedside.  Key for pt discharge. Discharge instructions given to pt and responsible adult.  All questions regarding discharge information answered.  Pt and responsible adult verbalized understanding of all discharge instruction information given.

## 2017-08-28 NOTE — DISCHARGE INSTRUCTIONS
[facility]  Same-Day Surgery   Adult Discharge Orders & Instructions     For 24 hours after surgery    1. Get plenty of rest.  A responsible adult must stay with you for at least 24 hours after you leave the hospital.   2. Do not drive or use heavy equipment.  If you have weakness or tingling, don't drive or use heavy equipment until this feeling goes away.  3. Do not drink alcohol.  4. Avoid strenuous or risky activities.  Ask for help when climbing stairs.   5. You may feel lightheaded.  IF so, sit for a few minutes before standing.  Have someone help you get up.   6. If you have nausea (feel sick to your stomach): Drink only clear liquids such as apple juice, ginger ale, broth or 7-Up.  Rest may also help.  Be sure to drink enough fluids.  Move to a regular diet as you feel able.  7. You may have a slight fever. Call the doctor if your fever is over 100 F (37.7 C) (taken under the tongue) or lasts longer than 24 hours.  8. You may have a dry mouth, a sore throat, muscle aches or trouble sleeping.  These should go away after 24 hours.  9. Do not make important or legal decisions.   Call your doctor for any of the followin.  Signs of infection (fever, growing tenderness at the surgery site, a large amount of drainage or bleeding, severe pain, foul-smelling drainage, redness, swelling).    2. It has been over 8 to 10 hours since surgery and you are still not able to urinate (pass water).    3.  Headache for over 24 hours.      To contact a doctor, call Dr West's office at [phone] (clinic) or:        [phone] and ask for the resident on call for   Gastroenterology (answered 24 hours a day)      Emergency Department:    [facility]: [phone]       (TTY for hearing impaired: [phone])

## 2017-08-28 NOTE — OR NURSING
Dr. West at bedside to let her know things went well. He wants he to take a short course of antibiotics.

## 2017-08-28 NOTE — BRIEF OP NOTE
ERCP 08/28/2017  7:58 AM [facility], [address] [place] [address] [phone]     Endoscopy Department   _______________________________________________________________________________   Patient Name: Miriam Magana           Procedure Date: 8/28/2017 7:58 AM   MRN: [id number]                       Account Number: [id number]   YOB: 1950              Admit Type: Outpatient   Age: 67                                Gender: Female   Note Status: Finalized                Attending MD: Fernando West MD   Pause for the Cause: pause for cause completed Total Sedation Time:   _______________________________________________________________________________       Procedure:           ERCP   Indications:         Post liver transplant assessment   Providers:           Fernando West MD, Tairq Navas MD, Lola Gomez RN   Patient Profile:     Ms Magana is a 68yo woman with primary biliary                        cholangitis requiring a liver transplant decades ago                        complicated by an anastomotic stenosis managed elsewhere                        (ultimately) with an uncovered metal stent. This has                        more recently been complicated by sludge and stone as                        well as pinching of the primary insertions near the apex                        of the metal stent. She underwent intrahepatic plastic                        stent placement with excellent interval course and she                        now returns for repeat management   Referring MD:        Vaibhav Ureña MD   Medicines:           General Anesthesia, Cipro 400 mg IV, Indomethacin not                        given due to contraindication   Complications:       No immediate complications.   _______________________________________________________________________________   Procedure:           Pre-Anesthesia Assessment:                         - Prior to the procedure, a History and Physical was                        performed, and patient medications and allergies were                        reviewed. The patient is competent. The risks and                        benefits of the procedure and the sedation options and                        risks were discussed with the patient. All questions                        were answered and informed consent was obtained. Patient                        identification and proposed procedure were verified by                        the nurse in the pre-procedure area. Mental Status                        Examination: alert and oriented. Airway Examination:                        Mallampati Class II (the uvula but not tonsillar pillars                        visualized). Respiratory Examination: poor air movement.                        CV Examination: systolic murmur. ASA Grade Assessment:                        III - A patient with severe systemic disease. After                        reviewing the risks and benefits, the patient was deemed                        in satisfactory condition to undergo the procedure. The                        anesthesia plan was to use general anesthesia.                        Immediately prior to administration of medications, the                        patient was re-assessed for adequacy to receive                        sedatives. The heart rate, respiratory rate, oxygen                        saturations, blood pressure, adequacy of pulmonary                        ventilation, and response to care were monitored                        throughout the procedure. The physical status of the                        patient was re-assessed after the procedure. After                        obtaining informed consent, the scope was passed under                        direct vision. Throughout the procedure, the patient's                        blood pressure, pulse, and oxygen  "saturations were                        monitored continuously. The duodenoscope was introduced                        through the mouth, and used to inject contrast into and                        used to inject contrast into the bile duct. The ERCP was                        accomplished without difficulty. The patient tolerated                        the procedure well.                                                                                     Findings:        A  film of the right upper quadrant demonstrated the metal stent        with the two plastic pig tailed stents within. Limited white light views        of the foregut confirmed stable position of the plastic biliary stents        across a widely patent biliary sphincterotomy. The metal stent was        completely within the biliary system and not across the ampulla. The        known varices were not specifically evaluted during this procedure but        may be in the future if desired. The two biliary stents were removed        from the biliary tree using a snare. The bile duct was deeply cannulated        with the 12 mm balloon in concert with multiple 0.025\" Visiglide wires.        Contrast was injected and I personally interpreted the bile duct images.        Ductal flow of contrast was adequate, image quality was excellent and        contrast extended throughout the hepatic ducts. As previous, there was        pinching of the primaries at the insertion with the UCSEM stent's        proximal end at the bifurcation. The pinching was greater on the left        than the right. The intrahepatics were somewhat altered and diffusely        mildly dilated. The common duct narrowed slightly within the UCSEM        stent, perhaps reflecting the anastomosis, though this was minimal.        There were also filling defects thought to be a stone and sludge within        the common duct. The biliary tree was swept with a 12 mm balloon        starting at " the left intrahepatic duct(s) and right intrahepatic        duct(s). Sludge and two stones were swept clear from the duct. An 8.5 Fr        by 12 cm Zimmon stent with a full external pigtail and a single internal        flap was placed 12 cm into the left intrahepatics and another 8.5 Fr by        12 cm Zimmon stent with a full external pigtail and a single internal        flap was placed 12 cm right (curling the the patient's left). Bile        flowed through the stents and the stents were in good position. The        ventral pancreatic duct and orifice were selectively not interrogated.                                                                                     Impression:          - The known varices were not specifically evaluted                        during this procedure but may be in the future if desired                        - Stable position of the in situ UCSEM stent (fully                        within the biliary system with the proximal end at the                        bifurcation leading to continued narrowing of the                        primary insertions, left > right)                        - Uncomplicated removal of the two in situ biliary                        stents which were in appropriate position and partially                        occluded                        - Successful removal of biliary stone and sludge                        (perhaps stent induced)                        - Successful replacement of 8.5F Zimmon stents, one to                        the left intrahepatics and one to the right intrahepatics   Recommendation:      - Standard outpatient general anesthesia recovery with                        probable discharge home this morning                        - Follow up with your established providers as scheduled                        - Repeat ERCP in 10 weeks for stent interrogation and                        probable exchange                        - Complete  a short course of antibiotic as prescribed                        - The findings and recommendations were discussed with                        the patient and their family                                                                                       electronically signed by MIRNA West

## 2017-08-28 NOTE — H&P
Ms Magana is a 69yo woman with PBC status post OLT duct to duct in 1995 complicated by anastomotic stricture managed elsewhere with a UCSEM stent the sequelae of which have required ERCP; she returns for the same clinically doing relatively well without complaint.    Past Medical History:   Diagnosis Date     Cirrhosis, biliary (H)     s/p Liver transplant in 1995     CKD (chronic kidney disease)      Depression      Diabetes type 2, controlled (H)      Esophagitis      Fibromyalgia      History of blood transfusion      Hypertension      Memory impairment      MALIK (obstructive sleep apnea)     CPAP     Other chronic pain     bilateral shoulder     Secondary adrenal insufficiency (H)      Urinary incontinence      Past Surgical History:   Procedure Laterality Date     C section X 2       ENDOSCOPIC RETROGRADE CHOLANGIOPANCREATOGRAM N/A 10/2/2014    Procedure: ENDOSCOPIC RETROGRADE CHOLANGIOPANCREATOGRAM;  Surgeon: Fernando West MD;  Location: [facility] OR     ENDOSCOPIC RETROGRADE CHOLANGIOPANCREATOGRAM N/A 9/14/2016    Procedure: ENDOSCOPIC RETROGRADE CHOLANGIOPANCREATOGRAM;  Surgeon: Fernando West MD;  Location: [facility] OR     ENDOSCOPIC RETROGRADE CHOLANGIOPANCREATOGRAM N/A 7/5/2017    Procedure: COMBINED ENDOSCOPIC RETROGRADE CHOLANGIOPANCREATOGRAPHY, PLACE TUBE/STENT;  Endoscopic Retrograde Cholangiopancreatogram with Biliary Stone Removal, Biliary Duct Dilation, Biliary Duct Stent Placement X2;  Surgeon: Fernando West MD;  Location: [facility] OR     ESOPHAGOSCOPY, GASTROSCOPY, DUODENOSCOPY (EGD), COMBINED N/A 10/2/2014    Procedure: COMBINED ESOPHAGOSCOPY, GASTROSCOPY, DUODENOSCOPY (EGD);  Surgeon: Fernando West MD;  Location: [facility] OR     ESOPHAGOSCOPY, GASTROSCOPY, DUODENOSCOPY (EGD), COMBINED N/A 8/10/2017    Procedure: COMBINED ESOPHAGOSCOPY, GASTROSCOPY, DUODENOSCOPY (EGD);  EGD;  Surgeon: Malu Morris MD;  Location: [facility] GI     REVERSE ARTHROPLASTY SHOULDER Left 12/6/2016     "Procedure: REVERSE ARTHROPLASTY SHOULDER;  Surgeon: Sue Holcomb MD;  Location: [facility] OR     TRANSPLANT LIVER RECIPIENT  DONOR       Current Facility-Administered Medications   Medication     lidocaine 1 % 1 mL     lidocaine (LMX4) kit     sodium chloride (PF) 0.9% PF flush 3 mL     sodium chloride (PF) 0.9% PF flush 3 mL     sodium chloride (PF) 0.9% PF flush 3 mL     indomethacin (INDOCIN) 50 MG Suppository 100 mg       /77  Temp 97.8  F (36.6  C) (Oral)  Resp 18  Ht 1.626 m (5' 4\")  Wt 92.9 kg (204 lb 12.9 oz)  SpO2 97%  BMI 35.16 kg/m2  CTAB  RRR  Soft    Plan: Repeat ERCP with stent revision/removal as appropriate    "

## 2017-08-28 NOTE — IP AVS SNAPSHOT
Post Anesthesia Care Unit [address] [address]    Phone:  [phone]                                       After Visit Summary   8/28/2017    Miriam Magana    MRN: [id number]           After Visit Summary Signature Page     I have received my discharge instructions, and my questions have been answered. I have discussed any challenges I see with this plan with the nurse or doctor.    ..........................................................................................................................................  Patient/Patient Representative Signature      ..........................................................................................................................................  Patient Representative Print Name and Relationship to Patient    ..................................................               ................................................  Date                                            Time    ..........................................................................................................................................  Reviewed by Signature/Title    ...................................................              ..............................................  Date                                                            Time

## 2017-08-28 NOTE — ANESTHESIA POSTPROCEDURE EVALUATION
Patient: Miriam Magana    Procedure(s):  Endoscopic Retrograde Cholangiopancreatogram with Stone Removal and Stent Exchange - Wound Class: II-Clean Contaminated    Diagnosis:Cholangitis   Diagnosis Additional Information: No value filed.    Anesthesia Type:  General, ETT    Note:  Anesthesia Post Evaluation    Patient location during evaluation: PACU  Patient participation: Able to fully participate in evaluation  Level of consciousness: awake and alert  Pain management: adequate  Airway patency: patent  Cardiovascular status: acceptable  Respiratory status: acceptable  Hydration status: acceptable  PONV: none     Anesthetic complications: None          Last vitals:  Vitals:    08/28/17 0945 08/28/17 0955 08/28/17 0957   BP: 146/77 (!) 165/100    Resp: 16 16    Temp:  36.6  C (97.8  F)    SpO2: 100% 95% 94%         Electronically Signed By: Harshil Reilly MD  August 28, 2017  10:11 AM

## 2017-08-28 NOTE — OR NURSING
Dr. West paged regarding no H&P in computer.  Pt stated completed 8/1.  [facility] medical records called and will fax over.

## 2017-08-28 NOTE — IP AVS SNAPSHOT
MRN:[id number]                      After Visit Summary   8/28/2017    Miriam Magana    MRN: [id number]           Thank you!     Thank you for choosing [facility] for your care. Our goal is always to provide you with excellent care. Hearing back from our patients is one way we can continue to improve our services. Please take a few minutes to complete the written survey that you may receive in the mail after you visit with us. Thank you!        Patient Information     Date Of Birth          1950        About your hospital stay     You were admitted on:  August 28, 2017 You last received care in the:  Post Anesthesia Care Unit [facility]    You were discharged on:  August 28, 2017       Who to Call     For medical emergencies, please call 911.  For non-urgent questions about your medical care, please call your primary care provider or clinic, [phone]  For questions related to your surgery, please call your surgery clinic        Attending Provider     Provider Specialty    Fernando West MD Gastroenterology       Primary Care Provider Office Phone # Fax #    Yvon Lynn [phone]      After Care Instructions     Discharge Instructions       Resume pre procedure diet            Discharge Instructions       Restart home medications.                  Your next 10 appointments already scheduled     Sep 20, 2017 11:15 AM CDT   Lab with  LAB    [facility] ([facility])    [address] [place] [id number] [phone]            Sep 20, 2017 12:15 PM CDT   (Arrive by 12:00 PM)   Return Liver Transplant with Vaibhav Ureña MD   [facility] ([facility])    [address] [place] [id number] [phone]            Dec 29, 2017 10:45 AM CST   Lab with  LAB    [facility] ([facility])    [address] [place] [address]  [place] [address]   [phone]            Dec 29, 2017 11:30 AM CST   (Arrive by 11:15 AM)   Return Liver Transplant with Vaibhav Ureña MD   [facility] ([facility])    [address] [facility]  [address] [facility] [address]   [phone]              Further instructions from your care team       [facility]  Same-Day Surgery   Adult Discharge Orders & Instructions     For 24 hours after surgery    1. Get plenty of rest.  A responsible adult must stay with you for at least 24 hours after you leave the hospital.   2. Do not drive or use heavy equipment.  If you have weakness or tingling, don't drive or use heavy equipment until this feeling goes away.  3. Do not drink alcohol.  4. Avoid strenuous or risky activities.  Ask for help when climbing stairs.   5. You may feel lightheaded.  IF so, sit for a few minutes before standing.  Have someone help you get up.   6. If you have nausea (feel sick to your stomach): Drink only clear liquids such as apple juice, ginger ale, broth or 7-Up.  Rest may also help.  Be sure to drink enough fluids.  Move to a regular diet as you feel able.  7. You may have a slight fever. Call the doctor if your fever is over 100 F (37.7 C) (taken under the tongue) or lasts longer than 24 hours.  8. You may have a dry mouth, a sore throat, muscle aches or trouble sleeping.  These should go away after 24 hours.  9. Do not make important or legal decisions.   Call your doctor for any of the followin.  Signs of infection (fever, growing tenderness at the surgery site, a large amount of drainage or bleeding, severe pain, foul-smelling drainage, redness, swelling).    2. It has been over 8 to 10 hours since surgery and you are still not able to urinate (pass water).    3.  Headache for over 24 hours.      To contact a doctor, call Dr West's office at [phone] (clinic) or:        [phone] and ask for the resident  "on call for   Gastroenterology (answered 24 hours a day)      Emergency Department:    [facility]: [phone]       (TTY for hearing impaired: [phone])              Pending Results     No orders found from 8/26/2017 to 8/29/2017.            Admission Information     Date & Time Provider Department Dept. Phone    8/28/2017 Fernando West MD Post Anesthesia Care Unit [facility] [phone]      Your Vitals Were     Blood Pressure Temperature Respirations Height Weight Pulse Oximetry    148/78 97.8  F (36.6  C) (Oral) 16 1.626 m (5' 4\") 92.9 kg (204 lb 12.9 oz) 96%    BMI (Body Mass Index)                   35.16 kg/m2           [facility] Inchart Information     [facility] gives you secure access to your electronic health record. If you see a primary care provider, you can also send messages to your care team and make appointments. If you have questions, please call your primary care clinic.  If you do not have a primary care provider, please call [phone] and they will assist you.        Care EveryWhere ID     This is your Care EveryWhere ID. This could be used by other organizations to access your [facility] medical records  [id number]        Equal Access to Services     DOUG HOBBS : Mary frias Soantoine, waaxda lufabianaadaha, qaybta kaalmada adegermányada, karrie cope. So [facility] [phone].    ATENCIÓN: Si habla español, tiene a buckner disposición servicios gratuitos de asistencia lingüística. Llame al [phone].    We comply with applicable federal civil rights laws and [place] laws. We do not discriminate on the basis of race, color, national origin, age, disability sex, sexual orientation or gender identity.               Review of your medicines      CONTINUE these medicines which may have CHANGED, or have new prescriptions. If we are uncertain of the size of tablets/capsules you have at home, strength may be listed as something that might have changed.        " Dose / Directions    * ciprofloxacin 500 MG tablet   Commonly known as:  CIPRO   This may have changed:  Another medication with the same name was added. Make sure you understand how and when to take each.   Used for:  Biliary obstruction of transplanted liver (H)        Dose:  500 mg   Take 1 tablet (500 mg) by mouth 2 times daily   Quantity:  14 tablet   Refills:  0       * ciprofloxacin 500 MG tablet   Commonly known as:  CIPRO   This may have changed:  Another medication with the same name was added. Make sure you understand how and when to take each.   Used for:  Biliary obstruction of transplanted liver (H)        Dose:  500 mg   Take 1 tablet (500 mg) by mouth 2 times daily   Quantity:  14 tablet   Refills:  0       * ciprofloxacin 500 MG tablet   Commonly known as:  CIPRO   This may have changed:  You were already taking a medication with the same name, and this prescription was added. Make sure you understand how and when to take each.   Used for:  Biliary stricture of transplanted liver (H)        Dose:  500 mg   Take 1 tablet (500 mg) by mouth 2 times daily for 5 days   Quantity:  5 tablet   Refills:  0       insulin glargine 100 UNIT/ML injection   Commonly known as:  LANTUS   This may have changed:    - how much to take  - how to take this  - when to take this  - additional instructions   Used for:  Type II diabetes mellitus (H)        Start with 11 units of Lantus at night when you go home. Check your blood sugars at least twice a day at home and if you have hypoglycemia symptoms; please adjust insulin appropriately   Quantity:  3 mL   Refills:  0       insulin lispro 100 UNIT/ML injection   Commonly known as:  HumaLOG KWIKpen   This may have changed:    - how much to take  - when to take this  - additional instructions   Used for:  Type II diabetes mellitus (H)        Use 2.5 Units/15gm carbs with meals. Sliding scale as needed:   Do Not give Correction Insulin if Pre-Meal BG < 140.  For Pre-Meal BG  140 - 189 give 1 unit.  For Pre-Meal  - 239 give 2 units.  For Pre-Meal  - 289 give 3 units.  For Pre-Meal  - 339 give 4 units.  For Pre-Meal - 399 give 5 units.  For Pre-Meal -449 give 6 units For Pre-Meal BG = or > 450 give 7 units.   Quantity:  1 Month   Refills:  0       LORazepam 1 MG tablet   Commonly known as:  ATIVAN   This may have changed:    - how much to take  - when to take this   Used for:  Depressive disorder        Dose:  0.5 mg   Take 0.5 tablets (0.5 mg) by mouth every 6 hours as needed   Quantity:  30 tablet   Refills:  0       pramipexole 0.125 MG tablet   Commonly known as:  MIRAPEX   This may have changed:  how much to take   Used for:  Insomnia        Dose:  0.125-0.25 mg   Take 1-2 tablets (0.125-0.25 mg) by mouth At Bedtime   Quantity:  90 tablet   Refills:  0       traZODone 50 MG tablet   Commonly known as:  DESYREL   This may have changed:  how much to take   Used for:  Insomnia        Dose:   mg   Take 1-2 tablets ( mg) by mouth nightly as needed for sleep   Quantity:  60 tablet   Refills:  0       * Notice:  This list has 3 medication(s) that are the same as other medications prescribed for you. Read the directions carefully, and ask your doctor or other care provider to review them with you.      CONTINUE these medicines which have NOT CHANGED        Dose / Directions    acetaminophen 500 MG tablet   Commonly known as:  TYLENOL        Dose:  1000 mg   Take 1,000 mg by mouth 2 times daily   Refills:  0       ARTIFICIAL TEARS 1.4 % ophthalmic solution   Generic drug:  polyvinyl alcohol        Dose:  1 drop   Place 1 drop into both eyes as needed   Refills:  0       atorvastatin 40 MG tablet   Commonly known as:  LIPITOR        TAKE 1 TABLET BY MOUTH ONCE DAILY.   Refills:  0       brexpiprazole 0.5 MG tablet   Commonly known as:  REXULTI        Dose:  0.5 mg   Take 0.5 mg by mouth daily (with breakfast)   Refills:  0       * buPROPion 300 MG 24  hr tablet   Commonly known as:  WELLBUTRIN XL        Dose:  1 tablet   Take 1 tablet by mouth daily   Refills:  0       * buPROPion 150 MG 24 hr tablet   Commonly known as:  WELLBUTRIN XL        Dose:  150 mg   Take 150 mg by mouth every morning with 300mg total dose of 450mg   Refills:  0       BusPIRone HCl 30 MG Tabs        Take  by mouth 2 times daily.   Refills:  0       calcium citrate-vitamin D 315-250 MG-UNIT Tabs per tablet   Commonly known as:  CITRACAL        Dose:  2 tablet   Take 2 tablets by mouth 2 times daily.   Refills:  0       cetirizine 10 MG tablet   Commonly known as:  zyrTEC        TAKE 1 TABLET BY MOUTH ONCE DAILY.   Refills:  0       ferrous gluconate 324 (38 FE) MG tablet   Commonly known as:  FERGON   Used for:  Liver replaced by transplant (H)        Dose:  1 tablet   Take 1 tablet (324 mg) by mouth 3 times daily (with meals)   Quantity:  90 tablet   Refills:  1       fluticasone 50 MCG/ACT spray   Commonly known as:  FLONASE        Dose:  1 spray   Spray 1 spray into both nostrils 2 times daily   Refills:  0       furosemide 40 MG tablet   Commonly known as:  LASIX   Used for:  Other ascites        Dose:  40 mg   Take 1 tablet (40 mg) by mouth daily   Quantity:  90 tablet   Refills:  3       * gabapentin 300 MG capsule   Commonly known as:  NEURONTIN   Used for:  Shoulder arthritis        TAKE (1) CAPSULE BY MOUTH THREE TIMES DAILY.   Quantity:  90 capsule   Refills:  0       * gabapentin 300 MG capsule   Commonly known as:  NEURONTIN   Used for:  Shoulder arthritis        Dose:  300 mg   Take 1 capsule (300 mg) by mouth 3 times daily   Quantity:  90 capsule   Refills:  0       metFORMIN 500 MG tablet   Commonly known as:  GLUCOPHAGE        750 mg at HS   Refills:  0       multivitamin, therapeutic with minerals Tabs tablet        Dose:  1 tablet   Take 1 tablet by mouth daily   Refills:  0       mycophenolate tablet   Used for:  Status post liver transplantation (H)        Dose:  1000  mg   Take 2 tablets (1,000 mg) by mouth 2 times daily   Quantity:  60 tablet   Refills:  11       * omeprazole 20 MG CR capsule   Commonly known as:  priLOSEC   Used for:  Gastroesophageal reflux disease without esophagitis        Dose:  20 mg   Take 1 capsule (20 mg) by mouth daily   Quantity:  90 capsule   Refills:  3       * omeprazole 20 MG tablet   Used for:  Idiopathic esophageal varices without bleeding (H)        Dose:  20 mg   Take 1 tablet (20 mg) by mouth 2 times daily   Quantity:  14 tablet   Refills:  0       predniSONE 5 MG tablet   Commonly known as:  DELTASONE   Used for:  Liver replaced by transplant (H)        Dose:  5 mg   Take 1 tablet (5 mg) by mouth daily   Quantity:  30 tablet   Refills:  11       propranolol 40 MG tablet   Commonly known as:  INDERAL   Used for:  Essential and other specified forms of tremor        Dose:  40 mg   Take 1 tablet (40 mg) by mouth 2 times daily   Quantity:  60 tablet   Refills:  0       senna-docusate 8.6-50 MG per tablet   Commonly known as:  SENOKOT-S;PERICOLACE   Used for:  Osteoarthritis of left glenohumeral joint        Dose:  1-2 tablet   Take 1-2 tablets by mouth 2 times daily as needed for constipation (while taking narcotic pain medications)   Quantity:  60 tablet   Refills:  1       sertraline 100 MG tablet   Commonly known as:  ZOLOFT        Dose:  200 mg   Take 200 mg by mouth daily AM   Refills:  0       TORSEMIDE PO        Dose:  80 mg   Take 80 mg by mouth daily   Refills:  0       ursodiol 300 MG capsule   Commonly known as:  ACTIGALL   Used for:  Liver replaced by transplant (H)        Take 2 caps in a.m. And 1 cap in p.m.   Quantity:  270 capsule   Refills:  3       vitamin D 2000 UNITS tablet        Dose:  2 tablet   Take 2 tablets by mouth daily AM   Refills:  0       * Notice:  This list has 6 medication(s) that are the same as other medications prescribed for you. Read the directions carefully, and ask your doctor or other care provider to  review them with you.         Where to get your medicines      These medications were sent to [facility] - [place], [place] - [address]  [address] [address]     Phone:  [phone]     ciprofloxacin 500 MG tablet                Protect others around you: Learn how to safely use, store and throw away your medicines at www.disposemymeds.org.             Medication List: This is a list of all your medications and when to take them. Check marks below indicate your daily home schedule. Keep this list as a reference.      Medications           Morning Afternoon Evening Bedtime As Needed    acetaminophen 500 MG tablet   Commonly known as:  TYLENOL   Take 1,000 mg by mouth 2 times daily                                ARTIFICIAL TEARS 1.4 % ophthalmic solution   Place 1 drop into both eyes as needed   Generic drug:  polyvinyl alcohol                                atorvastatin 40 MG tablet   Commonly known as:  LIPITOR   TAKE 1 TABLET BY MOUTH ONCE DAILY.                                brexpiprazole 0.5 MG tablet   Commonly known as:  REXULTI   Take 0.5 mg by mouth daily (with breakfast)                                * buPROPion 300 MG 24 hr tablet   Commonly known as:  WELLBUTRIN XL   Take 1 tablet by mouth daily                                * buPROPion 150 MG 24 hr tablet   Commonly known as:  WELLBUTRIN XL   Take 150 mg by mouth every morning with 300mg total dose of 450mg                                BusPIRone HCl 30 MG Tabs   Take  by mouth 2 times daily.                                calcium citrate-vitamin D 315-250 MG-UNIT Tabs per tablet   Commonly known as:  CITRACAL   Take 2 tablets by mouth 2 times daily.                                cetirizine 10 MG tablet   Commonly known as:  zyrTEC   TAKE 1 TABLET BY MOUTH ONCE DAILY.                                * ciprofloxacin 500 MG tablet   Commonly known as:  CIPRO   Take 1 tablet (500 mg) by mouth 2 times daily                                 * ciprofloxacin 500 MG tablet   Commonly known as:  CIPRO   Take 1 tablet (500 mg) by mouth 2 times daily                                * ciprofloxacin 500 MG tablet   Commonly known as:  CIPRO   Take 1 tablet (500 mg) by mouth 2 times daily for 5 days                                ferrous gluconate 324 (38 FE) MG tablet   Commonly known as:  FERGON   Take 1 tablet (324 mg) by mouth 3 times daily (with meals)                                fluticasone 50 MCG/ACT spray   Commonly known as:  FLONASE   Spray 1 spray into both nostrils 2 times daily                                furosemide 40 MG tablet   Commonly known as:  LASIX   Take 1 tablet (40 mg) by mouth daily                                * gabapentin 300 MG capsule   Commonly known as:  NEURONTIN   TAKE (1) CAPSULE BY MOUTH THREE TIMES DAILY.                                * gabapentin 300 MG capsule   Commonly known as:  NEURONTIN   Take 1 capsule (300 mg) by mouth 3 times daily                                insulin glargine 100 UNIT/ML injection   Commonly known as:  LANTUS   Start with 11 units of Lantus at night when you go home. Check your blood sugars at least twice a day at home and if you have hypoglycemia symptoms; please adjust insulin appropriately                                insulin lispro 100 UNIT/ML injection   Commonly known as:  HumaLOG KWIKpen   Use 2.5 Units/15gm carbs with meals. Sliding scale as needed:   Do Not give Correction Insulin if Pre-Meal BG < 140.  For Pre-Meal  - 189 give 1 unit.  For Pre-Meal  - 239 give 2 units.  For Pre-Meal  - 289 give 3 units.  For Pre-Meal  - 339 give 4 units.  For Pre-Meal - 399 give 5 units.  For Pre-Meal -449 give 6 units For Pre-Meal BG = or > 450 give 7 units.                                LORazepam 1 MG tablet   Commonly known as:  ATIVAN   Take 0.5 tablets (0.5 mg) by mouth every 6 hours as needed                                 metFORMIN 500 MG tablet   Commonly known as:  GLUCOPHAGE   750 mg at HS                                multivitamin, therapeutic with minerals Tabs tablet   Take 1 tablet by mouth daily                                mycophenolate tablet   Take 2 tablets (1,000 mg) by mouth 2 times daily                                * omeprazole 20 MG CR capsule   Commonly known as:  priLOSEC   Take 1 capsule (20 mg) by mouth daily                                * omeprazole 20 MG tablet   Take 1 tablet (20 mg) by mouth 2 times daily                                pramipexole 0.125 MG tablet   Commonly known as:  MIRAPEX   Take 1-2 tablets (0.125-0.25 mg) by mouth At Bedtime                                predniSONE 5 MG tablet   Commonly known as:  DELTASONE   Take 1 tablet (5 mg) by mouth daily                                propranolol 40 MG tablet   Commonly known as:  INDERAL   Take 1 tablet (40 mg) by mouth 2 times daily                                senna-docusate 8.6-50 MG per tablet   Commonly known as:  SENOKOT-S;PERICOLACE   Take 1-2 tablets by mouth 2 times daily as needed for constipation (while taking narcotic pain medications)                                sertraline 100 MG tablet   Commonly known as:  ZOLOFT   Take 200 mg by mouth daily AM                                TORSEMIDE PO   Take 80 mg by mouth daily                                traZODone 50 MG tablet   Commonly known as:  DESYREL   Take 1-2 tablets ( mg) by mouth nightly as needed for sleep                                ursodiol 300 MG capsule   Commonly known as:  ACTIGALL   Take 2 caps in a.m. And 1 cap in p.m.                                vitamin D 2000 UNITS tablet   Take 2 tablets by mouth daily AM                                * Notice:  This list has 9 medication(s) that are the same as other medications prescribed for you. Read the directions carefully, and ask your doctor or other care provider to review them with you.

## 2017-08-30 NOTE — PROGRESS NOTES
Post ERCP (8/28/2017) with Dr. West: Follow-up    Post procedure recommendations: Follow up with your established providers as scheduled - Repeat ERCP in 10 weeks for stent interrogation and probable exchange     Message left for her to call with any questions/concerns.     Orders placed: ERCP and sent to OR scheduling.     Contact information verified for future questions/concerns.    Mireille HERNANDEZ RN Coordinator  Dr. Gallardo, Dr. West & Dr. Ribera  [facility]  [phone]

## 2017-09-14 NOTE — TELEPHONE ENCOUNTER
Left message for pt reminding them of upcoming appointment.  Instructed pt to bring updated medications list.  Instructed pt to arrive an hour and a half to two hours prior to appt time for labs.  Justin Kauffman, CMA

## 2017-09-20 NOTE — MR AVS SNAPSHOT
After Visit Summary   9/20/2017    Miriam Magana    MRN: [id number]           Patient Information     Date Of Birth          1950        Visit Information        Provider Department      9/20/2017 12:15 PM Vaibhav Ureña MD [facility] Hepatology        Today's Diagnoses     Liver transplanted (H)    -  1       Follow-ups after your visit        Follow-up notes from your care team     Return in about 6 months (around 3/20/2018).      Your next 10 appointments already scheduled     Dec 29, 2017 10:45 AM CST   Lab with  LAB   [facility] ([facility])    [address]  1st Floor  [place] [facility] [place] [address]   [phone]            Dec 29, 2017 11:30 AM CST   (Arrive by 11:15 AM)   Return Liver Transplant with Vaibhav Ureña MD   [facility] Hepat[facility] ([facility])    [address]  3rd Floor  [place] [id number] [phone]            Feb 08, 2018   Procedure with Malu Morris MD   [facility] [place], Endoscopy ([facility] [place])    [address] [address] [phone]           The [facility] is located on the corner of [address] and [address] on the [facility] [address] [facility]. It is easily accessible from virtually any point in the [place] [place] area, via [id number] and [id number].              Who to contact     If you have questions or need follow up information about today's clinic visit or your schedule please contact [facility] HEPATOLOG[facility] directly at [phone].  Normal or non-critical lab and imaging results will be communicated to you by MyChart, letter or phone within 4 business days after the clinic has received the results. If you do not hear from us within 7 days, please contact the clinic through MyChart or phone. If you have a critical or abnormal lab result, we will notify you by phone as soon as possible.  Submit refill requests  "through ngmoco or call your pharmacy and they will forward the refill request to us. Please allow 3 business days for your refill to be completed.          Additional Information About Your Visit        [employer] Information     ngmoco gives you secure access to your electronic health record. If you see a primary care provider, you can also send messages to your care team and make appointments. If you have questions, please call your primary care clinic.  If you do not have a primary care provider, please call [phone] and they will assist you.        Care EveryWhere ID     This is your Care EveryWhere ID. This could be used by other organizations to access your [facility] medical records  [id number]        Your Vitals Were     Pulse Temperature Respirations Height Pulse Oximetry BMI (Body Mass Index)    77 98.5  F (36.9  C) (Oral) 16 1.626 m (5' 4.02\") 98% 33.45 kg/m2       Blood Pressure from Last 3 Encounters:   09/20/17 148/89   08/28/17 116/78   08/10/17 118/50    Weight from Last 3 Encounters:   09/20/17 88.5 kg (195 lb)   08/28/17 92.9 kg (204 lb 12.9 oz)   08/10/17 90.8 kg (200 lb 1.6 oz)              Today, you had the following     No orders found for display         Today's Medication Changes          These changes are accurate as of: 9/20/17 11:59 PM.  If you have any questions, ask your nurse or doctor.               These medicines have changed or have updated prescriptions.        Dose/Directions    furosemide 40 MG tablet   Commonly known as:  LASIX   This may have changed:  how much to take   Used for:  Other ascites        Dose:  40 mg   Take 1 tablet (40 mg) by mouth daily   Quantity:  90 tablet   Refills:  3       insulin glargine 100 UNIT/ML injection   Commonly known as:  LANTUS   This may have changed:    - how much to take  - how to take this  - when to take this  - additional instructions   Used for:  Type II diabetes mellitus (H)        Start with 11 units of Lantus at night when you go " home. Check your blood sugars at least twice a day at home and if you have hypoglycemia symptoms; please adjust insulin appropriately   Quantity:  3 mL   Refills:  0       insulin lispro 100 UNIT/ML injection   Commonly known as:  HumaLOG KWDANIELpen   This may have changed:    - how much to take  - when to take this  - additional instructions   Used for:  Type II diabetes mellitus (H)        Use 2.5 Units/15gm carbs with meals. Sliding scale as needed:   Do Not give Correction Insulin if Pre-Meal BG < 140.  For Pre-Meal  - 189 give 1 unit.  For Pre-Meal  - 239 give 2 units.  For Pre-Meal  - 289 give 3 units.  For Pre-Meal  - 339 give 4 units.  For Pre-Meal - 399 give 5 units.  For Pre-Meal -449 give 6 units For Pre-Meal BG = or > 450 give 7 units.   Quantity:  1 Month   Refills:  0       LORazepam 1 MG tablet   Commonly known as:  ATIVAN   This may have changed:    - how much to take  - when to take this   Used for:  Depressive disorder        Dose:  0.5 mg   Take 0.5 tablets (0.5 mg) by mouth every 6 hours as needed   Quantity:  30 tablet   Refills:  0       pramipexole 0.125 MG tablet   Commonly known as:  MIRAPEX   This may have changed:  how much to take   Used for:  Insomnia        Dose:  0.125-0.25 mg   Take 1-2 tablets (0.125-0.25 mg) by mouth At Bedtime   Quantity:  90 tablet   Refills:  0       traZODone 50 MG tablet   Commonly known as:  DESYREL   This may have changed:  how much to take   Used for:  Insomnia        Dose:   mg   Take 1-2 tablets ( mg) by mouth nightly as needed for sleep   Quantity:  60 tablet   Refills:  0                Primary Care Provider Office Phone # Fax #    Yvon Lynn [phone]       [place] [address]        Equal Access to Services     DOUG HOBBS AH: Mary Florence, wadejuan luqshelby, qaybta kaalmadaniel gonsales, karrie cope. So [facility]  [phone].    ATENCIÓN: Si lauren nguyen, tiene a buckner disposición servicios gratuitos de asistencia lingüística. Brian hoskins [phone].    We comply with applicable federal civil rights laws and [place] laws. We do not discriminate on the basis of race, color, national origin, age, disability sex, sexual orientation or gender identity.            Thank you!     Thank you for choosing [facility] HEPATOLOG[facility]  for your care. Our goal is always to provide you with excellent care. Hearing back from our patients is one way we can continue to improve our services. Please take a few minutes to complete the written survey that you may receive in the mail after your visit with us. Thank you!             Your Updated Medication List - Protect others around you: Learn how to safely use, store and throw away your medicines at www.disposemymeds.org.          This list is accurate as of: 9/20/17 11:59 PM.  Always use your most recent med list.                   Brand Name Dispense Instructions for use Diagnosis    acetaminophen 500 MG tablet    TYLENOL     Take 1,000 mg by mouth 2 times daily as needed for mild pain or fever        ARTIFICIAL TEARS 1.4 % ophthalmic solution   Generic drug:  polyvinyl alcohol      Place 1 drop into both eyes as needed        atorvastatin 40 MG tablet    LIPITOR     TAKE 1 TABLET BY MOUTH ONCE DAILY.        brexpiprazole 0.5 MG tablet    REXULTI     Take 1 mg by mouth daily (with breakfast)        * buPROPion 300 MG 24 hr tablet    WELLBUTRIN XL     Take 450 mg by mouth daily        * buPROPion 150 MG 24 hr tablet    WELLBUTRIN XL     Take 150 mg by mouth every morning with 300mg total dose of 450mg        BusPIRone HCl 30 MG Tabs      Take  by mouth 2 times daily.        calcium citrate-vitamin D 315-250 MG-UNIT Tabs per tablet    CITRACAL     Take 2 tablets by mouth 2 times daily.        cephALEXin 500 MG capsule    KEFLEX     Take 500 mg by mouth 3 times daily        cetirizine 10 MG tablet     zyrTEC     TAKE 1 TABLET BY MOUTH ONCE DAILY.        ferrous gluconate 324 (38 FE) MG tablet    FERGON    90 tablet    Take 1 tablet (324 mg) by mouth 3 times daily (with meals)    Liver replaced by transplant (H)       fluticasone 50 MCG/ACT spray    FLONASE     Spray 1 spray into both nostrils 2 times daily as needed for allergies        furosemide 40 MG tablet    LASIX    90 tablet    Take 1 tablet (40 mg) by mouth daily    Other ascites       gabapentin 300 MG capsule    NEURONTIN    90 capsule    Take 1 capsule (300 mg) by mouth 3 times daily    Shoulder arthritis       insulin glargine 100 UNIT/ML injection    LANTUS    3 mL    Start with 11 units of Lantus at night when you go home. Check your blood sugars at least twice a day at home and if you have hypoglycemia symptoms; please adjust insulin appropriately    Type II diabetes mellitus (H)       insulin lispro 100 UNIT/ML injection    HumaLOG KWIKpen    1 Month    Use 2.5 Units/15gm carbs with meals. Sliding scale as needed:   Do Not give Correction Insulin if Pre-Meal BG < 140.  For Pre-Meal  - 189 give 1 unit.  For Pre-Meal  - 239 give 2 units.  For Pre-Meal  - 289 give 3 units.  For Pre-Meal  - 339 give 4 units.  For Pre-Meal - 399 give 5 units.  For Pre-Meal -449 give 6 units For Pre-Meal BG = or > 450 give 7 units.    Type II diabetes mellitus (H)       LORazepam 1 MG tablet    ATIVAN    30 tablet    Take 0.5 tablets (0.5 mg) by mouth every 6 hours as needed    Depressive disorder       metFORMIN 500 MG tablet    GLUCOPHAGE     750 mg at HS        multivitamin, therapeutic with minerals Tabs tablet      Take 1 tablet by mouth daily        mycophenolate tablet     60 tablet    Take 2 tablets (1,000 mg) by mouth 2 times daily    Status post liver transplantation (H)       omeprazole 20 MG CR capsule    priLOSEC    90 capsule    Take 1 capsule (20 mg) by mouth daily    Gastroesophageal reflux disease without esophagitis        pramipexole 0.125 MG tablet    MIRAPEX    90 tablet    Take 1-2 tablets (0.125-0.25 mg) by mouth At Bedtime    Insomnia       predniSONE 5 MG tablet    DELTASONE    30 tablet    Take 1 tablet (5 mg) by mouth daily    Liver replaced by transplant (H)       propranolol 40 MG tablet    INDERAL    60 tablet    Take 1 tablet (40 mg) by mouth 2 times daily    Essential and other specified forms of tremor       senna-docusate 8.6-50 MG per tablet    SENOKOT-S;PERICOLACE    60 tablet    Take 1-2 tablets by mouth 2 times daily as needed for constipation (while taking narcotic pain medications)    Osteoarthritis of left glenohumeral joint       sertraline 100 MG tablet    ZOLOFT     Take 200 mg by mouth daily AM        spironolactone 25 MG tablet    ALDACTONE     Take 25 mg by mouth daily        traZODone 50 MG tablet    DESYREL    60 tablet    Take 1-2 tablets ( mg) by mouth nightly as needed for sleep    Insomnia       ursodiol 300 MG capsule    ACTIGALL    270 capsule    Take 2 caps in a.m. And 1 cap in p.m.    Liver replaced by transplant (H)       vitamin D 2000 UNITS tablet      Take 2 tablets by mouth daily AM        * Notice:  This list has 2 medication(s) that are the same as other medications prescribed for you. Read the directions carefully, and ask your doctor or other care provider to review them with you.

## 2017-09-20 NOTE — NURSING NOTE
"Chief Complaint   Patient presents with     RECHECK     S/P Liver TX 2/21/1995       Initial /89 (BP Location: Right arm, Patient Position: Sitting, Cuff Size: Adult Regular)  Pulse 77  Temp 98.5  F (36.9  C) (Oral)  Resp 16  Ht 1.626 m (5' 4.02\")  Wt 88.5 kg (195 lb)  SpO2 98%  BMI 33.45 kg/m2 Estimated body mass index is 33.45 kg/(m^2) as calculated from the following:    Height as of this encounter: 1.626 m (5' 4.02\").    Weight as of this encounter: 88.5 kg (195 lb).  Medication Reconciliation: complete     Magalie Shay CMA  9/20/2017 11:58 AM        "

## 2017-09-20 NOTE — PROGRESS NOTES
I had the pleasure of seeing Miriam Magana for followup in the Liver Transplant Clinic at the [facility] on 09/20/2017.  Ms. Magana returns now almost 23 years status post liver transplantation.        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  Almost 19 years ago she had a metal stent put in her biliary tract for stricture disease which of course since it was not covered stent.  It is set up a scenario where she has recurrent bouts of cholangitis and is requiring ERCPs every 2-3 months to keep the biliary system clean.  The last one was 3 weeks ago by Dr. West.        Her major complaint at this visit is fatigue.  She commonly sleeps most of the day and does report that she sleeps fairly well at night.  She denies any itching or skin rash.  She denies any abdominal pain.  She has not had any fevers or chills, cough or shortness of breath.  She also complains of some nausea without vomiting.  Her weight is down 5 pounds since she was last seen 6 months ago.  She denies any increased abdominal girth or lower extremity edema.  She has not had any gastrointestinal bleeding.     Current Outpatient Prescriptions   Medication     spironolactone (ALDACTONE) 25 MG tablet     cephALEXin (KEFLEX) 500 MG capsule     gabapentin (NEURONTIN) 300 MG capsule     ferrous gluconate (FERGON) 324 (38 FE) MG tablet     predniSONE (DELTASONE) 5 MG tablet     furosemide (LASIX) 40 MG tablet     ursodiol (ACTIGALL) 300 MG capsule     omeprazole (PRILOSEC) 20 MG CR capsule     senna-docusate (SENOKOT-S;PERICOLACE) 8.6-50 MG per tablet     brexpiprazole (REXULTI) 0.5 MG tablet     fluticasone (FLONASE) 50 MCG/ACT spray     CELLCEPT 500 MG PO TABLET     multivitamin, therapeutic with minerals (THERA-VIT-M) TABS     acetaminophen (TYLENOL) 500 MG tablet     atorvastatin (LIPITOR) 40 MG tablet     cetirizine (ZYRTEC) 10 MG tablet     metFORMIN (GLUCOPHAGE) 500 MG tablet     buPROPion (WELLBUTRIN  XL) 150 MG 24 hr tablet     sertraline (ZOLOFT) 100 MG tablet     LORazepam (ATIVAN) 1 MG tablet     traZODone (DESYREL) 50 MG tablet     insulin glargine (LANTUS) 100 UNIT/ML PEN     pramipexole (MIRAPEX) 0.125 MG tablet     propranolol (INDERAL) 40 MG tablet     insulin lispro (HUMALOG KWIKPEN) 100 UNIT/ML soln     buPROPion (WELLBUTRIN XL) 300 MG 24 hr tablet     polyvinyl alcohol (ARTIFICIAL TEARS) 1.4 % ophthalmic solution     BusPIRone HCl 30 MG TABS     calcium citrate-vitamin D (CITRACAL) 315-250 MG-UNIT TABS     Cholecalciferol (VITAMIN D) 2000 UNIT tablet     [DISCONTINUED] gabapentin (NEURONTIN) 300 MG capsule     No current facility-administered medications for this visit.      B/P: 148/89, T: 98.5, P: 77, R: 16    HEENT exam shows no scleral icterus and no temporal muscle wasting.  Chest is clear.  Abdominal exam shows no increase in girth.  No masses or tenderness to palpation are present.  Liver is 10 cm in span without left lobe enlargement.  No spleen tip is palpable, and extremity exam shows no edema.  Skin exam shows no stigmata of chronic liver disease or chronic pruritus.  Neurologic exam is nonfocal.     Recent Results (from the past 168 hour(s))   CBC with platelets    Collection Time: 09/20/17 11:00 AM   Result Value Ref Range    WBC 3.3 (L) 4.0 - 11.0 10e9/L    RBC Count 4.26 3.8 - 5.2 10e12/L    Hemoglobin 11.7 11.7 - 15.7 g/dL    Hematocrit 36.4 35.0 - 47.0 %    MCV 85 78 - 100 fl    MCH 27.5 26.5 - 33.0 pg    MCHC 32.1 31.5 - 36.5 g/dL    RDW 15.9 (H) 10.0 - 15.0 %    Platelet Count 70 (L) 150 - 450 10e9/L   Basic metabolic panel    Collection Time: 09/20/17 11:00 AM   Result Value Ref Range    Sodium 135 133 - 144 mmol/L    Potassium 4.0 3.4 - 5.3 mmol/L    Chloride 101 94 - 109 mmol/L    Carbon Dioxide 26 20 - 32 mmol/L    Anion Gap 9 3 - 14 mmol/L    Glucose 203 (H) 70 - 99 mg/dL    Urea Nitrogen 22 7 - 30 mg/dL    Creatinine 0.99 0.52 - 1.04 mg/dL    GFR Estimate 56 (L) >60  mL/min/1.7m2    GFR Estimate If Black 68 >60 mL/min/1.7m2    Calcium 9.5 8.5 - 10.1 mg/dL   Hepatic panel    Collection Time: 09/20/17 11:00 AM   Result Value Ref Range    Bilirubin Direct 0.9 (H) 0.0 - 0.2 mg/dL    Bilirubin Total 1.4 (H) 0.2 - 1.3 mg/dL    Albumin 3.4 3.4 - 5.0 g/dL    Protein Total 6.8 6.8 - 8.8 g/dL    Alkaline Phosphatase 266 (H) 40 - 150 U/L    ALT 48 0 - 50 U/L    AST 44 0 - 45 U/L      IMPRESSION:  Ms. Magana is 22-1/2 years status post liver transplantation.  Her biliary tract disease seems to be fairly well managed with recurrent repeated ERCPs with stent removal and replacement.  Certainly it has kept her out of the hospital for the most part.      She also does have diabetes and her last hemoglobin A1C of 6.3.  She does carry a diagnosis of chronic kidney disease, but kidney function is excellent at this time with a creatinine less than 1.  She is up-to-date with regard to vaccines and all cancer screening.      She does occasionally have some loose stools and I have advised her that she can take Imodium intermittently.  They do not wake her at night and it does not sound as if it represents intrinsic bowel disease.  She is on MMF.      My plan will be to see her back in the clinic in 6 months.      Thank you very much for allowing me to participate in the care of this patient.  If you have any questions regarding my recommendations, please do not hesitate to contact me.       Vaibhav Ureña MD      Professor of Medicine  [employer]      Executive Medical Director, Solid Organ Transplant Program  [facility]

## 2017-09-20 NOTE — LETTER
9/20/2017      RE: Miriam Magana  [id number]  [id number]  [facility] [address]       I had the pleasure of seeing Miriam Magana for followup in the Liver Transplant Clinic at the [facility] on 09/20/2017.  Ms. Magana returns now almost 23 years status post liver transplantation.        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  Almost 19 years ago she had a metal stent put in her biliary tract for stricture disease which of course since it was not covered stent.  It is set up a scenario where she has recurrent bouts of cholangitis and is requiring ERCPs every 2-3 months to keep the biliary system clean.  The last one was 3 weeks ago by Dr. West.        Her major complaint at this visit is fatigue.  She commonly sleeps most of the day and does report that she sleeps fairly well at night.  She denies any itching or skin rash.  She denies any abdominal pain.  She has not had any fevers or chills, cough or shortness of breath.  She also complains of some nausea without vomiting.  Her weight is down 5 pounds since she was last seen 6 months ago.  She denies any increased abdominal girth or lower extremity edema.  She has not had any gastrointestinal bleeding.     Current Outpatient Prescriptions   Medication     spironolactone (ALDACTONE) 25 MG tablet     cephALEXin (KEFLEX) 500 MG capsule     gabapentin (NEURONTIN) 300 MG capsule     ferrous gluconate (FERGON) 324 (38 FE) MG tablet     predniSONE (DELTASONE) 5 MG tablet     furosemide (LASIX) 40 MG tablet     ursodiol (ACTIGALL) 300 MG capsule     omeprazole (PRILOSEC) 20 MG CR capsule     senna-docusate (SENOKOT-S;PERICOLACE) 8.6-50 MG per tablet     brexpiprazole (REXULTI) 0.5 MG tablet     fluticasone (FLONASE) 50 MCG/ACT spray     CELLCEPT 500 MG PO TABLET     multivitamin, therapeutic with minerals (THERA-VIT-M) TABS     acetaminophen (TYLENOL) 500 MG tablet     atorvastatin (LIPITOR) 40 MG tablet     cetirizine (ZYRTEC) 10  MG tablet     metFORMIN (GLUCOPHAGE) 500 MG tablet     buPROPion (WELLBUTRIN XL) 150 MG 24 hr tablet     sertraline (ZOLOFT) 100 MG tablet     LORazepam (ATIVAN) 1 MG tablet     traZODone (DESYREL) 50 MG tablet     insulin glargine (LANTUS) 100 UNIT/ML PEN     pramipexole (MIRAPEX) 0.125 MG tablet     propranolol (INDERAL) 40 MG tablet     insulin lispro (HUMALOG KWIKPEN) 100 UNIT/ML soln     buPROPion (WELLBUTRIN XL) 300 MG 24 hr tablet     polyvinyl alcohol (ARTIFICIAL TEARS) 1.4 % ophthalmic solution     BusPIRone HCl 30 MG TABS     calcium citrate-vitamin D (CITRACAL) 315-250 MG-UNIT TABS     Cholecalciferol (VITAMIN D) 2000 UNIT tablet     [DISCONTINUED] gabapentin (NEURONTIN) 300 MG capsule     No current facility-administered medications for this visit.      B/P: 148/89, T: 98.5, P: 77, R: 16    HEENT exam shows no scleral icterus and no temporal muscle wasting.  Chest is clear.  Abdominal exam shows no increase in girth.  No masses or tenderness to palpation are present.  Liver is 10 cm in span without left lobe enlargement.  No spleen tip is palpable, and extremity exam shows no edema.  Skin exam shows no stigmata of chronic liver disease or chronic pruritus.  Neurologic exam is nonfocal.     Recent Results (from the past 168 hour(s))   CBC with platelets    Collection Time: 09/20/17 11:00 AM   Result Value Ref Range    WBC 3.3 (L) 4.0 - 11.0 10e9/L    RBC Count 4.26 3.8 - 5.2 10e12/L    Hemoglobin 11.7 11.7 - 15.7 g/dL    Hematocrit 36.4 35.0 - 47.0 %    MCV 85 78 - 100 fl    MCH 27.5 26.5 - 33.0 pg    MCHC 32.1 31.5 - 36.5 g/dL    RDW 15.9 (H) 10.0 - 15.0 %    Platelet Count 70 (L) 150 - 450 10e9/L   Basic metabolic panel    Collection Time: 09/20/17 11:00 AM   Result Value Ref Range    Sodium 135 133 - 144 mmol/L    Potassium 4.0 3.4 - 5.3 mmol/L    Chloride 101 94 - 109 mmol/L    Carbon Dioxide 26 20 - 32 mmol/L    Anion Gap 9 3 - 14 mmol/L    Glucose 203 (H) 70 - 99 mg/dL    Urea Nitrogen 22 7 - 30  mg/dL    Creatinine 0.99 0.52 - 1.04 mg/dL    GFR Estimate 56 (L) >60 mL/min/1.7m2    GFR Estimate If Black 68 >60 mL/min/1.7m2    Calcium 9.5 8.5 - 10.1 mg/dL   Hepatic panel    Collection Time: 09/20/17 11:00 AM   Result Value Ref Range    Bilirubin Direct 0.9 (H) 0.0 - 0.2 mg/dL    Bilirubin Total 1.4 (H) 0.2 - 1.3 mg/dL    Albumin 3.4 3.4 - 5.0 g/dL    Protein Total 6.8 6.8 - 8.8 g/dL    Alkaline Phosphatase 266 (H) 40 - 150 U/L    ALT 48 0 - 50 U/L    AST 44 0 - 45 U/L      IMPRESSION:  Ms. Magana is 22-1/2 years status post liver transplantation.  Her biliary tract disease seems to be fairly well managed with recurrent repeated ERCPs with stent removal and replacement.  Certainly it has kept her out of the hospital for the most part.      She also does have diabetes and her last hemoglobin A1C of 6.3.  She does carry a diagnosis of chronic kidney disease, but kidney function is excellent at this time with a creatinine less than 1.  She is up-to-date with regard to vaccines and all cancer screening.      She does occasionally have some loose stools and I have advised her that she can take Imodium intermittently.  They do not wake her at night and it does not sound as if it represents intrinsic bowel disease.  She is on MMF.      My plan will be to see her back in the clinic in 6 months.      Thank you very much for allowing me to participate in the care of this patient.  If you have any questions regarding my recommendations, please do not hesitate to contact me.       Vaibhav Ureña MD      Professor of Medicine  [employer]      Executive Medical Director, Solid Organ Transplant Program  [facility]

## 2018-01-01 ENCOUNTER — TELEPHONE (OUTPATIENT)
Dept: INTERVENTIONAL RADIOLOGY/VASCULAR | Facility: CLINIC | Age: 68
End: 2018-01-01

## 2018-01-01 ENCOUNTER — TELEPHONE (OUTPATIENT)
Dept: TRANSPLANT | Facility: CLINIC | Age: 68
End: 2018-01-01

## 2018-01-01 ENCOUNTER — OFFICE VISIT (OUTPATIENT)
Dept: INTERVENTIONAL RADIOLOGY/VASCULAR | Facility: CLINIC | Age: 68
End: 2018-01-01
Payer: MEDICARE

## 2018-01-01 ENCOUNTER — OFFICE VISIT (OUTPATIENT)
Dept: ORTHOPEDICS | Facility: CLINIC | Age: 68
End: 2018-01-01
Payer: MEDICARE

## 2018-01-01 ENCOUNTER — TELEPHONE (OUTPATIENT)
Dept: ORTHOPEDICS | Facility: CLINIC | Age: 68
End: 2018-01-01

## 2018-01-01 ENCOUNTER — HOSPITAL ENCOUNTER (INPATIENT)
Facility: CLINIC | Age: 68
Setting detail: SURGERY ADMIT
End: 2018-01-01
Payer: MEDICARE

## 2018-01-01 ENCOUNTER — RADIANT APPOINTMENT (OUTPATIENT)
Dept: ULTRASOUND IMAGING | Facility: CLINIC | Age: 68
End: 2018-01-01
Attending: RADIOLOGY
Payer: MEDICARE

## 2018-01-01 ENCOUNTER — TELEPHONE (OUTPATIENT)
Dept: GASTROENTEROLOGY | Facility: CLINIC | Age: 68
End: 2018-01-01

## 2018-01-01 ENCOUNTER — ANESTHESIA EVENT (OUTPATIENT)
Dept: SURGERY | Facility: CLINIC | Age: 68
End: 2018-01-01

## 2018-01-01 ENCOUNTER — RADIANT APPOINTMENT (OUTPATIENT)
Dept: CT IMAGING | Facility: CLINIC | Age: 68
End: 2018-01-01
Attending: RADIOLOGY
Payer: MEDICARE

## 2018-01-01 ENCOUNTER — APPOINTMENT (OUTPATIENT)
Dept: SURGERY | Facility: CLINIC | Age: 68
End: 2018-01-01
Payer: MEDICARE

## 2018-01-01 ENCOUNTER — PRE VISIT (OUTPATIENT)
Dept: ORTHOPEDICS | Facility: CLINIC | Age: 68
End: 2018-01-01

## 2018-01-01 ENCOUNTER — OFFICE VISIT (OUTPATIENT)
Dept: SURGERY | Facility: CLINIC | Age: 68
End: 2018-01-01
Payer: MEDICARE

## 2018-01-01 ENCOUNTER — OFFICE VISIT (OUTPATIENT)
Dept: GASTROENTEROLOGY | Facility: CLINIC | Age: 68
End: 2018-01-01
Attending: INTERNAL MEDICINE
Payer: MEDICARE

## 2018-01-01 ENCOUNTER — MEDICAL CORRESPONDENCE (OUTPATIENT)
Dept: HEALTH INFORMATION MANAGEMENT | Facility: CLINIC | Age: 68
End: 2018-01-01

## 2018-01-01 VITALS
HEIGHT: 64 IN | SYSTOLIC BLOOD PRESSURE: 107 MMHG | OXYGEN SATURATION: 99 % | HEART RATE: 102 BPM | WEIGHT: 182.98 LBS | DIASTOLIC BLOOD PRESSURE: 74 MMHG | BODY MASS INDEX: 31.24 KG/M2 | TEMPERATURE: 97.5 F

## 2018-01-01 VITALS — HEIGHT: 65 IN | WEIGHT: 192 LBS | BODY MASS INDEX: 31.99 KG/M2

## 2018-01-01 VITALS
WEIGHT: 175 LBS | OXYGEN SATURATION: 97 % | HEART RATE: 87 BPM | TEMPERATURE: 97.8 F | SYSTOLIC BLOOD PRESSURE: 106 MMHG | HEIGHT: 65 IN | DIASTOLIC BLOOD PRESSURE: 69 MMHG | BODY MASS INDEX: 29.16 KG/M2

## 2018-01-01 VITALS
TEMPERATURE: 97.5 F | HEART RATE: 102 BPM | SYSTOLIC BLOOD PRESSURE: 107 MMHG | WEIGHT: 182.98 LBS | DIASTOLIC BLOOD PRESSURE: 74 MMHG | RESPIRATION RATE: 16 BRPM | OXYGEN SATURATION: 99 % | BODY MASS INDEX: 30.45 KG/M2

## 2018-01-01 DIAGNOSIS — K74.60 CIRRHOSIS OF LIVER (H): Primary | ICD-10-CM

## 2018-01-01 DIAGNOSIS — R18.8 CIRRHOSIS OF LIVER WITH ASCITES, UNSPECIFIED HEPATIC CIRRHOSIS TYPE (H): Primary | ICD-10-CM

## 2018-01-01 DIAGNOSIS — K70.31 ALCOHOLIC CIRRHOSIS OF LIVER WITH ASCITES (H): ICD-10-CM

## 2018-01-01 DIAGNOSIS — M17.11 PRIMARY OSTEOARTHRITIS OF RIGHT KNEE: Primary | ICD-10-CM

## 2018-01-01 DIAGNOSIS — R18.8 CIRRHOSIS OF LIVER WITH ASCITES, UNSPECIFIED HEPATIC CIRRHOSIS TYPE (H): ICD-10-CM

## 2018-01-01 DIAGNOSIS — K74.60 CIRRHOSIS OF LIVER WITH ASCITES, UNSPECIFIED HEPATIC CIRRHOSIS TYPE (H): ICD-10-CM

## 2018-01-01 DIAGNOSIS — K70.31 ALCOHOLIC CIRRHOSIS OF LIVER WITH ASCITES (H): Primary | ICD-10-CM

## 2018-01-01 DIAGNOSIS — Z01.818 PREOP EXAMINATION: Primary | ICD-10-CM

## 2018-01-01 DIAGNOSIS — Z94.4 LIVER REPLACED BY TRANSPLANT (H): ICD-10-CM

## 2018-01-01 DIAGNOSIS — Z94.4 LIVER REPLACED BY TRANSPLANT (H): Primary | ICD-10-CM

## 2018-01-01 DIAGNOSIS — K74.60 CIRRHOSIS OF LIVER WITH ASCITES, UNSPECIFIED HEPATIC CIRRHOSIS TYPE (H): Primary | ICD-10-CM

## 2018-01-01 DIAGNOSIS — K76.6 PORTAL HYPERTENSION (H): Primary | ICD-10-CM

## 2018-01-01 LAB
ALBUMIN SERPL-MCNC: 2.4 G/DL (ref 3.4–5)
ALBUMIN SERPL-MCNC: 3.2 G/DL (ref 3.4–5)
ALP SERPL-CCNC: 303 U/L (ref 40–150)
ALP SERPL-CCNC: 324 U/L (ref 40–150)
ALT SERPL W P-5'-P-CCNC: 14 U/L (ref 0–50)
ALT SERPL W P-5'-P-CCNC: 16 U/L (ref 0–50)
ANION GAP SERPL CALCULATED.3IONS-SCNC: 10 MMOL/L (ref 3–14)
ANION GAP SERPL CALCULATED.3IONS-SCNC: 12 MMOL/L (ref 3–14)
AST SERPL W P-5'-P-CCNC: 16 U/L (ref 0–45)
AST SERPL W P-5'-P-CCNC: 26 U/L (ref 0–45)
BILIRUB DIRECT SERPL-MCNC: 0.6 MG/DL (ref 0–0.2)
BILIRUB SERPL-MCNC: 1 MG/DL (ref 0.2–1.3)
BILIRUB SERPL-MCNC: 1 MG/DL (ref 0.2–1.3)
BUN SERPL-MCNC: 22 MG/DL (ref 7–30)
BUN SERPL-MCNC: 34 MG/DL (ref 7–30)
CALCIUM SERPL-MCNC: 8.5 MG/DL (ref 8.5–10.1)
CALCIUM SERPL-MCNC: 8.5 MG/DL (ref 8.5–10.1)
CHLORIDE SERPL-SCNC: 105 MMOL/L (ref 94–109)
CHLORIDE SERPL-SCNC: 98 MMOL/L (ref 94–109)
CO2 SERPL-SCNC: 20 MMOL/L (ref 20–32)
CO2 SERPL-SCNC: 24 MMOL/L (ref 20–32)
CREAT BLD-MCNC: 1.1 MG/DL (ref 0.52–1.04)
CREAT SERPL-MCNC: 1.14 MG/DL (ref 0.52–1.04)
CREAT SERPL-MCNC: 1.24 MG/DL (ref 0.52–1.04)
ERYTHROCYTE [DISTWIDTH] IN BLOOD BY AUTOMATED COUNT: 16.5 % (ref 10–15)
ERYTHROCYTE [DISTWIDTH] IN BLOOD BY AUTOMATED COUNT: 19.9 % (ref 10–15)
GFR SERPL CREATININE-BSD FRML MDRD: 43 ML/MIN/1.7M2
GFR SERPL CREATININE-BSD FRML MDRD: 47 ML/MIN/1.7M2
GFR SERPL CREATININE-BSD FRML MDRD: 49 ML/MIN/1.7M2
GLUCOSE SERPL-MCNC: 167 MG/DL (ref 70–99)
GLUCOSE SERPL-MCNC: 232 MG/DL (ref 70–99)
HCT VFR BLD AUTO: 36.1 % (ref 35–47)
HCT VFR BLD AUTO: 37.1 % (ref 35–47)
HGB BLD-MCNC: 11.7 G/DL (ref 11.7–15.7)
HGB BLD-MCNC: 11.8 G/DL (ref 11.7–15.7)
INR PPP: 1.25 (ref 0.86–1.14)
MCH RBC QN AUTO: 25.9 PG (ref 26.5–33)
MCH RBC QN AUTO: 26.6 PG (ref 26.5–33)
MCHC RBC AUTO-ENTMCNC: 31.8 G/DL (ref 31.5–36.5)
MCHC RBC AUTO-ENTMCNC: 32.4 G/DL (ref 31.5–36.5)
MCV RBC AUTO: 80 FL (ref 78–100)
MCV RBC AUTO: 84 FL (ref 78–100)
PLATELET # BLD AUTO: 142 10E9/L (ref 150–450)
PLATELET # BLD AUTO: 157 10E9/L (ref 150–450)
POTASSIUM SERPL-SCNC: 4.1 MMOL/L (ref 3.4–5.3)
POTASSIUM SERPL-SCNC: 4.6 MMOL/L (ref 3.4–5.3)
PROT SERPL-MCNC: 5.4 G/DL (ref 6.8–8.8)
PROT SERPL-MCNC: 6.1 G/DL (ref 6.8–8.8)
RBC # BLD AUTO: 4.43 10E12/L (ref 3.8–5.2)
RBC # BLD AUTO: 4.51 10E12/L (ref 3.8–5.2)
SODIUM SERPL-SCNC: 132 MMOL/L (ref 133–144)
SODIUM SERPL-SCNC: 137 MMOL/L (ref 133–144)
WBC # BLD AUTO: 11.4 10E9/L (ref 4–11)
WBC # BLD AUTO: 6.6 10E9/L (ref 4–11)

## 2018-01-01 PROCEDURE — 85027 COMPLETE CBC AUTOMATED: CPT | Performed by: INTERNAL MEDICINE

## 2018-01-01 PROCEDURE — G0463 HOSPITAL OUTPT CLINIC VISIT: HCPCS | Mod: ZF

## 2018-01-01 PROCEDURE — 36415 COLL VENOUS BLD VENIPUNCTURE: CPT | Performed by: INTERNAL MEDICINE

## 2018-01-01 PROCEDURE — 80048 BASIC METABOLIC PNL TOTAL CA: CPT | Performed by: INTERNAL MEDICINE

## 2018-01-01 PROCEDURE — 80076 HEPATIC FUNCTION PANEL: CPT | Performed by: INTERNAL MEDICINE

## 2018-01-01 RX ORDER — AMPICILLIN AND SULBACTAM 2; 1 G/1; G/1
3 INJECTION, POWDER, FOR SOLUTION INTRAMUSCULAR; INTRAVENOUS
Status: CANCELLED | OUTPATIENT
Start: 2018-01-01

## 2018-01-01 RX ORDER — URSODIOL 300 MG/1
CAPSULE ORAL
Qty: 270 CAPSULE | Refills: 3 | Status: SHIPPED | OUTPATIENT
Start: 2018-01-01

## 2018-01-01 RX ORDER — IOPAMIDOL 755 MG/ML
107 INJECTION, SOLUTION INTRAVASCULAR ONCE
Status: COMPLETED | OUTPATIENT
Start: 2018-01-01 | End: 2018-01-01

## 2018-01-01 RX ORDER — LEVOFLOXACIN 750 MG/1
750 TABLET, FILM COATED ORAL DAILY
COMMUNITY
End: 2018-01-01

## 2018-01-01 RX ADMIN — IOPAMIDOL 107 ML: 755 INJECTION, SOLUTION INTRAVASCULAR at 09:29

## 2018-01-01 ASSESSMENT — ENCOUNTER SYMPTOMS
MUSCLE WEAKNESS: 1
DIZZINESS: 1
BLOOD IN STOOL: 0
MUSCLE CRAMPS: 0
HYPOTENSION: 0
EYE PAIN: 0
MYALGIAS: 1
INSOMNIA: 1
NECK PAIN: 0
SLEEP DISTURBANCES DUE TO BREATHING: 0
FATIGUE: 1
ALTERED TEMPERATURE REGULATION: 1
FLANK PAIN: 0
HOARSE VOICE: 0
SHORTNESS OF BREATH: 1
DYSURIA: 0
DECREASED APPETITE: 1
DOUBLE VISION: 0
EXERCISE INTOLERANCE: 0
NAIL CHANGES: 0
NECK PAIN: 1
DEPRESSION: 1
SYNCOPE: 0
MYALGIAS: 0
POOR WOUND HEALING: 0
WEIGHT LOSS: 1
DISTURBANCES IN COORDINATION: 1
POLYPHAGIA: 0
TROUBLE SWALLOWING: 0
MUSCLE CRAMPS: 0
NERVOUS/ANXIOUS: 1
PARALYSIS: 0
TREMORS: 0
SEIZURES: 0
NERVOUS/ANXIOUS: 0
CHILLS: 0
COUGH DISTURBING SLEEP: 1
COUGH: 1
NECK MASS: 0
LOSS OF CONSCIOUSNESS: 0
HALLUCINATIONS: 0
STIFFNESS: 1
PALPITATIONS: 0
EYE REDNESS: 0
SMELL DISTURBANCE: 0
LIGHT-HEADEDNESS: 0
JAUNDICE: 0
HEMOPTYSIS: 0
ORTHOPNEA: 0
POLYDIPSIA: 1
SINUS PAIN: 1
HYPOTENSION: 0
JOINT SWELLING: 1
RECTAL PAIN: 0
DISTURBANCES IN COORDINATION: 1
SLEEP DISTURBANCES DUE TO BREATHING: 1
SYNCOPE: 0
FEVER: 0
ARTHRALGIAS: 1
POSTURAL DYSPNEA: 1
SORE THROAT: 0
SNORES LOUDLY: 0
HYPERTENSION: 0
SEIZURES: 0
HEMOPTYSIS: 0
TINGLING: 0
COUGH: 1
WEIGHT GAIN: 0
HOARSE VOICE: 1
NUMBNESS: 0
SINUS CONGESTION: 1
TROUBLE SWALLOWING: 0
DECREASED CONCENTRATION: 1
EXERCISE INTOLERANCE: 0
DIFFICULTY URINATING: 0
COUGH DISTURBING SLEEP: 1
SWOLLEN GLANDS: 0
ARTHRALGIAS: 1
BACK PAIN: 1
INCREASED ENERGY: 0
STIFFNESS: 0
WHEEZING: 0
ABDOMINAL PAIN: 0
SINUS PAIN: 0
TREMORS: 1
SKIN CHANGES: 0
TINGLING: 0
EYE WATERING: 0
BOWEL INCONTINENCE: 1
LEG PAIN: 1
MEMORY LOSS: 1
NUMBNESS: 0
HEARTBURN: 0
SPUTUM PRODUCTION: 0
WEAKNESS: 1
LIGHT-HEADEDNESS: 0
SPEECH CHANGE: 0
SINUS CONGESTION: 1
EYE IRRITATION: 0
DIZZINESS: 1
NIGHT SWEATS: 0
SMELL DISTURBANCE: 0
CONSTIPATION: 0
DECREASED CONCENTRATION: 1
BACK PAIN: 1
TASTE DISTURBANCE: 1
MEMORY LOSS: 1
NECK MASS: 0
HEADACHES: 0
POSTURAL DYSPNEA: 0
INSOMNIA: 1
WEAKNESS: 1
ORTHOPNEA: 1
PALPITATIONS: 0
LOSS OF CONSCIOUSNESS: 0
SNORES LOUDLY: 0
SORE THROAT: 1
HEMATURIA: 0
VOMITING: 0
WHEEZING: 0
DIARRHEA: 1
BLOATING: 1
DYSPNEA ON EXERTION: 1
PANIC: 0
HEADACHES: 1
PARALYSIS: 0
SPEECH CHANGE: 0
NAUSEA: 0
PANIC: 0
MUSCLE WEAKNESS: 1
DYSPNEA ON EXERTION: 1
LEG PAIN: 1
HYPERTENSION: 1
SPUTUM PRODUCTION: 1
SHORTNESS OF BREATH: 1
JOINT SWELLING: 1
BRUISES/BLEEDS EASILY: 1
DEPRESSION: 1
TASTE DISTURBANCE: 1

## 2018-01-01 ASSESSMENT — KOOS JR
HOW SEVERE IS YOUR KNEE STIFFNESS AFTER FIRST WAKING IN MORNING: SEVERE
HOW SEVERE IS YOUR KNEE STIFFNESS AFTER FIRST WAKING IN MORNING: 1

## 2018-01-01 ASSESSMENT — PAIN SCALES - GENERAL
PAINLEVEL: MODERATE PAIN (5)
PAINLEVEL: MODERATE PAIN (5)

## 2018-01-01 ASSESSMENT — LIFESTYLE VARIABLES: TOBACCO_USE: 1

## 2018-01-01 ASSESSMENT — ACTIVITIES OF DAILY LIVING (ADL): FUNCTION,_DAILY_LIVING_SCORE: 35.29

## 2018-01-03 NOTE — TELEPHONE ENCOUNTER
APPT INFO    Date /Time: 1/22/18 9:45AM   Reason for Appt: Chronic R Knee Pain/ OA   Ref Provider/Clinic: Dr. Alexy Callejas   Are there internal records? Yes/No?  IF YES, list clinic names: no   Are there outside records? Yes/No? Yes - see below   Patient Contact (Y/N) & Call Details: No - pt is referred   Action: Records received from [place] and forwarded to clinic     OUTSIDE RECORDS CHECKLIST     CLINIC NAME COMMENTS REC (x) IMG (x)   [facility]  x x

## 2018-01-03 NOTE — TELEPHONE ENCOUNTER
Records Received From: [facility] Owens    Date/Exam/Location  (specify location if different)   Office Notes: 11/22/17   Radiology Reports: XR R Knee 11/22/17   Missing: Need imaging

## 2018-01-03 NOTE — TELEPHONE ENCOUNTER
Received Imaging From: [facility] Owens    Image Type (x): Disc:___  Pacs:__x_      Exam Date/Name: XR R Knee 11/22/17 Comments: imaging in pacs

## 2018-01-22 NOTE — PROGRESS NOTES
"[facility]  Orthopaedic Surgery, Joint Replacement Consultation  by Jerry Guerra M.D.    Miriam Magana MRN# [id number]   Age: 67 year old YOB: 1950     Requesting physician: Yvon Hansen            Assessment and Plan:   Assessment:  1. R knee DJD  2. 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 S/p biliary stent and recurring cholangitis  4. S/p liver transplant   5. IDDM  6. Chronic kidney disease       Plan:  1. Given the degree of DJD, A TKA would be reasonable but the concern is when to do this vis a vis her liver disease and other co morbidities.  I will confer with Dr. Ureña.    2. Check HbA1c. Target 7.0 or less before TKA performed.    3. She is having ongoing dental work done.  This all needs to be completed prior to any knee replacement surgery.             History of Present Illness:   67 year old female  chief complaint    Longstanding R knee pain x 6-12 mo.  DR. Alexy Callejas was considering TKA but sent pt to  given her comorbidities.    Current symptoms:  Problem: as above   Stairs: non reciprocal fashion  Awakens from sleep due to sx's:  No  Precipitating Injury:  No    Other joints or sites painful:  No   Prior treatments:  Steroid injections not helpful anymore. Last one 9 mo ago.           Physical Exam:     EXAMINATION pertinent findings:   VITAL SIGNS: Height 1.638 m (5' 4.5\"), weight 87.1 kg (192 lb), not currently breastfeeding.  Body mass index is 32.45 kg/(m^2).  RESP: non labored breathing   ABD: benign   SKIN: grossly normal   LYMPHATIC: grossly normal   NEURO: grossly normal   VASCULAR: satisfactory perfusion of all extremities 2+ DP, absent PT pulse R foot  MUSCULOSKELETAL:   R knee:   deg ROM  valentin lig stable  No effusion   Slight warmth    L knee: nl    Hips nl         Data:   All laboratory data reviewed  All imaging studies reviewed by me         Jerry Guerra MD  [facility] Family Professor  Oncology and Adult Reconstructive " Surgery  Dept Orthopaedic Surgery, [facility]  [phone] office, [phone] pager  [email or web address]            DATA for DOCUMENTATION:         Past Medical History:     Patient Active Problem List   Diagnosis     Chronic kidney disease     Depressive disorder     Type II diabetes mellitus (H)     Myalgia and myositis     Esophagitis     Hyperlipidemia     Memory loss     Obstructive sleep apnea     Osteoporosis     Glucocorticoid deficiency (H)     Urinary incontinence     GIB (gastrointestinal bleeding)     Suicidal ideation     GI bleed     Shoulder arthritis     Osteoarthritis of left glenohumeral joint     Past Medical History:   Diagnosis Date     Cirrhosis, biliary (H)     s/p Liver transplant in 1995     CKD (chronic kidney disease)      Depression      Diabetes type 2, controlled (H)      Esophagitis      Fibromyalgia      History of blood transfusion      Hypertension      Memory impairment      MALIK (obstructive sleep apnea)     CPAP     Other chronic pain     bilateral shoulder     Secondary adrenal insufficiency (H)      Urinary incontinence        Also see scanned health assessment forms.       Past Surgical History:     Past Surgical History:   Procedure Laterality Date     C section X 2       ENDOSCOPIC RETROGRADE CHOLANGIOPANCREATOGRAM N/A 10/2/2014    Procedure: ENDOSCOPIC RETROGRADE CHOLANGIOPANCREATOGRAM;  Surgeon: Fernando West MD;  [facility]     ENDOSCOPIC RETROGRADE CHOLANGIOPANCREATOGRAM N/A 9/14/2016    Procedure: ENDOSCOPIC RETROGRADE CHOLANGIOPANCREATOGRAM;  Surgeon: Fernando West MD;  [facility]     ENDOSCOPIC RETROGRADE CHOLANGIOPANCREATOGRAM N/A 7/5/2017    Procedure: COMBINED ENDOSCOPIC RETROGRADE CHOLANGIOPANCREATOGRAPHY, PLACE TUBE/STENT;  Endoscopic Retrograde Cholangiopancreatogram with Biliary Stone Removal, Biliary Duct Dilation, Biliary Duct Stent Placement X2;  Surgeon: Fernando West MD;  [facility]     ENDOSCOPIC  RETROGRADE CHOLANGIOPANCREATOGRAPHY, EXCHANGE TUBE/STENT N/A 2017    Procedure: ENDOSCOPIC RETROGRADE CHOLANGIOPANCREATOGRAPHY, EXCHANGE TUBE/STENT;  Endoscopic Retrograde Cholangiopancreatogram with Stone Removal and Stent Exchange;  Surgeon: Fernando West MD;  [facility]     ESOPHAGOSCOPY, GASTROSCOPY, DUODENOSCOPY (EGD), COMBINED N/A 10/2/2014    Procedure: COMBINED ESOPHAGOSCOPY, GASTROSCOPY, DUODENOSCOPY (EGD);  Surgeon: Fernando West MD;  [facility]     ESOPHAGOSCOPY, GASTROSCOPY, DUODENOSCOPY (EGD), COMBINED N/A 8/10/2017    Procedure: COMBINED ESOPHAGOSCOPY, GASTROSCOPY, DUODENOSCOPY (EGD);  EGD;  Surgeon: Malu Morris MD;  [facility]     REVERSE ARTHROPLASTY SHOULDER Left 2016    Procedure: REVERSE ARTHROPLASTY SHOULDER;  Surgeon: Sue Holcomb MD;  [facility]     TRANSPLANT LIVER RECIPIENT  DONOR              Social History:     Social History     Social History     Marital status:      Spouse name: N/A     Number of children: N/A     Years of education: N/A     Occupational History     Not on file.     Social History Main Topics     Smoking status: Former Smoker     Types: Cigarettes     Quit date: 1991     Smokeless tobacco: Never Used      Comment: smoked for 15 years     Alcohol use No     Drug use: No     Sexual activity: Not on file     Other Topics Concern     Not on file     Social History Narrative            Family History:       Family History   Problem Relation Age of Onset     Dementia Father      Dementia Mother      Heart Failure Mother      Myocardial Infarction Mother      Hypertension Mother      DIABETES Brother      CANCER Sister             Medications:     Current Outpatient Prescriptions   Medication Sig     spironolactone (ALDACTONE) 25 MG tablet Take 25 mg by mouth daily     cephALEXin (KEFLEX) 500 MG capsule Take 500 mg by mouth 3 times daily     gabapentin (NEURONTIN) 300 MG capsule  Take 1 capsule (300 mg) by mouth 3 times daily     ferrous gluconate (FERGON) 324 (38 FE) MG tablet Take 1 tablet (324 mg) by mouth 3 times daily (with meals)     predniSONE (DELTASONE) 5 MG tablet Take 1 tablet (5 mg) by mouth daily     furosemide (LASIX) 40 MG tablet Take 1 tablet (40 mg) by mouth daily (Patient taking differently: Take 100 mg by mouth daily )     ursodiol (ACTIGALL) 300 MG capsule Take 2 caps in a.m. And 1 cap in p.m.     omeprazole (PRILOSEC) 20 MG CR capsule Take 1 capsule (20 mg) by mouth daily     senna-docusate (SENOKOT-S;PERICOLACE) 8.6-50 MG per tablet Take 1-2 tablets by mouth 2 times daily as needed for constipation (while taking narcotic pain medications)     brexpiprazole (REXULTI) 0.5 MG tablet Take 1 mg by mouth daily (with breakfast)      fluticasone (FLONASE) 50 MCG/ACT spray Spray 1 spray into both nostrils 2 times daily as needed for allergies      CELLCEPT 500 MG PO TABLET Take 2 tablets (1,000 mg) by mouth 2 times daily     multivitamin, therapeutic with minerals (THERA-VIT-M) TABS Take 1 tablet by mouth daily     acetaminophen (TYLENOL) 500 MG tablet Take 1,000 mg by mouth 2 times daily as needed for mild pain or fever      atorvastatin (LIPITOR) 40 MG tablet TAKE 1 TABLET BY MOUTH ONCE DAILY.     cetirizine (ZYRTEC) 10 MG tablet TAKE 1 TABLET BY MOUTH ONCE DAILY.     metFORMIN (GLUCOPHAGE) 500 MG tablet 750 mg at HS     buPROPion (WELLBUTRIN XL) 150 MG 24 hr tablet Take 150 mg by mouth every morning with 300mg total dose of 450mg     sertraline (ZOLOFT) 100 MG tablet Take 200 mg by mouth daily AM     LORazepam (ATIVAN) 1 MG tablet Take 0.5 tablets (0.5 mg) by mouth every 6 hours as needed (Patient taking differently: Take 1 mg by mouth At Bedtime )     traZODone (DESYREL) 50 MG tablet Take 1-2 tablets ( mg) by mouth nightly as needed for sleep (Patient taking differently: Take 150 mg by mouth nightly as needed for sleep )     insulin glargine (LANTUS) 100 UNIT/ML PEN  Start with 11 units of Lantus at night when you go home. Check your blood sugars at least twice a day at home and if you have hypoglycemia symptoms; please adjust insulin appropriately (Patient taking differently: Inject 55 Units Subcutaneous At Bedtime )     pramipexole (MIRAPEX) 0.125 MG tablet Take 1-2 tablets (0.125-0.25 mg) by mouth At Bedtime (Patient taking differently: Take 0.5 mg by mouth At Bedtime )     propranolol (INDERAL) 40 MG tablet Take 1 tablet (40 mg) by mouth 2 times daily     insulin lispro (HUMALOG KWIKPEN) 100 UNIT/ML soln Use 2.5 Units/15gm carbs with meals. Sliding scale as needed:   Do Not give Correction Insulin if Pre-Meal BG < 140.  For Pre-Meal  - 189 give 1 unit.  For Pre-Meal  - 239 give 2 units.  For Pre-Meal  - 289 give 3 units.  For Pre-Meal  - 339 give 4 units.  For Pre-Meal - 399 give 5 units.  For Pre-Meal -449 give 6 units For Pre-Meal BG = or > 450 give 7 units. (Patient taking differently: 16 Units 3 times daily (before meals) Use 3 Units/15gm carbs with meals. Sliding scale as needed:   Do Not give Correction Insulin if Pre-Meal BG < 140.  For Pre-Meal  - 189 give 1 unit.  For Pre-Meal  - 239 give 2 units.  For Pre-Meal  - 289 give 3 units.  For Pre-Meal  - 339 give 4 units.  For Pre-Meal - 399 give 5 units.  For Pre-Meal -449 give 6 units For Pre-Meal BG = or > 450 give 7 units.)     buPROPion (WELLBUTRIN XL) 300 MG 24 hr tablet Take 450 mg by mouth daily      polyvinyl alcohol (ARTIFICIAL TEARS) 1.4 % ophthalmic solution Place 1 drop into both eyes as needed     BusPIRone HCl 30 MG TABS Take  by mouth 2 times daily.     calcium citrate-vitamin D (CITRACAL) 315-250 MG-UNIT TABS Take 2 tablets by mouth 2 times daily.     Cholecalciferol (VITAMIN D) 2000 UNIT tablet Take 2 tablets by mouth daily AM     No current facility-administered medications for this visit.               Review of Systems:   A  comprehensive 10 point review of systems (constitutional, ENT, cardiac, peripheral vascular, lymphatic, respiratory, GI, , Musculoskeletal, skin, Neurological) was performed and found to be negative except as described in this note.     See intake form completed by patient

## 2018-01-22 NOTE — LETTER
"1/22/2018       RE: Miriam Magana  [id number]  [address]  [place] [address]     Dear Colleague,    Thank you for referring your patient, Miriam Magana, to the [facility] at [facility]. Please see a copy of my visit note below.        [facility]  Orthopaedic Surgery, Joint Replacement Consultation  by Jerry Guerra M.D.    Miriam Magana MRN# [id number]   Age: 67 year old YOB: 1950     Requesting physician: Yvon Hansen            Assessment and Plan:   Assessment:  1. R knee DJD  2. 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 S/p biliary stent and recurring cholangitis  4. S/p liver transplant   5. IDDM  6. Chronic kidney disease       Plan:  1. Given the degree of DJD, A TKA would be reasonable but the concern is when to do this vis a vis her liver disease and other co morbidities.  I will confer with Dr. Ureña.    2. Check HbA1c. Target 7.0 or less before TKA performed.    3. She is having ongoing dental work done.  This all needs to be completed prior to any knee replacement surgery.             History of Present Illness:   67 year old female  chief complaint    Longstanding R knee pain x 6-12 mo.  DR. Alexy Callejas was considering TKA but sent pt to  given her comorbidities.    Current symptoms:  Problem: as above   Stairs: non reciprocal fashion  Awakens from sleep due to sx's:  No  Precipitating Injury:  No    Other joints or sites painful:  No   Prior treatments:  Steroid injections not helpful anymore. Last one 9 mo ago.           Physical Exam:     EXAMINATION pertinent findings:   VITAL SIGNS: Height 1.638 m (5' 4.5\"), weight 87.1 kg (192 lb), not currently breastfeeding.  Body mass index is 32.45 kg/(m^2).  RESP: non labored breathing   ABD: benign   SKIN: grossly normal   LYMPHATIC: grossly normal   NEURO: grossly normal   VASCULAR: satisfactory perfusion of all extremities 2+ DP, absent PT pulse R " foot  MUSCULOSKELETAL:   R knee:   deg ROM  valentin lig stable  No effusion   Slight warmth    L knee: nl    Hips nl         Data:   All laboratory data reviewed  All imaging studies reviewed by me         MD Amadeo [facility]enton Family Professor  Oncology and Adult Reconstructive Surgery  Dept Orthopaedic Surgery, [facility]  [phone] office, [phone] pager  [email or web address]            DATA for DOCUMENTATION:         Past Medical History:     Patient Active Problem List   Diagnosis     Chronic kidney disease     Depressive disorder     Type II diabetes mellitus (H)     Myalgia and myositis     Esophagitis     Hyperlipidemia     Memory loss     Obstructive sleep apnea     Osteoporosis     Glucocorticoid deficiency (H)     Urinary incontinence     GIB (gastrointestinal bleeding)     Suicidal ideation     GI bleed     Shoulder arthritis     Osteoarthritis of left glenohumeral joint     Past Medical History:   Diagnosis Date     Cirrhosis, biliary (H)     s/p Liver transplant in 1995     CKD (chronic kidney disease)      Depression      Diabetes type 2, controlled (H)      Esophagitis      Fibromyalgia      History of blood transfusion      Hypertension      Memory impairment      MALIK (obstructive sleep apnea)     CPAP     Other chronic pain     bilateral shoulder     Secondary adrenal insufficiency (H)      Urinary incontinence        Also see scanned health assessment forms.       Past Surgical History:     Past Surgical History:   Procedure Laterality Date     C section X 2       ENDOSCOPIC RETROGRADE CHOLANGIOPANCREATOGRAM N/A 10/2/2014    Procedure: ENDOSCOPIC RETROGRADE CHOLANGIOPANCREATOGRAM;  Surgeon: Fernando West MD;  [facility]     ENDOSCOPIC RETROGRADE CHOLANGIOPANCREATOGRAM N/A 9/14/2016    Procedure: ENDOSCOPIC RETROGRADE CHOLANGIOPANCREATOGRAM;  Surgeon: Fernando West MD;  [facility]     ENDOSCOPIC RETROGRADE CHOLANGIOPANCREATOGRAM N/A 7/5/2017     Procedure: COMBINED ENDOSCOPIC RETROGRADE CHOLANGIOPANCREATOGRAPHY, PLACE TUBE/STENT;  Endoscopic Retrograde Cholangiopancreatogram with Biliary Stone Removal, Biliary Duct Dilation, Biliary Duct Stent Placement X2;  Surgeon: Fernando West MD;  [facility]     ENDOSCOPIC RETROGRADE CHOLANGIOPANCREATOGRAPHY, EXCHANGE TUBE/STENT N/A 2017    Procedure: ENDOSCOPIC RETROGRADE CHOLANGIOPANCREATOGRAPHY, EXCHANGE TUBE/STENT;  Endoscopic Retrograde Cholangiopancreatogram with Stone Removal and Stent Exchange;  Surgeon: Fernando West MD;  [facility]     ESOPHAGOSCOPY, GASTROSCOPY, DUODENOSCOPY (EGD), COMBINED N/A 10/2/2014    Procedure: COMBINED ESOPHAGOSCOPY, GASTROSCOPY, DUODENOSCOPY (EGD);  Surgeon: Fernando West MD;  [facility]     ESOPHAGOSCOPY, GASTROSCOPY, DUODENOSCOPY (EGD), COMBINED N/A 8/10/2017    Procedure: COMBINED ESOPHAGOSCOPY, GASTROSCOPY, DUODENOSCOPY (EGD);  EGD;  Surgeon: Malu Morris MD;  [facility]     REVERSE ARTHROPLASTY SHOULDER Left 2016    Procedure: REVERSE ARTHROPLASTY SHOULDER;  Surgeon: Sue Holcomb MD;  [facility]     TRANSPLANT LIVER RECIPIENT  DONOR              Social History:     Social History     Social History     Marital status:      Spouse name: N/A     Number of children: N/A     Years of education: N/A     Occupational History     Not on file.     Social History Main Topics     Smoking status: Former Smoker     Types: Cigarettes     Quit date: 1991     Smokeless tobacco: Never Used      Comment: smoked for 15 years     Alcohol use No     Drug use: No     Sexual activity: Not on file     Other Topics Concern     Not on file     Social History Narrative            Family History:       Family History   Problem Relation Age of Onset     Dementia Father      Dementia Mother      Heart Failure Mother      Myocardial Infarction Mother      Hypertension Mother      DIABETES Brother       CANCER Sister             Medications:     Current Outpatient Prescriptions   Medication Sig     spironolactone (ALDACTONE) 25 MG tablet Take 25 mg by mouth daily     cephALEXin (KEFLEX) 500 MG capsule Take 500 mg by mouth 3 times daily     gabapentin (NEURONTIN) 300 MG capsule Take 1 capsule (300 mg) by mouth 3 times daily     ferrous gluconate (FERGON) 324 (38 FE) MG tablet Take 1 tablet (324 mg) by mouth 3 times daily (with meals)     predniSONE (DELTASONE) 5 MG tablet Take 1 tablet (5 mg) by mouth daily     furosemide (LASIX) 40 MG tablet Take 1 tablet (40 mg) by mouth daily (Patient taking differently: Take 100 mg by mouth daily )     ursodiol (ACTIGALL) 300 MG capsule Take 2 caps in a.m. And 1 cap in p.m.     omeprazole (PRILOSEC) 20 MG CR capsule Take 1 capsule (20 mg) by mouth daily     senna-docusate (SENOKOT-S;PERICOLACE) 8.6-50 MG per tablet Take 1-2 tablets by mouth 2 times daily as needed for constipation (while taking narcotic pain medications)     brexpiprazole (REXULTI) 0.5 MG tablet Take 1 mg by mouth daily (with breakfast)      fluticasone (FLONASE) 50 MCG/ACT spray Spray 1 spray into both nostrils 2 times daily as needed for allergies      CELLCEPT 500 MG PO TABLET Take 2 tablets (1,000 mg) by mouth 2 times daily     multivitamin, therapeutic with minerals (THERA-VIT-M) TABS Take 1 tablet by mouth daily     acetaminophen (TYLENOL) 500 MG tablet Take 1,000 mg by mouth 2 times daily as needed for mild pain or fever      atorvastatin (LIPITOR) 40 MG tablet TAKE 1 TABLET BY MOUTH ONCE DAILY.     cetirizine (ZYRTEC) 10 MG tablet TAKE 1 TABLET BY MOUTH ONCE DAILY.     metFORMIN (GLUCOPHAGE) 500 MG tablet 750 mg at HS     buPROPion (WELLBUTRIN XL) 150 MG 24 hr tablet Take 150 mg by mouth every morning with 300mg total dose of 450mg     sertraline (ZOLOFT) 100 MG tablet Take 200 mg by mouth daily AM     LORazepam (ATIVAN) 1 MG tablet Take 0.5 tablets (0.5 mg) by mouth every 6 hours as needed (Patient  taking differently: Take 1 mg by mouth At Bedtime )     traZODone (DESYREL) 50 MG tablet Take 1-2 tablets ( mg) by mouth nightly as needed for sleep (Patient taking differently: Take 150 mg by mouth nightly as needed for sleep )     insulin glargine (LANTUS) 100 UNIT/ML PEN Start with 11 units of Lantus at night when you go home. Check your blood sugars at least twice a day at home and if you have hypoglycemia symptoms; please adjust insulin appropriately (Patient taking differently: Inject 55 Units Subcutaneous At Bedtime )     pramipexole (MIRAPEX) 0.125 MG tablet Take 1-2 tablets (0.125-0.25 mg) by mouth At Bedtime (Patient taking differently: Take 0.5 mg by mouth At Bedtime )     propranolol (INDERAL) 40 MG tablet Take 1 tablet (40 mg) by mouth 2 times daily     insulin lispro (HUMALOG KWIKPEN) 100 UNIT/ML soln Use 2.5 Units/15gm carbs with meals. Sliding scale as needed:   Do Not give Correction Insulin if Pre-Meal BG < 140.  For Pre-Meal  - 189 give 1 unit.  For Pre-Meal  - 239 give 2 units.  For Pre-Meal  - 289 give 3 units.  For Pre-Meal  - 339 give 4 units.  For Pre-Meal - 399 give 5 units.  For Pre-Meal -449 give 6 units For Pre-Meal BG = or > 450 give 7 units. (Patient taking differently: 16 Units 3 times daily (before meals) Use 3 Units/15gm carbs with meals. Sliding scale as needed:   Do Not give Correction Insulin if Pre-Meal BG < 140.  For Pre-Meal  - 189 give 1 unit.  For Pre-Meal  - 239 give 2 units.  For Pre-Meal  - 289 give 3 units.  For Pre-Meal  - 339 give 4 units.  For Pre-Meal - 399 give 5 units.  For Pre-Meal -449 give 6 units For Pre-Meal BG = or > 450 give 7 units.)     buPROPion (WELLBUTRIN XL) 300 MG 24 hr tablet Take 450 mg by mouth daily      polyvinyl alcohol (ARTIFICIAL TEARS) 1.4 % ophthalmic solution Place 1 drop into both eyes as needed     BusPIRone HCl 30 MG TABS Take  by mouth 2 times daily.      calcium citrate-vitamin D (CITRACAL) 315-250 MG-UNIT TABS Take 2 tablets by mouth 2 times daily.     Cholecalciferol (VITAMIN D) 2000 UNIT tablet Take 2 tablets by mouth daily AM     No current facility-administered medications for this visit.               Review of Systems:   A comprehensive 10 point review of systems (constitutional, ENT, cardiac, peripheral vascular, lymphatic, respiratory, GI, , Musculoskeletal, skin, Neurological) was performed and found to be negative except as described in this note.     See intake form completed by patient      Again, thank you for allowing me to participate in the care of your patient.      Sincerely,    Jerry Guerra MD

## 2018-01-22 NOTE — NURSING NOTE
"Chief Complaint   Patient presents with     Consult     Pt states that she is here today for Right Knee Osteoarthritis. Referring:  MARINO HOWARD       67 year old  1950    Ht 1.638 m (5' 4.5\")  Wt 87.1 kg (192 lb)  BMI 32.45 kg/m2      [id number] - [place], [place] - [address]    Allergies   Allergen Reactions     Blood Transfusion Related (Informational Only) Other (See Comments)     Patient has a history of a clinically significant antibody against RBC antigens.  A delay in compatible RBCs may occur.  Patient has Big Anti E, anti Pulido-a and unidentified antibody.       Aspirin Other (See Comments)     due to liver transplant and high bleed risk     Lunesta 2mg [Eszopiclone] Other (See Comments)     Suicidal   vivid dreams     Morphine Other (See Comments)     Bad dreams     Pravastatin Unknown and Other (See Comments)     Escobar's      Current Outpatient Prescriptions   Medication     spironolactone (ALDACTONE) 25 MG tablet     cephALEXin (KEFLEX) 500 MG capsule     gabapentin (NEURONTIN) 300 MG capsule     ferrous gluconate (FERGON) 324 (38 FE) MG tablet     predniSONE (DELTASONE) 5 MG tablet     furosemide (LASIX) 40 MG tablet     ursodiol (ACTIGALL) 300 MG capsule     omeprazole (PRILOSEC) 20 MG CR capsule     senna-docusate (SENOKOT-S;PERICOLACE) 8.6-50 MG per tablet     brexpiprazole (REXULTI) 0.5 MG tablet     fluticasone (FLONASE) 50 MCG/ACT spray     CELLCEPT 500 MG PO TABLET     multivitamin, therapeutic with minerals (THERA-VIT-M) TABS     acetaminophen (TYLENOL) 500 MG tablet     atorvastatin (LIPITOR) 40 MG tablet     cetirizine (ZYRTEC) 10 MG tablet     metFORMIN (GLUCOPHAGE) 500 MG tablet     buPROPion (WELLBUTRIN XL) 150 MG 24 hr tablet     sertraline (ZOLOFT) 100 MG tablet     LORazepam (ATIVAN) 1 MG tablet     traZODone (DESYREL) 50 MG tablet     insulin glargine (LANTUS) 100 UNIT/ML PEN     pramipexole (MIRAPEX) 0.125 MG tablet     propranolol (INDERAL) 40 MG tablet     " insulin lispro (HUMALOG KWIKPEN) 100 UNIT/ML soln     buPROPion (WELLBUTRIN XL) 300 MG 24 hr tablet     polyvinyl alcohol (ARTIFICIAL TEARS) 1.4 % ophthalmic solution     BusPIRone HCl 30 MG TABS     calcium citrate-vitamin D (CITRACAL) 315-250 MG-UNIT TABS     Cholecalciferol (VITAMIN D) 2000 UNIT tablet     No current facility-administered medications for this visit.                Justine Aggarwal C.M.A.

## 2018-01-22 NOTE — MR AVS SNAPSHOT
After Visit Summary   1/22/2018    Miriam Magana    MRN: [id number]           Patient Information     Date Of Birth          1950        Visit Information        Provider Department      1/22/2018 9:45 AM Jerry Guerra MD [facility]        Today's Diagnoses     Primary osteoarthritis of right knee    -  1       Follow-ups after your visit        Your next 10 appointments already scheduled     Feb 08, 2018   Procedure with Malu Morris MD   [facility], [place], [facility] ([facility], [facility])    [address] [id number] [phone]           The [facility] is located on the corner of [address] and [address] on the [facility] [place]. It is easily accessible from virtually any point in the [place] area, via [address] and [address].            Mar 05, 2018 12:30 PM CST   Lab with  LAB    Health Lab ([facility])    [address]  1st Floor  [facility] [id number] [phone]            Mar 05, 2018  1:30 PM CST   (Arrive by 1:15 PM)   Return Liver Transplant with Vaibhav Ureña MD   [facility] Hepatology ([facility])    [address]  [address]  [place] [address] [phone]              Who to contact     Please call your clinic at [phone] to:    Ask questions about your health    Make or cancel appointments    Discuss your medicines    Learn about your test results    Speak to your doctor   If you have compliments or concerns about an experience at your clinic, or if you wish to file a complaint, please contact [facility] Patient Relations at [phone] or email us at [email or web address]         Additional Information About Your Visit        MyChart Information     [email or web address] gives you secure access to your electronic health record. If you see a primary care  "provider, you can also send messages to your care team and make appointments. If you have questions, please call your primary care clinic.  If you do not have a primary care provider, please call [phone] and they will assist you.      [employer] is an electronic gateway that provides easy, online access to your medical records. With [employer], you can request a clinic appointment, read your test results, renew a prescription or communicate with your care team.     To access your existing account, please contact your [facility] or call [phone] for assistance.        Care EveryWhere ID     This is your Care EveryWhere ID. This could be used by other organizations to access your [place] medical records  [id number]        Your Vitals Were     Height BMI (Body Mass Index)                1.638 m (5' 4.5\") 32.45 kg/m2           Blood Pressure from Last 3 Encounters:   09/20/17 148/89   08/28/17 116/78   08/10/17 118/50    Weight from Last 3 Encounters:   01/22/18 87.1 kg (192 lb)   09/20/17 88.5 kg (195 lb)   08/28/17 92.9 kg (204 lb 12.9 oz)              Today, you had the following     No orders found for display         Today's Medication Changes          These changes are accurate as of 1/22/18 11:59 PM.  If you have any questions, ask your nurse or doctor.               These medicines have changed or have updated prescriptions.        Dose/Directions    furosemide 40 MG tablet   Commonly known as:  LASIX   This may have changed:  how much to take   Used for:  Other ascites        Dose:  40 mg   Take 1 tablet (40 mg) by mouth daily   Quantity:  90 tablet   Refills:  3       insulin glargine 100 UNIT/ML injection   Commonly known as:  LANTUS   This may have changed:    - how much to take  - how to take this  - when to take this  - additional instructions   Used for:  Type II diabetes mellitus (H)        Start with 11 units of Lantus at night when you go home. Check your blood " sugars at least twice a day at home and if you have hypoglycemia symptoms; please adjust insulin appropriately   Quantity:  3 mL   Refills:  0       insulin lispro 100 UNIT/ML injection   Commonly known as:  HumaLOG KWIKpen   This may have changed:    - how much to take  - when to take this  - additional instructions   Used for:  Type II diabetes mellitus (H)        Use 2.5 Units/15gm carbs with meals. Sliding scale as needed:   Do Not give Correction Insulin if Pre-Meal BG < 140.  For Pre-Meal  - 189 give 1 unit.  For Pre-Meal  - 239 give 2 units.  For Pre-Meal  - 289 give 3 units.  For Pre-Meal  - 339 give 4 units.  For Pre-Meal - 399 give 5 units.  For Pre-Meal -449 give 6 units For Pre-Meal BG = or > 450 give 7 units.   Quantity:  1 Month   Refills:  0       LORazepam 1 MG tablet   Commonly known as:  ATIVAN   This may have changed:    - how much to take  - when to take this   Used for:  Depressive disorder        Dose:  0.5 mg   Take 0.5 tablets (0.5 mg) by mouth every 6 hours as needed   Quantity:  30 tablet   Refills:  0       pramipexole 0.125 MG tablet   Commonly known as:  MIRAPEX   This may have changed:  how much to take   Used for:  Insomnia        Dose:  0.125-0.25 mg   Take 1-2 tablets (0.125-0.25 mg) by mouth At Bedtime   Quantity:  90 tablet   Refills:  0       traZODone 50 MG tablet   Commonly known as:  DESYREL   This may have changed:  how much to take   Used for:  Insomnia        Dose:   mg   Take 1-2 tablets ( mg) by mouth nightly as needed for sleep   Quantity:  60 tablet   Refills:  0                Primary Care Provider Office Phone # Fax #    Yvon Lynn [phone]       [address] [address]        Equal Access to Services     DOUG HOBBS AH: Mary Florence, waaxda luqadaha, qaybta kaalmada adehima, karrie cope. So [facility] [phone].    ATENCIÓN:  Si habjesse nguyen, tiene a buckner disposición servicios gratuitos de asistencia lingüística. Brian hoskins [phone].    We comply with applicable federal civil rights laws and [place] laws. We do not discriminate on the basis of race, color, national origin, age, disability, sex, sexual orientation, or gender identity.            Thank you!     Thank you for choosing [facility]  for your care. Our goal is always to provide you with excellent care. Hearing back from our patients is one way we can continue to improve our services. Please take a few minutes to complete the written survey that you may receive in the mail after your visit with us. Thank you!             Your Updated Medication List - Protect others around you: Learn how to safely use, store and throw away your medicines at www.disposemymeds.org.          This list is accurate as of 1/22/18 11:59 PM.  Always use your most recent med list.                   Brand Name Dispense Instructions for use Diagnosis    acetaminophen 500 MG tablet    TYLENOL     Take 1,000 mg by mouth 2 times daily as needed for mild pain or fever        ARTIFICIAL TEARS 1.4 % ophthalmic solution   Generic drug:  polyvinyl alcohol      Place 1 drop into both eyes as needed        atorvastatin 40 MG tablet    LIPITOR     TAKE 1 TABLET BY MOUTH ONCE DAILY.        brexpiprazole 0.5 MG tablet    REXULTI     Take 1 mg by mouth daily (with breakfast)        * buPROPion 300 MG 24 hr tablet    WELLBUTRIN XL     Take 450 mg by mouth daily        * buPROPion 150 MG 24 hr tablet    WELLBUTRIN XL     Take 150 mg by mouth every morning with 300mg total dose of 450mg        BusPIRone HCl 30 MG Tabs      Take  by mouth 2 times daily.        calcium citrate-vitamin D 315-250 MG-UNIT Tabs per tablet    CITRACAL     Take 2 tablets by mouth 2 times daily.        cephALEXin 500 MG capsule    KEFLEX     Take 500 mg by mouth 3 times daily        cetirizine 10 MG tablet    zyrTEC     TAKE 1  TABLET BY MOUTH ONCE DAILY.        ferrous gluconate 324 (38 FE) MG tablet    FERGON    90 tablet    Take 1 tablet (324 mg) by mouth 3 times daily (with meals)    Liver replaced by transplant (H)       fluticasone 50 MCG/ACT spray    FLONASE     Spray 1 spray into both nostrils 2 times daily as needed for allergies        furosemide 40 MG tablet    LASIX    90 tablet    Take 1 tablet (40 mg) by mouth daily    Other ascites       gabapentin 300 MG capsule    NEURONTIN    90 capsule    Take 1 capsule (300 mg) by mouth 3 times daily    Shoulder arthritis       insulin glargine 100 UNIT/ML injection    LANTUS    3 mL    Start with 11 units of Lantus at night when you go home. Check your blood sugars at least twice a day at home and if you have hypoglycemia symptoms; please adjust insulin appropriately    Type II diabetes mellitus (H)       insulin lispro 100 UNIT/ML injection    HumaLOG KWIKpen    1 Month    Use 2.5 Units/15gm carbs with meals. Sliding scale as needed:   Do Not give Correction Insulin if Pre-Meal BG < 140.  For Pre-Meal  - 189 give 1 unit.  For Pre-Meal  - 239 give 2 units.  For Pre-Meal  - 289 give 3 units.  For Pre-Meal  - 339 give 4 units.  For Pre-Meal - 399 give 5 units.  For Pre-Meal -449 give 6 units For Pre-Meal BG = or > 450 give 7 units.    Type II diabetes mellitus (H)       LORazepam 1 MG tablet    ATIVAN    30 tablet    Take 0.5 tablets (0.5 mg) by mouth every 6 hours as needed    Depressive disorder       metFORMIN 500 MG tablet    GLUCOPHAGE     750 mg at HS        multivitamin, therapeutic with minerals Tabs tablet      Take 1 tablet by mouth daily        mycophenolate 500 MG tablet     60 tablet    Take 2 tablets (1,000 mg) by mouth 2 times daily    Status post liver transplantation (H)       omeprazole 20 MG CR capsule    priLOSEC    90 capsule    Take 1 capsule (20 mg) by mouth daily    Gastroesophageal reflux disease without esophagitis        pramipexole 0.125 MG tablet    MIRAPEX    90 tablet    Take 1-2 tablets (0.125-0.25 mg) by mouth At Bedtime    Insomnia       predniSONE 5 MG tablet    DELTASONE    30 tablet    Take 1 tablet (5 mg) by mouth daily    Liver replaced by transplant (H)       propranolol 40 MG tablet    INDERAL    60 tablet    Take 1 tablet (40 mg) by mouth 2 times daily    Essential and other specified forms of tremor       senna-docusate 8.6-50 MG per tablet    SENOKOT-S;PERICOLACE    60 tablet    Take 1-2 tablets by mouth 2 times daily as needed for constipation (while taking narcotic pain medications)    Osteoarthritis of left glenohumeral joint       sertraline 100 MG tablet    ZOLOFT     Take 200 mg by mouth daily AM        spironolactone 25 MG tablet    ALDACTONE     Take 25 mg by mouth daily        traZODone 50 MG tablet    DESYREL    60 tablet    Take 1-2 tablets ( mg) by mouth nightly as needed for sleep    Insomnia       ursodiol 300 MG capsule    ACTIGALL    270 capsule    Take 2 caps in a.m. And 1 cap in p.m.    Liver replaced by transplant (H)       vitamin D 2000 UNITS tablet      Take 2 tablets by mouth daily AM        * Notice:  This list has 2 medication(s) that are the same as other medications prescribed for you. Read the directions carefully, and ask your doctor or other care provider to review them with you.

## 2018-01-23 NOTE — TELEPHONE ENCOUNTER
Dada,  OK.  What recommendations do you have for her josue-op management?  Did you want to see her for a pre-op visit?    Annalise,  Please go ahead and schedule a surgical date per Dr. Ureña's note below.        ===View-only below this line===    ----- Message -----     From: Vaibhav Ureña MD     Sent: 1/22/2018   2:29 PM       To: Jerry Guerra MD    No time is likely better than now.  We will help you get her through this.    Dada Niranjan  ----- Message -----     From: Jerry Guerra MD     Sent: 1/22/2018  10:28 AM       To: Vaibhav Ureña MD, SABA Benson,    When would be the optimal time to perform knee replacement in this pt of yours?  She has a # of comorbidities , in particular, her hepatic failure.\    Thanks, Ed

## 2018-01-30 NOTE — TELEPHONE ENCOUNTER
Contacted patient for pre-assessment. Patient states that she has been having her follow up EGDs with [facility] in [place]. She is scheduled in the next month for a repeat to check varices. Requested that patient have her procedure reports sent to medical records so there is a copy in her chart. Patient agreed.    Natalia Kwon RN

## 2018-02-23 NOTE — TELEPHONE ENCOUNTER
Pt has not completed labs every 2-3 months. Pt has last had labs 09.2017. Pt does have an upcoming appt with Dr cordero on 3.5.18.Pt will complete labs prior to appt.     Cannot find record of a colonoscopy. Pt has not seen dermatology at the [facility].

## 2018-03-27 NOTE — LETTER
3/27/2018       RE: Miriam Magana  [id number]  [address]  [facility] [address]     Dear Colleague,    Thank you for referring your patient, Miriam Magana, to the [facility]PATOLOG[facility] at [facility]. Please see a copy of my visit note below.    HISTORY OF PRESENT ILLNESS:  I had the pleasure of seeing Miriam Magana for followup in the Liver Transplantation Clinic at the [facility] on 03/27/2018.  Ms. Magana returns for followup now more than 23 years status post liver transplantation.  Unfortunately, she has redeveloped cirrhosis, most likely on the basis of chronic biliary tract disease.      The new thing is that she has developed worsening ascites and is requiring repeat large volume paracentesis and with that has developed fairly substantial muscle wasting.  She has also had an episode of hepatic encephalopathy as well.      She is complaining of some abdominal discomfort related to increased abdominal girth.  She is being tapped once a week.  She denies any itching or skin rash.  She does complain of fairly marked fatigue.  She denies any lower extremity edema at this visit.  She has not had any fevers or chills.  She does complain of some cough and occasional shortness of breath.  She denies any nausea or vomiting and is having 2-3 bowel movements per day at present.  She has not had any melena or hematochezia and currently she has no overt signs of hepatic encephalopathy.       Current Outpatient Prescriptions   Medication     rifaximin (XIFAXAN) 550 MG TABS tablet     levofloxacin (LEVAQUIN) 750 MG tablet     TRAMADOL HCL PO     LACTULOSE PO     ursodiol (ACTIGALL) 300 MG capsule     spironolactone (ALDACTONE) 25 MG tablet     cephALEXin (KEFLEX) 500 MG capsule     gabapentin (NEURONTIN) 300 MG capsule     ferrous gluconate (FERGON) 324 (38 FE) MG tablet     predniSONE (DELTASONE) 5 MG tablet     furosemide (LASIX) 40 MG tablet     omeprazole  (PRILOSEC) 20 MG CR capsule     senna-docusate (SENOKOT-S;PERICOLACE) 8.6-50 MG per tablet     brexpiprazole (REXULTI) 0.5 MG tablet     fluticasone (FLONASE) 50 MCG/ACT spray     CELLCEPT 500 MG PO TABLET     multivitamin, therapeutic with minerals (THERA-VIT-M) TABS     acetaminophen (TYLENOL) 500 MG tablet     atorvastatin (LIPITOR) 40 MG tablet     cetirizine (ZYRTEC) 10 MG tablet     buPROPion (WELLBUTRIN XL) 150 MG 24 hr tablet     sertraline (ZOLOFT) 100 MG tablet     LORazepam (ATIVAN) 1 MG tablet     traZODone (DESYREL) 50 MG tablet     insulin glargine (LANTUS) 100 UNIT/ML PEN     pramipexole (MIRAPEX) 0.125 MG tablet     propranolol (INDERAL) 40 MG tablet     insulin lispro (HUMALOG KWIKPEN) 100 UNIT/ML soln     buPROPion (WELLBUTRIN XL) 300 MG 24 hr tablet     polyvinyl alcohol (ARTIFICIAL TEARS) 1.4 % ophthalmic solution     BusPIRone HCl 30 MG TABS     calcium citrate-vitamin D (CITRACAL) 315-250 MG-UNIT TABS     metFORMIN (GLUCOPHAGE) 500 MG tablet     Cholecalciferol (VITAMIN D) 2000 UNIT tablet     No current facility-administered medications for this visit.      B/P: 106/69, T: 97.8, P: 87, R: Data Unavailable    In general, she appears chronically ill and with a fairly significant muscle wasting.  HEENT exam shows no scleral icterus but marked temporal muscle wasting.  Chest is clear.  Abdominal exam shows a moderate amount of ascites.  No masses or tenderness to palpation are present.  Liver is 10-11 cm in span with a prominent left lobe.  No spleen tip is palpable, and extremity exam shows no edema.  Skin exam shows some palmar erythema without spider angioma.  Neurologic exam shows no asterixis.       Recent Results (from the past 168 hour(s))   CBC with platelets    Collection Time: 03/27/18  9:20 AM   Result Value Ref Range    WBC 11.4 (H) 4.0 - 11.0 10e9/L    RBC Count 4.51 3.8 - 5.2 10e12/L    Hemoglobin 11.7 11.7 - 15.7 g/dL    Hematocrit 36.1 35.0 - 47.0 %    MCV 80 78 - 100 fl    MCH  25.9 (L) 26.5 - 33.0 pg    MCHC 32.4 31.5 - 36.5 g/dL    RDW 16.5 (H) 10.0 - 15.0 %    Platelet Count 157 150 - 450 10e9/L   Basic metabolic panel    Collection Time: 03/27/18  9:20 AM   Result Value Ref Range    Sodium 132 (L) 133 - 144 mmol/L    Potassium 4.1 3.4 - 5.3 mmol/L    Chloride 98 94 - 109 mmol/L    Carbon Dioxide 24 20 - 32 mmol/L    Anion Gap 10 3 - 14 mmol/L    Glucose 167 (H) 70 - 99 mg/dL    Urea Nitrogen 34 (H) 7 - 30 mg/dL    Creatinine 1.24 (H) 0.52 - 1.04 mg/dL    GFR Estimate 43 (L) >60 mL/min/1.7m2    GFR Estimate If Black 52 (L) >60 mL/min/1.7m2    Calcium 8.5 8.5 - 10.1 mg/dL   Hepatic panel    Collection Time: 03/27/18  9:20 AM   Result Value Ref Range    Bilirubin Direct 0.6 (H) 0.0 - 0.2 mg/dL    Bilirubin Total 1.0 0.2 - 1.3 mg/dL    Albumin 3.2 (L) 3.4 - 5.0 g/dL    Protein Total 6.1 (L) 6.8 - 8.8 g/dL    Alkaline Phosphatase 324 (H) 40 - 150 U/L    ALT 16 0 - 50 U/L    AST 26 0 - 45 U/L      My impression is that Ms. Magana has developed cirrhosis now 23 years status post liver transplantation.  Her MELD score to my calculation is only 16 and I do think we should go forward with a TIPS procedure at this time.        Her liver function is otherwise excellent and I do not think her encephalopathy is significant enough to be considered as a contraindication.  She has experienced a significant amount of muscle wasting and so I would like to see it done sooner rather than later.  I do think her renal function will allow us to push the diuretics any harder.  I think we will send her to Interventional Radiology and have a conversation with them beforehand.      Thank you very much for allowing me to participate in the care of this patient.  If you have any questions regarding my recommendations, please do not hesitate to contact me.       Vaibhav Ureña MD      Professor of Medicine  [employer]      Executive Medical Director, Solid Organ Transplant  Program  [facility]

## 2018-03-27 NOTE — MR AVS SNAPSHOT
After Visit Summary   3/27/2018    Miriam Magana    MRN: [id number]           Patient Information     Date Of Birth          1950        Visit Information        Provider Department      3/27/2018 9:00 AM Vaibhav Ureña MD [facility] Hepatology        Today's Diagnoses     Cirrhosis of liver with ascites, unspecified hepatic cirrhosis type (H)    -  1       Follow-ups after your visit        Your next 10 appointments already scheduled     Sep 11, 2018  9:30 AM CDT   Lab with  LAB   [facility] Lab ([facility])    [address]  1st Floor  [facility] [place] [facility] [id number] [phone]            Sep 11, 2018 10:00 AM CDT   (Arrive by 9:45 AM)   Return Liver Transplant with Vaibhav Ureña MD   [facility] Hepatology ([facility])    [address]  [address]  [place] [id number] [phone]              Future tests that were ordered for you today     Open Future Orders        Priority Expected Expires Ordered    IR Transven Intrahepatic Portosyst Shunt Routine  3/27/2019 3/27/2018            Who to contact     If you have questions or need follow up information about today's clinic visit or your schedule please contact [facility] HEPATOLOGY directly at [phone].  Normal or non-critical lab and imaging results will be communicated to you by MyChart, letter or phone within 4 business days after the clinic has received the results. If you do not hear from us within 7 days, please contact the clinic through OneTokhart or phone. If you have a critical or abnormal lab result, we will notify you by phone as soon as possible.  Submit refill requests through Yatra or call your pharmacy and they will forward the refill request to us. Please allow 3 business days for your refill to be completed.          Additional Information About Your Visit        [email or web address] Information     Yatra gives you secure access to your electronic health record. If you see  "a primary care provider, you can also send messages to your care team and make appointments. If you have questions, please call your primary care clinic.  If you do not have a primary care provider, please call [phone] and they will assist you.        Care EveryWhere ID     This is your Care EveryWhere ID. This could be used by other organizations to access your [facility] medical records  [id number]        Your Vitals Were     Pulse Temperature Height Pulse Oximetry BMI (Body Mass Index)       87 97.8  F (36.6  C) (Oral) 1.651 m (5' 5\") 97% 29.12 kg/m2        Blood Pressure from Last 3 Encounters:   03/27/18 106/69   09/20/17 148/89   08/28/17 116/78    Weight from Last 3 Encounters:   03/27/18 79.4 kg (175 lb)   01/22/18 87.1 kg (192 lb)   09/20/17 88.5 kg (195 lb)                 Today's Medication Changes          These changes are accurate as of 3/27/18 10:41 AM.  If you have any questions, ask your nurse or doctor.               These medicines have changed or have updated prescriptions.        Dose/Directions    furosemide 40 MG tablet   Commonly known as:  LASIX   This may have changed:  how much to take   Used for:  Other ascites        Dose:  40 mg   Take 1 tablet (40 mg) by mouth daily   Quantity:  90 tablet   Refills:  3       insulin glargine 100 UNIT/ML injection   Commonly known as:  LANTUS   This may have changed:    - how much to take  - how to take this  - when to take this  - additional instructions   Used for:  Type II diabetes mellitus (H)        Start with 11 units of Lantus at night when you go home. Check your blood sugars at least twice a day at home and if you have hypoglycemia symptoms; please adjust insulin appropriately   Quantity:  3 mL   Refills:  0       insulin lispro 100 UNIT/ML injection   Commonly known as:  HumaLOG KWIKpen   This may have changed:    - how much to take  - when to take this  - additional instructions   Used for:  Type II diabetes mellitus (H)        Use 2.5 " Units/15gm carbs with meals. Sliding scale as needed:   Do Not give Correction Insulin if Pre-Meal BG < 140.  For Pre-Meal  - 189 give 1 unit.  For Pre-Meal  - 239 give 2 units.  For Pre-Meal  - 289 give 3 units.  For Pre-Meal  - 339 give 4 units.  For Pre-Meal - 399 give 5 units.  For Pre-Meal -449 give 6 units For Pre-Meal BG = or > 450 give 7 units.   Quantity:  1 Month   Refills:  0       LORazepam 1 MG tablet   Commonly known as:  ATIVAN   This may have changed:    - how much to take  - when to take this   Used for:  Depressive disorder        Dose:  0.5 mg   Take 0.5 tablets (0.5 mg) by mouth every 6 hours as needed   Quantity:  30 tablet   Refills:  0       pramipexole 0.125 MG tablet   Commonly known as:  MIRAPEX   This may have changed:  how much to take   Used for:  Insomnia        Dose:  0.125-0.25 mg   Take 1-2 tablets (0.125-0.25 mg) by mouth At Bedtime   Quantity:  90 tablet   Refills:  0       propranolol 40 MG tablet   Commonly known as:  INDERAL   This may have changed:  how much to take   Used for:  Essential and other specified forms of tremor        Dose:  40 mg   Take 1 tablet (40 mg) by mouth 2 times daily   Quantity:  60 tablet   Refills:  0       traZODone 50 MG tablet   Commonly known as:  DESYREL   This may have changed:  how much to take   Used for:  Insomnia        Dose:   mg   Take 1-2 tablets ( mg) by mouth nightly as needed for sleep   Quantity:  60 tablet   Refills:  0                Primary Care Provider Office Phone # Fax #    Yvon Lynn [phone]       [address] [address]        Equal Access to Services     DOUG HOBBS AH: Mary Florence, lisa luna, jon gonsales, karrie cope. So [facility] [phone].    ATENCIÓN: Si habla español, tiene a buckner disposición servicios gratuitos de asistencia lingüística. Llame al  [phone].    We comply with applicable federal civil rights laws and [place] laws. We do not discriminate on the basis of race, color, national origin, age, disability, sex, sexual orientation, or gender identity.            Thank you!     Thank you for choosing [facility] HEPATOLOGY  for your care. Our goal is always to provide you with excellent care. Hearing back from our patients is one way we can continue to improve our services. Please take a few minutes to complete the written survey that you may receive in the mail after your visit with us. Thank you!             Your Updated Medication List - Protect others around you: Learn how to safely use, store and throw away your medicines at www.disposemymeds.org.          This list is accurate as of 3/27/18 10:41 AM.  Always use your most recent med list.                   Brand Name Dispense Instructions for use Diagnosis    acetaminophen 500 MG tablet    TYLENOL     Take 1,000 mg by mouth 2 times daily as needed for mild pain or fever        ARTIFICIAL TEARS 1.4 % ophthalmic solution   Generic drug:  polyvinyl alcohol      Place 1 drop into both eyes as needed        atorvastatin 40 MG tablet    LIPITOR     TAKE 1 TABLET BY MOUTH ONCE DAILY.        brexpiprazole 0.5 MG tablet    REXULTI     Take 1 mg by mouth daily (with breakfast)        * buPROPion 300 MG 24 hr tablet    WELLBUTRIN XL     Take 450 mg by mouth daily        * buPROPion 150 MG 24 hr tablet    WELLBUTRIN XL     Take 150 mg by mouth every morning with 300mg total dose of 450mg        BusPIRone HCl 30 MG Tabs      Take  by mouth 2 times daily.        calcium citrate-vitamin D 315-250 MG-UNIT Tabs per tablet    CITRACAL     Take 2 tablets by mouth 2 times daily.        cephALEXin 500 MG capsule    KEFLEX     Take 500 mg by mouth 3 times daily        cetirizine 10 MG tablet    zyrTEC     TAKE 1 TABLET BY MOUTH ONCE DAILY.        ferrous gluconate 324 (38 FE) MG tablet    FERGON    90 tablet    Take  1 tablet (324 mg) by mouth 3 times daily (with meals)    Liver replaced by transplant (H)       fluticasone 50 MCG/ACT spray    FLONASE     Spray 1 spray into both nostrils 2 times daily as needed for allergies        furosemide 40 MG tablet    LASIX    90 tablet    Take 1 tablet (40 mg) by mouth daily    Other ascites       gabapentin 300 MG capsule    NEURONTIN    90 capsule    Take 1 capsule (300 mg) by mouth 3 times daily    Shoulder arthritis       insulin glargine 100 UNIT/ML injection    LANTUS    3 mL    Start with 11 units of Lantus at night when you go home. Check your blood sugars at least twice a day at home and if you have hypoglycemia symptoms; please adjust insulin appropriately    Type II diabetes mellitus (H)       insulin lispro 100 UNIT/ML injection    HumaLOG KWIKpen    1 Month    Use 2.5 Units/15gm carbs with meals. Sliding scale as needed:   Do Not give Correction Insulin if Pre-Meal BG < 140.  For Pre-Meal  - 189 give 1 unit.  For Pre-Meal  - 239 give 2 units.  For Pre-Meal  - 289 give 3 units.  For Pre-Meal  - 339 give 4 units.  For Pre-Meal - 399 give 5 units.  For Pre-Meal -449 give 6 units For Pre-Meal BG = or > 450 give 7 units.    Type II diabetes mellitus (H)       LACTULOSE PO      Take 10 g by mouth 3 times daily        levofloxacin 750 MG tablet    LEVAQUIN     Take 750 mg by mouth daily        LORazepam 1 MG tablet    ATIVAN    30 tablet    Take 0.5 tablets (0.5 mg) by mouth every 6 hours as needed    Depressive disorder       metFORMIN 500 MG tablet    GLUCOPHAGE     750 mg at HS        multivitamin, therapeutic with minerals Tabs tablet      Take 1 tablet by mouth daily        mycophenolate 500 MG tablet     60 tablet    Take 2 tablets (1,000 mg) by mouth 2 times daily    Status post liver transplantation (H)       omeprazole 20 MG CR capsule    priLOSEC    90 capsule    Take 1 capsule (20 mg) by mouth daily    Gastroesophageal reflux disease  without esophagitis       pramipexole 0.125 MG tablet    MIRAPEX    90 tablet    Take 1-2 tablets (0.125-0.25 mg) by mouth At Bedtime    Insomnia       predniSONE 5 MG tablet    DELTASONE    30 tablet    Take 1 tablet (5 mg) by mouth daily    Liver replaced by transplant (H)       propranolol 40 MG tablet    INDERAL    60 tablet    Take 1 tablet (40 mg) by mouth 2 times daily    Essential and other specified forms of tremor       rifaximin 550 MG Tabs tablet    XIFAXAN     Take 550 mg by mouth 2 times daily        senna-docusate 8.6-50 MG per tablet    SENOKOT-S;PERICOLACE    60 tablet    Take 1-2 tablets by mouth 2 times daily as needed for constipation (while taking narcotic pain medications)    Osteoarthritis of left glenohumeral joint       sertraline 100 MG tablet    ZOLOFT     Take 200 mg by mouth daily AM        spironolactone 25 MG tablet    ALDACTONE     Take 25 mg by mouth daily        TRAMADOL HCL PO      Take 25 mg by mouth every 6 hours as needed for moderate to severe pain        traZODone 50 MG tablet    DESYREL    60 tablet    Take 1-2 tablets ( mg) by mouth nightly as needed for sleep    Insomnia       ursodiol 300 MG capsule    ACTIGALL    270 capsule    Take 2 caps in a.m. And 1 cap in p.m.    Liver replaced by transplant (H)       vitamin D 2000 UNITS tablet      Take 2 tablets by mouth daily AM        * Notice:  This list has 2 medication(s) that are the same as other medications prescribed for you. Read the directions carefully, and ask your doctor or other care provider to review them with you.

## 2018-03-27 NOTE — LETTER
3/27/2018      RE: Miriam Magana  [id number]  [address]  [facility] [id number]       HISTORY OF PRESENT ILLNESS:  I had the pleasure of seeing Miriam Magana for followup in the Liver Transplantation Clinic at the [facility] on 03/27/2018.  Ms. Magana returns for followup now more than 23 years status post liver transplantation.  Unfortunately, she has redeveloped cirrhosis, most likely on the basis of chronic biliary tract disease.      The new thing is that she has developed worsening ascites and is requiring repeat large volume paracentesis and with that has developed fairly substantial muscle wasting.  She has also had an episode of hepatic encephalopathy as well.      She is complaining of some abdominal discomfort related to increased abdominal girth.  She is being tapped once a week.  She denies any itching or skin rash.  She does complain of fairly marked fatigue.  She denies any lower extremity edema at this visit.  She has not had any fevers or chills.  She does complain of some cough and occasional shortness of breath.  She denies any nausea or vomiting and is having 2-3 bowel movements per day at present.  She has not had any melena or hematochezia and currently she has no overt signs of hepatic encephalopathy.       Current Outpatient Prescriptions   Medication     rifaximin (XIFAXAN) 550 MG TABS tablet     levofloxacin (LEVAQUIN) 750 MG tablet     TRAMADOL HCL PO     LACTULOSE PO     ursodiol (ACTIGALL) 300 MG capsule     spironolactone (ALDACTONE) 25 MG tablet     cephALEXin (KEFLEX) 500 MG capsule     gabapentin (NEURONTIN) 300 MG capsule     ferrous gluconate (FERGON) 324 (38 FE) MG tablet     predniSONE (DELTASONE) 5 MG tablet     furosemide (LASIX) 40 MG tablet     omeprazole (PRILOSEC) 20 MG CR capsule     senna-docusate (SENOKOT-S;PERICOLACE) 8.6-50 MG per tablet     brexpiprazole (REXULTI) 0.5 MG tablet     fluticasone (FLONASE) 50 MCG/ACT spray     CELLCEPT 500 MG PO TABLET      multivitamin, therapeutic with minerals (THERA-VIT-M) TABS     acetaminophen (TYLENOL) 500 MG tablet     atorvastatin (LIPITOR) 40 MG tablet     cetirizine (ZYRTEC) 10 MG tablet     buPROPion (WELLBUTRIN XL) 150 MG 24 hr tablet     sertraline (ZOLOFT) 100 MG tablet     LORazepam (ATIVAN) 1 MG tablet     traZODone (DESYREL) 50 MG tablet     insulin glargine (LANTUS) 100 UNIT/ML PEN     pramipexole (MIRAPEX) 0.125 MG tablet     propranolol (INDERAL) 40 MG tablet     insulin lispro (HUMALOG KWIKPEN) 100 UNIT/ML soln     buPROPion (WELLBUTRIN XL) 300 MG 24 hr tablet     polyvinyl alcohol (ARTIFICIAL TEARS) 1.4 % ophthalmic solution     BusPIRone HCl 30 MG TABS     calcium citrate-vitamin D (CITRACAL) 315-250 MG-UNIT TABS     metFORMIN (GLUCOPHAGE) 500 MG tablet     Cholecalciferol (VITAMIN D) 2000 UNIT tablet     No current facility-administered medications for this visit.      B/P: 106/69, T: 97.8, P: 87, R: Data Unavailable    In general, she appears chronically ill and with a fairly significant muscle wasting.  HEENT exam shows no scleral icterus but marked temporal muscle wasting.  Chest is clear.  Abdominal exam shows a moderate amount of ascites.  No masses or tenderness to palpation are present.  Liver is 10-11 cm in span with a prominent left lobe.  No spleen tip is palpable, and extremity exam shows no edema.  Skin exam shows some palmar erythema without spider angioma.  Neurologic exam shows no asterixis.       Recent Results (from the past 168 hour(s))   CBC with platelets    Collection Time: 03/27/18  9:20 AM   Result Value Ref Range    WBC 11.4 (H) 4.0 - 11.0 10e9/L    RBC Count 4.51 3.8 - 5.2 10e12/L    Hemoglobin 11.7 11.7 - 15.7 g/dL    Hematocrit 36.1 35.0 - 47.0 %    MCV 80 78 - 100 fl    MCH 25.9 (L) 26.5 - 33.0 pg    MCHC 32.4 31.5 - 36.5 g/dL    RDW 16.5 (H) 10.0 - 15.0 %    Platelet Count 157 150 - 450 10e9/L   Basic metabolic panel    Collection Time: 03/27/18  9:20 AM   Result Value Ref Range     Sodium 132 (L) 133 - 144 mmol/L    Potassium 4.1 3.4 - 5.3 mmol/L    Chloride 98 94 - 109 mmol/L    Carbon Dioxide 24 20 - 32 mmol/L    Anion Gap 10 3 - 14 mmol/L    Glucose 167 (H) 70 - 99 mg/dL    Urea Nitrogen 34 (H) 7 - 30 mg/dL    Creatinine 1.24 (H) 0.52 - 1.04 mg/dL    GFR Estimate 43 (L) >60 mL/min/1.7m2    GFR Estimate If Black 52 (L) >60 mL/min/1.7m2    Calcium 8.5 8.5 - 10.1 mg/dL   Hepatic panel    Collection Time: 03/27/18  9:20 AM   Result Value Ref Range    Bilirubin Direct 0.6 (H) 0.0 - 0.2 mg/dL    Bilirubin Total 1.0 0.2 - 1.3 mg/dL    Albumin 3.2 (L) 3.4 - 5.0 g/dL    Protein Total 6.1 (L) 6.8 - 8.8 g/dL    Alkaline Phosphatase 324 (H) 40 - 150 U/L    ALT 16 0 - 50 U/L    AST 26 0 - 45 U/L      My impression is that Ms. Magana has developed cirrhosis now 23 years status post liver transplantation.  Her MELD score to my calculation is only 16 and I do think we should go forward with a TIPS procedure at this time.        Her liver function is otherwise excellent and I do not think her encephalopathy is significant enough to be considered as a contraindication.  She has experienced a significant amount of muscle wasting and so I would like to see it done sooner rather than later.  I do think her renal function will allow us to push the diuretics any harder.  I think we will send her to Interventional Radiology and have a conversation with them beforehand.      Thank you very much for allowing me to participate in the care of this patient.  If you have any questions regarding my recommendations, please do not hesitate to contact me.       Vaibhav Ureña MD      Professor of Medicine  [employer]      Executive Medical Director, Solid Organ Transplant Program  [facility]

## 2018-03-30 NOTE — PROGRESS NOTES
HISTORY OF PRESENT ILLNESS:  I had the pleasure of seeing Miriam Magana for followup in the Liver Transplantation Clinic at the [facility] on 03/27/2018.  Ms. Magana returns for followup now more than 23 years status post liver transplantation.  Unfortunately, she has redeveloped cirrhosis, most likely on the basis of chronic biliary tract disease.      The new thing is that she has developed worsening ascites and is requiring repeat large volume paracentesis and with that has developed fairly substantial muscle wasting.  She has also had an episode of hepatic encephalopathy as well.      She is complaining of some abdominal discomfort related to increased abdominal girth.  She is being tapped once a week.  She denies any itching or skin rash.  She does complain of fairly marked fatigue.  She denies any lower extremity edema at this visit.  She has not had any fevers or chills.  She does complain of some cough and occasional shortness of breath.  She denies any nausea or vomiting and is having 2-3 bowel movements per day at present.  She has not had any melena or hematochezia and currently she has no overt signs of hepatic encephalopathy.       Current Outpatient Prescriptions   Medication     rifaximin (XIFAXAN) 550 MG TABS tablet     levofloxacin (LEVAQUIN) 750 MG tablet     TRAMADOL HCL PO     LACTULOSE PO     ursodiol (ACTIGALL) 300 MG capsule     spironolactone (ALDACTONE) 25 MG tablet     cephALEXin (KEFLEX) 500 MG capsule     gabapentin (NEURONTIN) 300 MG capsule     ferrous gluconate (FERGON) 324 (38 FE) MG tablet     predniSONE (DELTASONE) 5 MG tablet     furosemide (LASIX) 40 MG tablet     omeprazole (PRILOSEC) 20 MG CR capsule     senna-docusate (SENOKOT-S;PERICOLACE) 8.6-50 MG per tablet     brexpiprazole (REXULTI) 0.5 MG tablet     fluticasone (FLONASE) 50 MCG/ACT spray     CELLCEPT 500 MG PO TABLET     multivitamin, therapeutic with minerals (THERA-VIT-M) TABS      acetaminophen (TYLENOL) 500 MG tablet     atorvastatin (LIPITOR) 40 MG tablet     cetirizine (ZYRTEC) 10 MG tablet     buPROPion (WELLBUTRIN XL) 150 MG 24 hr tablet     sertraline (ZOLOFT) 100 MG tablet     LORazepam (ATIVAN) 1 MG tablet     traZODone (DESYREL) 50 MG tablet     insulin glargine (LANTUS) 100 UNIT/ML PEN     pramipexole (MIRAPEX) 0.125 MG tablet     propranolol (INDERAL) 40 MG tablet     insulin lispro (HUMALOG KWIKPEN) 100 UNIT/ML soln     buPROPion (WELLBUTRIN XL) 300 MG 24 hr tablet     polyvinyl alcohol (ARTIFICIAL TEARS) 1.4 % ophthalmic solution     BusPIRone HCl 30 MG TABS     calcium citrate-vitamin D (CITRACAL) 315-250 MG-UNIT TABS     metFORMIN (GLUCOPHAGE) 500 MG tablet     Cholecalciferol (VITAMIN D) 2000 UNIT tablet     No current facility-administered medications for this visit.      B/P: 106/69, T: 97.8, P: 87, R: Data Unavailable    In general, she appears chronically ill and with a fairly significant muscle wasting.  HEENT exam shows no scleral icterus but marked temporal muscle wasting.  Chest is clear.  Abdominal exam shows a moderate amount of ascites.  No masses or tenderness to palpation are present.  Liver is 10-11 cm in span with a prominent left lobe.  No spleen tip is palpable, and extremity exam shows no edema.  Skin exam shows some palmar erythema without spider angioma.  Neurologic exam shows no asterixis.       Recent Results (from the past 168 hour(s))   CBC with platelets    Collection Time: 03/27/18  9:20 AM   Result Value Ref Range    WBC 11.4 (H) 4.0 - 11.0 10e9/L    RBC Count 4.51 3.8 - 5.2 10e12/L    Hemoglobin 11.7 11.7 - 15.7 g/dL    Hematocrit 36.1 35.0 - 47.0 %    MCV 80 78 - 100 fl    MCH 25.9 (L) 26.5 - 33.0 pg    MCHC 32.4 31.5 - 36.5 g/dL    RDW 16.5 (H) 10.0 - 15.0 %    Platelet Count 157 150 - 450 10e9/L   Basic metabolic panel    Collection Time: 03/27/18  9:20 AM   Result Value Ref Range    Sodium 132 (L) 133 - 144 mmol/L    Potassium 4.1 3.4 - 5.3  mmol/L    Chloride 98 94 - 109 mmol/L    Carbon Dioxide 24 20 - 32 mmol/L    Anion Gap 10 3 - 14 mmol/L    Glucose 167 (H) 70 - 99 mg/dL    Urea Nitrogen 34 (H) 7 - 30 mg/dL    Creatinine 1.24 (H) 0.52 - 1.04 mg/dL    GFR Estimate 43 (L) >60 mL/min/1.7m2    GFR Estimate If Black 52 (L) >60 mL/min/1.7m2    Calcium 8.5 8.5 - 10.1 mg/dL   Hepatic panel    Collection Time: 03/27/18  9:20 AM   Result Value Ref Range    Bilirubin Direct 0.6 (H) 0.0 - 0.2 mg/dL    Bilirubin Total 1.0 0.2 - 1.3 mg/dL    Albumin 3.2 (L) 3.4 - 5.0 g/dL    Protein Total 6.1 (L) 6.8 - 8.8 g/dL    Alkaline Phosphatase 324 (H) 40 - 150 U/L    ALT 16 0 - 50 U/L    AST 26 0 - 45 U/L      My impression is that Ms. Magana has developed cirrhosis now 23 years status post liver transplantation.  Her MELD score to my calculation is only 16 and I do think we should go forward with a TIPS procedure at this time.        Her liver function is otherwise excellent and I do not think her encephalopathy is significant enough to be considered as a contraindication.  She has experienced a significant amount of muscle wasting and so I would like to see it done sooner rather than later.  I do think her renal function will allow us to push the diuretics any harder.  I think we will send her to Interventional Radiology and have a conversation with them beforehand.      Thank you very much for allowing me to participate in the care of this patient.  If you have any questions regarding my recommendations, please do not hesitate to contact me.       Vaibhav Ureña MD      Professor of Medicine  [employer]      Executive Medical Director, Solid Organ Transplant Program  [facility]

## 2018-04-06 NOTE — TELEPHONE ENCOUNTER
Patients spouse calling at this time.   Patient has been admitted in to [facility].   Spouse says that patient will be there permanently. Patients PCP Dr Pineda is asking for Dr. Ureña to call them to consult about patients history and upcoming appts or tests.   Dr. Lynn can be reached at [phone].  Patient admitted on April 3, 2018.   Questions concerning this message can be called to patients spouse at [phone].  Thanks   Harriett Sparks LPN

## 2018-04-11 PROBLEM — K74.60 CIRRHOSIS OF LIVER WITH ASCITES (H): Status: ACTIVE | Noted: 2018-01-01

## 2018-04-11 PROBLEM — R18.8 CIRRHOSIS OF LIVER WITH ASCITES (H): Status: ACTIVE | Noted: 2018-01-01

## 2018-04-11 NOTE — TELEPHONE ENCOUNTER
Called  and informed her that we have pt scheduled for her TIPS procedure.     I started to inform him of dates/times and when asked about distance, he states that he will have to be the one to bring her and they live about 4 hours away.     I offered if another option would work such that  We see them for consult on Tuesday with Dr. Gill and then schedule them for procedure next day or would he prefer that they come back another time for procedure.     He states that he would prefer to do back to back appts and they will stay overnight at hot.     I informed him that I will see what I can do.       *CALLED PT back and informed him of the following.:    Tuesday: 5/1:   10am ; consult with Jairo  1045am: US, NPO 8hours  120pm: CT    2pm: PAC      Weds 5/2   TIPS procedure.     I will give them more information when I see them in Tuesday.     *Dr Ureña team notified.     Thanks, Natalia

## 2018-04-11 NOTE — TELEPHONE ENCOUNTER
Called and spoke to   Informed him that I did review pt's chart and that we have been requested to consult on pt regarding TIPS procedure.   Informed him that I do see that pt is currently in a nursing home and that I can contact them, however wanted to see if I should be contacting him or the Nursing home.     He states that he's not sure if he can bring her. He also states that he can call the nursing home to inquire as well. I informed him that I do have her scheduled for next week with Dr. Gill. I would like to know who I can call to inform them of these appt details.     He states that he'll call the Nursing home and then call me back.     Natalia Merchant RN, BSN  Interventional Radiology Nurse Coordinator   Phone: [phone]

## 2018-04-11 NOTE — TELEPHONE ENCOUNTER
Communicated to Dez Erickson in [place], [place]     Spoke to SABA Rolle in regard to pt's upcoming appts.   Inquired as to when her last para appt was In which she wasn't sure.     I informed her of pt's upcoming appts on 5/1 and 5/2. She states that's she's not sure if pt would be a candidate.  I informed her that we will have her see our PAC clinic to further assess.    She states that they just took her oxygen  Yesterday as before pt was on 2-4 liters to keep it above 88%.   She was not able to find if the patient has had a para since admission.     I informed Sariah that she does have a US scan at 1045am therefore must be NPO except morning meds until after her CT scan which ends at 2pm.     Once completed, we will fax all med recs to them at [phone].     Natalia Merchant RN, BSN  Interventional Radiology Nurse Coordinator   Phone: [phone]

## 2018-05-01 NOTE — NURSING NOTE
"Oncology Rooming Note    May 1, 2018 12:53 PM   Miriam Magana is a 68 year old female who presents for:    Chief Complaint   Patient presents with     Oncology Clinic Visit     Return for CT, Ultrasound results      Initial Vitals: /74  Pulse 102  Temp 97.5  F (36.4  C) (Oral)  Resp 16  Wt 83 kg (182 lb 15.7 oz)  SpO2 99%  BMI 30.45 kg/m2 Estimated body mass index is 30.45 kg/(m^2) as calculated from the following:    Height as of 3/27/18: 1.651 m (5' 5\").    Weight as of this encounter: 83 kg (182 lb 15.7 oz). Body surface area is 1.95 meters squared.  Moderate Pain (5) Comment: Data Unavailable   No LMP recorded. Patient is postmenopausal.  Allergies reviewed: Yes  Medications reviewed: Yes    Medications: Medication refills not needed today.  Pharmacy name entered into [facility]:    [facility] - [place], [place] - [address] [place] [address] [id number] - [facility] [place] [facility] [address] [facility]    Clinical concerns: results  Jairo  was notified.    10  minutes for nursing intake (face to face time)     Alyssa Rubin MA              "

## 2018-05-01 NOTE — LETTER
5/1/2018       RE: Miriam Magana  [id number]  [facility] [address]     Dear Colleague,    Thank you for referring your patient, Miriam Magana, to the [facility] INTERVENTIONAL RADIOLOGY at [facility]. Please see a copy of my visit note below.    Miriam Magana is referred by Dr Ureña for evaluation for TIPS pl;acement. Ms. Magana is almost 23 years post liver transplantation. 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  She has a recent worsening of her ascites requiring repeated paracentesis.   Clinically, she is very tired. Has lots of muscle waisting and can't walk much. She has significant ascites and lower extremity swelling. She has nit have any new episode of encephalopathy but is using Lactulose 3 times a day.     I looked at her CT demonstrating enlarged bile ducts.  Previous labs were good but I need new l\bs that we will draw today.    Lab Results   Component Value Date    AST 26 03/27/2018     Lab Results   Component Value Date    ALT 16 03/27/2018     Lab Results   Component Value Date    BILICONJ 0.0 04/25/2014      Lab Results   Component Value Date    BILITOTAL 1.0 03/27/2018     Lab Results   Component Value Date    ALBUMIN 3.2 03/27/2018     Lab Results   Component Value Date    PROTTOTAL 6.1 03/27/2018      Lab Results   Component Value Date    ALKPHOS 324 03/27/2018     Past Medical History:   Diagnosis Date     Cirrhosis, biliary (H)     s/p Liver transplant in 1995     CKD (chronic kidney disease)      Depression      Diabetes type 2, controlled (H)      Esophagitis      Fibromyalgia      History of blood transfusion      Hypertension      Memory impairment      MALIK (obstructive sleep apnea)     CPAP     Other chronic pain     bilateral shoulder     Secondary adrenal insufficiency (H)      Urinary incontinence        Past Surgical History:   Procedure Laterality Date     C section X 2       ENDOSCOPIC RETROGRADE  CHOLANGIOPANCREATOGRAM N/A 10/2/2014    Procedure: ENDOSCOPIC RETROGRADE CHOLANGIOPANCREATOGRAM;  Surgeon: Fernando West MD;  [facility]     ENDOSCOPIC RETROGRADE CHOLANGIOPANCREATOGRAM N/A 2016    Procedure: ENDOSCOPIC RETROGRADE CHOLANGIOPANCREATOGRAM;  Surgeon: Fernando West MD;  [facility]     ENDOSCOPIC RETROGRADE CHOLANGIOPANCREATOGRAM N/A 2017    Procedure: COMBINED ENDOSCOPIC RETROGRADE CHOLANGIOPANCREATOGRAPHY, PLACE TUBE/STENT;  Endoscopic Retrograde Cholangiopancreatogram with Biliary Stone Removal, Biliary Duct Dilation, Biliary Duct Stent Placement X2;  Surgeon: Fernando West MD;  [facility]     ENDOSCOPIC RETROGRADE CHOLANGIOPANCREATOGRAPHY, EXCHANGE TUBE/STENT N/A 2017    Procedure: ENDOSCOPIC RETROGRADE CHOLANGIOPANCREATOGRAPHY, EXCHANGE TUBE/STENT;  Endoscopic Retrograde Cholangiopancreatogram with Stone Removal and Stent Exchange;  Surgeon: Fernando West MD;  [facility]     ESOPHAGOSCOPY, GASTROSCOPY, DUODENOSCOPY (EGD), COMBINED N/A 10/2/2014    Procedure: COMBINED ESOPHAGOSCOPY, GASTROSCOPY, DUODENOSCOPY (EGD);  Surgeon: Fernando West MD;  [facility]     ESOPHAGOSCOPY, GASTROSCOPY, DUODENOSCOPY (EGD), COMBINED N/A 8/10/2017    Procedure: COMBINED ESOPHAGOSCOPY, GASTROSCOPY, DUODENOSCOPY (EGD);  EGD;  Surgeon: Malu Morris MD;  Location: [facility]     REVERSE ARTHROPLASTY SHOULDER Left 2016    Procedure: REVERSE ARTHROPLASTY SHOULDER;  Surgeon: Sue Holcomb MD;  [facility]: [facility]     TRANSPLANT LIVER RECIPIENT  DONOR         Family History   Problem Relation Age of Onset     Dementia Father      Dementia Mother      Heart Failure Mother      Myocardial Infarction Mother      Hypertension Mother      DIABETES Brother      CANCER Sister        Social History   Substance Use Topics     Smoking status: Former Smoker     Types: Cigarettes     Quit date: 1991     Smokeless  tobacco: Never Used      Comment: smoked for 15 years     Alcohol use No     Impression and plan:    A 68 year old patient with worsening ascites and muscle waisting with a long term history of biliary dilation. She is a good candidate for TIPS however I am concerned of high risk of Biliary sepsis post TIPS. I have explained the technique to the patient and her  and reviewed the risks and benefits. They are ready to proceed. I told that I will discuss with Dr Ureña prior to go forward. They agreed with the plan.    Again, thank you for allowing me to participate in the care of your patient.      Sincerely,    Jocelyn Gill MD

## 2018-05-01 NOTE — PROGRESS NOTES
Miriam Magana is referred by Dr Ureña for evaluation for TIPS pl;acement. Ms. Magana is almost 23 years post liver transplantation. 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  She has a recent worsening of her ascites requiring repeated paracentesis.   Clinically, she is very tired. Has lots of muscle waisting and can't walk much. She has significant ascites and lower extremity swelling. She has nit have any new episode of encephalopathy but is using Lactulose 3 times a day.     I looked at her CT demonstrating enlarged bile ducts.  Previous labs were good but I need new labs that we will draw today.    Lab Results   Component Value Date    AST 26 03/27/2018     Lab Results   Component Value Date    ALT 16 03/27/2018     Lab Results   Component Value Date    BILICONJ 0.0 04/25/2014      Lab Results   Component Value Date    BILITOTAL 1.0 03/27/2018     Lab Results   Component Value Date    ALBUMIN 3.2 03/27/2018     Lab Results   Component Value Date    PROTTOTAL 6.1 03/27/2018      Lab Results   Component Value Date    ALKPHOS 324 03/27/2018     Past Medical History:   Diagnosis Date     Cirrhosis, biliary (H)     s/p Liver transplant in 1995     CKD (chronic kidney disease)      Depression      Diabetes type 2, controlled (H)      Esophagitis      Fibromyalgia      History of blood transfusion      Hypertension      Memory impairment      MALIK (obstructive sleep apnea)     CPAP     Other chronic pain     bilateral shoulder     Secondary adrenal insufficiency (H)      Urinary incontinence        Past Surgical History:   Procedure Laterality Date     C section X 2       ENDOSCOPIC RETROGRADE CHOLANGIOPANCREATOGRAM N/A 10/2/2014    Procedure: ENDOSCOPIC RETROGRADE CHOLANGIOPANCREATOGRAM;  Surgeon: Fernando West MD;  [facility]     ENDOSCOPIC RETROGRADE CHOLANGIOPANCREATOGRAM N/A 9/14/2016    Procedure: ENDOSCOPIC RETROGRADE CHOLANGIOPANCREATOGRAM;  Surgeon: Fernando West  MD Dong;  [facility]     ENDOSCOPIC RETROGRADE CHOLANGIOPANCREATOGRAM N/A 2017    Procedure: COMBINED ENDOSCOPIC RETROGRADE CHOLANGIOPANCREATOGRAPHY, PLACE TUBE/STENT;  Endoscopic Retrograde Cholangiopancreatogram with Biliary Stone Removal, Biliary Duct Dilation, Biliary Duct Stent Placement X2;  Surgeon: Fernando West MD;  [facility]     ENDOSCOPIC RETROGRADE CHOLANGIOPANCREATOGRAPHY, EXCHANGE TUBE/STENT N/A 2017    Procedure: ENDOSCOPIC RETROGRADE CHOLANGIOPANCREATOGRAPHY, EXCHANGE TUBE/STENT;  Endoscopic Retrograde Cholangiopancreatogram with Stone Removal and Stent Exchange;  Surgeon: Fernando West MD;  [facility]     ESOPHAGOSCOPY, GASTROSCOPY, DUODENOSCOPY (EGD), COMBINED N/A 10/2/2014    Procedure: COMBINED ESOPHAGOSCOPY, GASTROSCOPY, DUODENOSCOPY (EGD);  Surgeon: Fernando West MD;  [facility]     ESOPHAGOSCOPY, GASTROSCOPY, DUODENOSCOPY (EGD), COMBINED N/A 8/10/2017    Procedure: COMBINED ESOPHAGOSCOPY, GASTROSCOPY, DUODENOSCOPY (EGD);  EGD;  Surgeon: Malu Morris MD;  [facility]     REVERSE ARTHROPLASTY SHOULDER Left 2016    Procedure: REVERSE ARTHROPLASTY SHOULDER;  Surgeon: Sue Holcomb MD;  [facility]     TRANSPLANT LIVER RECIPIENT  DONOR         Family History   Problem Relation Age of Onset     Dementia Father      Dementia Mother      Heart Failure Mother      Myocardial Infarction Mother      Hypertension Mother      DIABETES Brother      CANCER Sister        Social History   Substance Use Topics     Smoking status: Former Smoker     Types: Cigarettes     Quit date: 1991     Smokeless tobacco: Never Used      Comment: smoked for 15 years     Alcohol use No     Impression and plan:    A 68 year old patient with worsening ascites and muscle waisting with a long term history of biliary dilation. She is a good candidate for TIPS however I am concerned of high risk of Biliary sepsis post  TIPS. I have explained the technique to the patient and her  and reviewed the risks and benefits. They are ready to proceed. I told that I will discuss with Dr Ureña prior to go forward. They agreed with the plan.  Answers for HPI/ROS submitted by the patient on 5/1/2018   General Symptoms: Yes  Skin Symptoms: Yes  HENT Symptoms: Yes  EYE SYMPTOMS: Yes  HEART SYMPTOMS: Yes  LUNG SYMPTOMS: Yes  INTESTINAL SYMPTOMS: Yes  URINARY SYMPTOMS: Yes  GYNECOLOGIC SYMPTOMS: No  BREAST SYMPTOMS: No  SKELETAL SYMPTOMS: Yes  BLOOD SYMPTOMS: Yes  NERVOUS SYSTEM SYMPTOMS: Yes  MENTAL HEALTH SYMPTOMS: Yes  Fever: No  Loss of appetite: Yes  Weight loss: Yes  Weight gain: No  Fatigue: Yes  Night sweats: No  Chills: No  Increased stress: Yes  Excessive hunger: No  Excessive thirst: Yes  Feeling hot or cold when others believe the temperature is normal: Yes  Loss of height: No  Post-operative complications: No  Surgical site pain: No  Hallucinations: No  Change in or Loss of Energy: No  Hyperactivity: No  Confusion: Yes  Changes in hair: No  Changes in moles/birth marks: No  Itching: Yes  Rashes: No  Changes in nails: No  Acne: No  Hair in places you don't want it: No  Change in facial hair: No  Warts: No  Non-healing sores: No  Scarring: No  Flaking of skin: Yes  Color changes of hands/feet in cold : No  Sun sensitivity: No  Ear pain: No  Ear discharge: No  Hearing loss: No  Tinnitus: No  Nosebleeds: No  Congestion: Yes  Sinus pain: No  Trouble swallowing: No   Voice hoarseness: Yes  Mouth sores: Yes  Sore throat: Yes  Tooth pain: Yes  Gum tenderness: Yes  Bleeding gums: No  Change in taste: Yes  Change in sense of smell: No  Dry mouth: Yes  Hearing aid used: No  Neck lump: No  Eye pain: No  Vision loss: Yes  Dry eyes: Yes  Watery eyes: No  Eye bulging: No  Double vision: No  Flashing of lights: No  Spots: No  Floaters: Yes  Redness: No  Crossed eyes: No  Tunnel Vision: No  Yellowing of eyes: No  Eye irritation: No  Cough:  Yes  Sputum or phlegm: Yes  Coughing up blood: No  Difficulty breating or shortness of breath: Yes  Snoring: No  Wheezing: No  Difficulty breathing on exertion: Yes  Nighttime Cough: Yes  Difficulty breathing when lying flat: No  Chest pain or pressure: No  Fast or irregular heartbeat: No  Pain in legs with walking: Yes  Trouble breathing while lying down: No  Fingers or toes appear blue: No  High blood pressure: Yes  Low blood pressure: No  Fainting: No  Murmurs: No  Pacemaker: No  Varicose veins: No  Edema or swelling: Yes  Wake up at night with shortness of breath: No  Light-headedness: No  Exercise intolerance: No  Heart burn or indigestion: No  Nausea: No  Vomiting: No  Abdominal pain: No  Bloating: Yes  Constipation: No  Diarrhea: Yes  Blood in stool: No  Black stools: No  Rectal or Anal pain: No  Fecal incontinence: Yes  Yellowing of skin or eyes: No  Vomit with blood: No  Change in stools: Yes  Trouble holding urine or incontinence: Yes  Pain or burning: No  Trouble starting or stopping: Yes  Increased frequency of urination: No  Blood in urine: No  Decreased frequency of urination: No  Frequent nighttime urination: No  Flank pain: No  Difficulty emptying bladder: No  Back pain: Yes  Muscle aches: Yes  Neck pain: Yes  Swollen joints: Yes  Joint pain: Yes  Bone pain: No  Muscle cramps: No  Muscle weakness: Yes  Joint stiffness: No  Bone fracture: No  Anemia: No  Swollen glands: No  Easy bleeding or bruising: Yes  Trouble with coordination: Yes  Dizziness or trouble with balance: Yes  Fainting or black-out spells: No  Memory loss: Yes  Headache: Yes  Seizures: No  Speech problems: No  Tingling: No  Tremor: Yes  Weakness: Yes  Difficulty walking: Yes  Paralysis: No  Numbness: No  Nervous or Anxious: Yes  Depression: Yes  Trouble sleeping: Yes  Trouble thinking or concentrating: Yes  Mood changes: No  Panic attacks: No

## 2018-05-01 NOTE — MR AVS SNAPSHOT
After Visit Summary   5/1/2018    Miriam Magana    MRN: [id number]           Patient Information     Date Of Birth          1950        Visit Information        Provider Department      5/1/2018 10:00 AM Jocelyn Gill MD [facility] Interventional Radiology        Today's Diagnoses     Portal hypertension (H)    -  1    Cirrhosis of liver with ascites, unspecified hepatic cirrhosis type (H)           Follow-ups after your visit        Your next 10 appointments already scheduled     May 01, 2018  1:20 PM CDT   CT ABDOMEN W CONTRAST with UCCT2   [facility] Imaging [place] [facility] ([facility])    [address] [address] [phone]           Please bring any scans or X-rays taken at other hospitals, if similar tests were done. Also bring a list of your medicines, including vitamins, minerals and over-the-counter drugs. It is safest to leave personal items at home.  Be sure to tell your doctor:   If you have any allergies.   If there s any chance you are pregnant.   If you are breastfeeding.  How to prepare:   Do not eat or drink for 2 hours before your exam. If you need to take medicine, you may take it with small sips of water. (We may ask you to take liquid medicine as well.)   Please wear loose clothing, such as a sweat suit or jogging clothes. Avoid snaps, zippers and other metal. We may ask you to undress and put on a hospital gown.  Please arrive 30 minutes early for your CT. Once in the department you might be asked to drink water 15-20 minutes prior to your exam.  If indicated you may be asked to drink an oral contrast in advance of your CT.  If this is the case, the imaging team will let you know or be in contact with you prior to your appointment  Patients over 70 or patients with diabetes or kidney problems:   If you haven t had a blood test (creatinine test) within the last 30 days, the Cardiologist/Radiologist may require you to  get this test prior to your exam.  If you have diabetes:   Continue to take your metformin medication on the day of your exam  If you have any questions, please call the Imaging Department where you will have your exam.            May 01, 2018  2:00 PM CDT   (Arrive by 1:45 PM)   PAC EVALUATION with Selena Pac Anthony 1   [facility] ([facility])    [address] [facility]  [address]th [facility] [address]   [phone]            May 01, 2018  3:00 PM CDT   (Arrive by 2:45 PM)   PAC RN ASSESSMENT with Selena Pac Rn   [facility] Preoperative Assessment [place] ([facility])    [address] [address]   [phone]            May 01, 2018  3:30 PM CDT   (Arrive by 3:15 PM)   PAC Anesthesia Consult with Selena Pac Anesthesiologist   [facility] Assessment [place] ([facility])    [address] [address]   [phone]            May 02, 2018   Procedure with GENERIC ANESTHESIA PROVIDER   [facility]    [address] [facility] [address]   [phone]            May 02, 2018 11:30 AM CDT   IR TRANSVEN INTRAHEPATIC PORTOSYST SHUNT with [id number]   [facility]    [address] [facility] [address]   [phone]           1. You will need to have had a history and physical exam within 7 days of the procedure. 2. Laboratory test are to be obtained by your doctor prior to the exam (CBCP, INR and PTT) 3. Someone will need to drive you to and from the hospital. 4. If you are or may be pregnant, contact your doctor or a Radiology nurse prior to the day of the exam. 5. If you have diabetes, check with your doctor or a Radiology nurse to see if your insulin needs to be adjusted for the exam. 6. If you are taking Coumadin (to thin you  blood) please contact your doctor or a Radiology nurse at least 3 days before the exam for special instructions. 7. The day before your exam you may eat your regular diet and are encouraged to drink at least 2 quarts of clear liquids. Drink no alcoholic beverages for 24 hours prior to the exam. 8. Do not eat any solid food or milk products for 6 hours prior to the exam. You may drink clear liquids until 2 hours prior to the exam. Clear liquids include the following: water, Jell-O, clear broth, apple juice or any noncarbonated drink that you can see through (no pop!) 9. The morning of the exam you may brush your teeth and take medications as directed with a sip of water. 10. Tell the Radiology nurse if you have any allergies.            Sep 11, 2018  9:30 AM CDT   Lab with  LAB   [facility] ([facility])    [address] [place] [address]  1st Floor  [facility] [address] [phone]            Sep 11, 2018 10:00 AM CDT   (Arrive by 9:45 AM)   Return Liver Transplant with Vaibhav Ureña MD   [facility]atology ([facility])    [address] [place] [address]  [address]  [facility] [address] [phone]              Who to contact     Please call your clinic at [phone] to:    Ask questions about your health    Make or cancel appointments    Discuss your medicines    Learn about your test results    Speak to your doctor            Additional Information About Your Visit        adQuota Information     [employer] gives you secure access to your electronic health record. If you see a primary care provider, you can also send messages to your care team and make appointments. If you have questions, please call your primary care clinic.  If you do not have a primary care provider, please call [phone] and they will assist you.      [employer] is an electronic gateway that provides easy, online access to your medical records. With [employer], you can request a clinic appointment,  read your test results, renew a prescription or communicate with your care team.     To access your existing account, please contact your [facility] or call [phone] for assistance.        Care EveryWhere ID     This is your Care EveryWhere ID. This could be used by other organizations to access your [place] medical records  [id number]        Your Vitals Were     Pulse Temperature Respirations Pulse Oximetry BMI (Body Mass Index)       102 97.5  F (36.4  C) (Oral) 16 99% 30.45 kg/m2        Blood Pressure from Last 3 Encounters:   05/01/18 107/74   03/27/18 106/69   09/20/17 148/89    Weight from Last 3 Encounters:   05/01/18 83 kg (182 lb 15.7 oz)   03/27/18 79.4 kg (175 lb)   01/22/18 87.1 kg (192 lb)              We Performed the Following     CBC with platelets     Comprehensive metabolic panel     INR     IR Transven Intrahepatic Portosyst Shunt          Today's Medication Changes          These changes are accurate as of 5/1/18  1:02 PM.  If you have any questions, ask your nurse or doctor.               These medicines have changed or have updated prescriptions.        Dose/Directions    furosemide 40 MG tablet   Commonly known as:  LASIX   This may have changed:  how much to take   Used for:  Other ascites        Dose:  40 mg   Take 1 tablet (40 mg) by mouth daily   Quantity:  90 tablet   Refills:  3       insulin glargine 100 UNIT/ML injection   Commonly known as:  LANTUS   This may have changed:    - how much to take  - how to take this  - when to take this  - additional instructions   Used for:  Type II diabetes mellitus (H)        Start with 11 units of Lantus at night when you go home. Check your blood sugars at least twice a day at home and if you have hypoglycemia symptoms; please adjust insulin appropriately   Quantity:  3 mL   Refills:  0       insulin lispro 100 UNIT/ML injection   Commonly known as:  HumaLOG KWIKpen   This may have changed:    - how much to  take  - when to take this  - additional instructions   Used for:  Type II diabetes mellitus (H)        Use 2.5 Units/15gm carbs with meals. Sliding scale as needed:   Do Not give Correction Insulin if Pre-Meal BG < 140.  For Pre-Meal  - 189 give 1 unit.  For Pre-Meal  - 239 give 2 units.  For Pre-Meal  - 289 give 3 units.  For Pre-Meal  - 339 give 4 units.  For Pre-Meal - 399 give 5 units.  For Pre-Meal -449 give 6 units For Pre-Meal BG = or > 450 give 7 units.   Quantity:  1 Month   Refills:  0       LORazepam 1 MG tablet   Commonly known as:  ATIVAN   This may have changed:    - how much to take  - when to take this   Used for:  Depressive disorder        Dose:  0.5 mg   Take 0.5 tablets (0.5 mg) by mouth every 6 hours as needed   Quantity:  30 tablet   Refills:  0       pramipexole 0.125 MG tablet   Commonly known as:  MIRAPEX   This may have changed:  how much to take   Used for:  Insomnia        Dose:  0.125-0.25 mg   Take 1-2 tablets (0.125-0.25 mg) by mouth At Bedtime   Quantity:  90 tablet   Refills:  0       propranolol 40 MG tablet   Commonly known as:  INDERAL   This may have changed:  how much to take   Used for:  Essential and other specified forms of tremor        Dose:  40 mg   Take 1 tablet (40 mg) by mouth 2 times daily   Quantity:  60 tablet   Refills:  0       traZODone 50 MG tablet   Commonly known as:  DESYREL   This may have changed:  how much to take   Used for:  Insomnia        Dose:   mg   Take 1-2 tablets ( mg) by mouth nightly as needed for sleep   Quantity:  60 tablet   Refills:  0                Primary Care Provider Office Phone # Fax #    Yvon Lynn [phone]       [address] [address]        Equal Access to Services     DOUG HOBBS AH: Mary Florence, waaxda luqadaha, qaybta kaalmada adeegyadaniel, karrie cope. So [facility]  [phone].    ATENCIÓN: Si lauren nguyen, tiene a buckner disposición servicios gratuitos de asistencia lingüística. Brian hoskins [phone].    We comply with applicable federal civil rights laws and [place] laws. We do not discriminate on the basis of race, color, national origin, age, disability, sex, sexual orientation, or gender identity.            Thank you!     Thank you for choosing [facility] INTERVENTIONAL RADIOLOGY  for your care. Our goal is always to provide you with excellent care. Hearing back from our patients is one way we can continue to improve our services. Please take a few minutes to complete the written survey that you may receive in the mail after your visit with us. Thank you!             Your Updated Medication List - Protect others around you: Learn how to safely use, store and throw away your medicines at www.disposemymeds.org.          This list is accurate as of 5/1/18  1:02 PM.  Always use your most recent med list.                   Brand Name Dispense Instructions for use Diagnosis    acetaminophen 500 MG tablet    TYLENOL     Take 1,000 mg by mouth 2 times daily as needed for mild pain or fever        ARTIFICIAL TEARS 1.4 % ophthalmic solution   Generic drug:  polyvinyl alcohol      Place 1 drop into both eyes as needed        atorvastatin 40 MG tablet    LIPITOR     TAKE 1 TABLET BY MOUTH ONCE DAILY.        brexpiprazole 0.5 MG tablet    REXULTI     Take 1 mg by mouth daily (with breakfast)        * buPROPion 300 MG 24 hr tablet    WELLBUTRIN XL     Take 450 mg by mouth daily        * buPROPion 150 MG 24 hr tablet    WELLBUTRIN XL     Take 150 mg by mouth every morning with 300mg total dose of 450mg        BusPIRone HCl 30 MG Tabs      Take  by mouth 2 times daily.        calcium citrate-vitamin D 315-250 MG-UNIT Tabs per tablet    CITRACAL     Take 2 tablets by mouth 2 times daily.        cephALEXin 500 MG capsule    KEFLEX     Take 500 mg by mouth 3 times daily        cetirizine 10  MG tablet    zyrTEC     TAKE 1 TABLET BY MOUTH ONCE DAILY.        ferrous gluconate 324 (38 Fe) MG tablet    FERGON    90 tablet    Take 1 tablet (324 mg) by mouth 3 times daily (with meals)    Liver replaced by transplant (H)       fluticasone 50 MCG/ACT spray    FLONASE     Spray 1 spray into both nostrils 2 times daily as needed for allergies        furosemide 40 MG tablet    LASIX    90 tablet    Take 1 tablet (40 mg) by mouth daily    Other ascites       gabapentin 300 MG capsule    NEURONTIN    90 capsule    Take 1 capsule (300 mg) by mouth 3 times daily    Shoulder arthritis       insulin glargine 100 UNIT/ML injection    LANTUS    3 mL    Start with 11 units of Lantus at night when you go home. Check your blood sugars at least twice a day at home and if you have hypoglycemia symptoms; please adjust insulin appropriately    Type II diabetes mellitus (H)       insulin lispro 100 UNIT/ML injection    HumaLOG KWIKpen    1 Month    Use 2.5 Units/15gm carbs with meals. Sliding scale as needed:   Do Not give Correction Insulin if Pre-Meal BG < 140.  For Pre-Meal  - 189 give 1 unit.  For Pre-Meal  - 239 give 2 units.  For Pre-Meal  - 289 give 3 units.  For Pre-Meal  - 339 give 4 units.  For Pre-Meal - 399 give 5 units.  For Pre-Meal -449 give 6 units For Pre-Meal BG = or > 450 give 7 units.    Type II diabetes mellitus (H)       LACTULOSE PO      Take 10 g by mouth 3 times daily        levofloxacin 750 MG tablet    LEVAQUIN     Take 750 mg by mouth daily        LORazepam 1 MG tablet    ATIVAN    30 tablet    Take 0.5 tablets (0.5 mg) by mouth every 6 hours as needed    Depressive disorder       metFORMIN 500 MG tablet    GLUCOPHAGE     750 mg at HS        multivitamin, therapeutic with minerals Tabs tablet      Take 1 tablet by mouth daily        mycophenolate 500 MG tablet     60 tablet    Take 2 tablets (1,000 mg) by mouth 2 times daily    Status post liver transplantation (H)        omeprazole 20 MG CR capsule    priLOSEC    90 capsule    Take 1 capsule (20 mg) by mouth daily    Gastroesophageal reflux disease without esophagitis       pramipexole 0.125 MG tablet    MIRAPEX    90 tablet    Take 1-2 tablets (0.125-0.25 mg) by mouth At Bedtime    Insomnia       predniSONE 5 MG tablet    DELTASONE    30 tablet    Take 1 tablet (5 mg) by mouth daily    Liver replaced by transplant (H)       propranolol 40 MG tablet    INDERAL    60 tablet    Take 1 tablet (40 mg) by mouth 2 times daily    Essential and other specified forms of tremor       rifaximin 550 MG Tabs tablet    XIFAXAN     Take 550 mg by mouth 2 times daily        senna-docusate 8.6-50 MG per tablet    SENOKOT-S;PERICOLACE    60 tablet    Take 1-2 tablets by mouth 2 times daily as needed for constipation (while taking narcotic pain medications)    Osteoarthritis of left glenohumeral joint       sertraline 100 MG tablet    ZOLOFT     Take 200 mg by mouth daily AM        spironolactone 25 MG tablet    ALDACTONE     Take 25 mg by mouth daily        TRAMADOL HCL PO      Take 25 mg by mouth every 6 hours as needed for moderate to severe pain        traZODone 50 MG tablet    DESYREL    60 tablet    Take 1-2 tablets ( mg) by mouth nightly as needed for sleep    Insomnia       ursodiol 300 MG capsule    ACTIGALL    270 capsule    Take 2 caps in a.m. And 1 cap in p.m.    Liver replaced by transplant (H)       vitamin D 2000 units tablet      Take 2 tablets by mouth daily AM        * Notice:  This list has 2 medication(s) that are the same as other medications prescribed for you. Read the directions carefully, and ask your doctor or other care provider to review them with you.

## 2018-05-01 NOTE — H&P
"  Pre-Operative H & P     CC:  Preoperative exam to assess for increased cardiopulmonary risk while undergoing surgery and anesthesia.    Date of Encounter: 5/1/2018  Primary Care Physician:  Yvon Lynn  Miriam Magana is a 68 year old female who presents for pre-operative H & P in preparation for *** with  *** on { :[id number]} at {[employer][id number]::\"[facility]\",\"[facility]\",\"[facility] [place]\"}. ***    {   History obtained from:3642015::\"History is obtained from the patient\"}.     Past Medical History  Past Medical History:   Diagnosis Date     Cirrhosis, biliary (H)     s/p Liver transplant in 1995     CKD (chronic kidney disease)      Depression      Diabetes type 2, controlled (H)      Esophagitis      Fibromyalgia      History of blood transfusion      Hypertension      Memory impairment      MALIK (obstructive sleep apnea)     CPAP     Other chronic pain     bilateral shoulder     Secondary adrenal insufficiency (H)      Urinary incontinence        Past Surgical History  Past Surgical History:   Procedure Laterality Date     C section X 2       ENDOSCOPIC RETROGRADE CHOLANGIOPANCREATOGRAM N/A 10/2/2014    Procedure: ENDOSCOPIC RETROGRADE CHOLANGIOPANCREATOGRAM;  Surgeon: Fernando West MD;  Location: [facility] OR     ENDOSCOPIC RETROGRADE CHOLANGIOPANCREATOGRAM N/A 9/14/2016    Procedure: ENDOSCOPIC RETROGRADE CHOLANGIOPANCREATOGRAM;  Surgeon: Fernando West MD;  Location:  OR     ENDOSCOPIC RETROGRADE CHOLANGIOPANCREATOGRAM N/A 7/5/2017    Procedure: COMBINED ENDOSCOPIC RETROGRADE CHOLANGIOPANCREATOGRAPHY, PLACE TUBE/STENT;  Endoscopic Retrograde Cholangiopancreatogram with Biliary Stone Removal, Biliary Duct Dilation, Biliary Duct Stent Placement X2;  Surgeon: Fernando West MD;  Location: [facility] OR     ENDOSCOPIC RETROGRADE CHOLANGIOPANCREATOGRAPHY, EXCHANGE " TUBE/STENT N/A 2017    Procedure: ENDOSCOPIC RETROGRADE CHOLANGIOPANCREATOGRAPHY, EXCHANGE TUBE/STENT;  Endoscopic Retrograde Cholangiopancreatogram with Stone Removal and Stent Exchange;  Surgeon: Fernando West MD;  Location: [facility] OR     ESOPHAGOSCOPY, GASTROSCOPY, DUODENOSCOPY (EGD), COMBINED N/A 10/2/2014    Procedure: COMBINED ESOPHAGOSCOPY, GASTROSCOPY, DUODENOSCOPY (EGD);  Surgeon: Fernando West MD;  Location: [facility] OR     ESOPHAGOSCOPY, GASTROSCOPY, DUODENOSCOPY (EGD), COMBINED N/A 8/10/2017    Procedure: COMBINED ESOPHAGOSCOPY, GASTROSCOPY, DUODENOSCOPY (EGD);  EGD;  Surgeon: Malu Morris MD;  Location: [facility] GI     REVERSE ARTHROPLASTY SHOULDER Left 2016    Procedure: REVERSE ARTHROPLASTY SHOULDER;  Surgeon: Sue Holcomb MD;  Location: [facility] OR     TRANSPLANT LIVER RECIPIENT  DONOR         Hx of Blood transfusions/reactions: ***     Hx of abnormal bleeding or anti-platelet use: ***    Menstrual history: No LMP recorded. Patient is postmenopausal.: ***    Steroid use in the last year: ***    Personal or FH with difficulty with Anesthesia:  ***    Prior to Admission Medications  Current Outpatient Prescriptions   Medication Sig Dispense Refill     acetaminophen (TYLENOL) 500 MG tablet Take 1,000 mg by mouth 2 times daily as needed for mild pain or fever        atorvastatin (LIPITOR) 40 MG tablet TAKE 1 TABLET BY MOUTH ONCE DAILY.       brexpiprazole (REXULTI) 0.5 MG tablet Take 1 mg by mouth daily (with breakfast)        buPROPion (WELLBUTRIN XL) 150 MG 24 hr tablet Take 150 mg by mouth every morning with 300mg total dose of 450mg       buPROPion (WELLBUTRIN XL) 300 MG 24 hr tablet Take 450 mg by mouth daily        BusPIRone HCl 30 MG TABS Take  by mouth 2 times daily.       calcium citrate-vitamin D (CITRACAL) 315-250 MG-UNIT TABS Take 2 tablets by mouth 2 times daily.       CELLCEPT 500 MG PO TABLET Take 2 tablets (1,000 mg) by mouth 2 times daily 60  tablet 11     cephALEXin (KEFLEX) 500 MG capsule Take 500 mg by mouth 3 times daily       cetirizine (ZYRTEC) 10 MG tablet TAKE 1 TABLET BY MOUTH ONCE DAILY.       Cholecalciferol (VITAMIN D) 2000 UNIT tablet Take 2 tablets by mouth daily AM       ferrous gluconate (FERGON) 324 (38 FE) MG tablet Take 1 tablet (324 mg) by mouth 3 times daily (with meals) 90 tablet 1     fluticasone (FLONASE) 50 MCG/ACT spray Spray 1 spray into both nostrils 2 times daily as needed for allergies        furosemide (LASIX) 40 MG tablet Take 1 tablet (40 mg) by mouth daily (Patient taking differently: Take 20 mg by mouth daily ) 90 tablet 3     gabapentin (NEURONTIN) 300 MG capsule Take 1 capsule (300 mg) by mouth 3 times daily 90 capsule 0     insulin glargine (LANTUS) 100 UNIT/ML PEN Start with 11 units of Lantus at night when you go home. Check your blood sugars at least twice a day at home and if you have hypoglycemia symptoms; please adjust insulin appropriately (Patient taking differently: Inject 55 Units Subcutaneous At Bedtime ) 3 mL 0     insulin lispro (HUMALOG KWIKPEN) 100 UNIT/ML soln Use 2.5 Units/15gm carbs with meals. Sliding scale as needed:   Do Not give Correction Insulin if Pre-Meal BG < 140.  For Pre-Meal  - 189 give 1 unit.  For Pre-Meal  - 239 give 2 units.  For Pre-Meal  - 289 give 3 units.  For Pre-Meal  - 339 give 4 units.  For Pre-Meal - 399 give 5 units.  For Pre-Meal -449 give 6 units For Pre-Meal BG = or > 450 give 7 units. (Patient taking differently: 16 Units 3 times daily (before meals) Use 3 Units/15gm carbs with meals. Sliding scale as needed:   Do Not give Correction Insulin if Pre-Meal BG < 140.  For Pre-Meal  - 189 give 1 unit.  For Pre-Meal  - 239 give 2 units.  For Pre-Meal  - 289 give 3 units.  For Pre-Meal  - 339 give 4 units.  For Pre-Meal - 399 give 5 units.  For Pre-Meal -449 give 6 units For Pre-Meal BG = or > 450 give 7  units.) 1 Month 0     LACTULOSE PO Take 10 g by mouth 3 times daily       levofloxacin (LEVAQUIN) 750 MG tablet Take 750 mg by mouth daily       LORazepam (ATIVAN) 1 MG tablet Take 0.5 tablets (0.5 mg) by mouth every 6 hours as needed (Patient taking differently: Take 1 mg by mouth At Bedtime ) 30 tablet 0     metFORMIN (GLUCOPHAGE) 500 MG tablet 750 mg at HS       multivitamin, therapeutic with minerals (THERA-VIT-M) TABS Take 1 tablet by mouth daily       omeprazole (PRILOSEC) 20 MG CR capsule Take 1 capsule (20 mg) by mouth daily 90 capsule 3     polyvinyl alcohol (ARTIFICIAL TEARS) 1.4 % ophthalmic solution Place 1 drop into both eyes as needed       pramipexole (MIRAPEX) 0.125 MG tablet Take 1-2 tablets (0.125-0.25 mg) by mouth At Bedtime (Patient taking differently: Take 0.5 mg by mouth At Bedtime ) 90 tablet 0     predniSONE (DELTASONE) 5 MG tablet Take 1 tablet (5 mg) by mouth daily 30 tablet 11     propranolol (INDERAL) 40 MG tablet Take 1 tablet (40 mg) by mouth 2 times daily (Patient taking differently: Take 20 mg by mouth 2 times daily ) 60 tablet 0     rifaximin (XIFAXAN) 550 MG TABS tablet Take 550 mg by mouth 2 times daily       senna-docusate (SENOKOT-S;PERICOLACE) 8.6-50 MG per tablet Take 1-2 tablets by mouth 2 times daily as needed for constipation (while taking narcotic pain medications) 60 tablet 1     sertraline (ZOLOFT) 100 MG tablet Take 200 mg by mouth daily AM       spironolactone (ALDACTONE) 25 MG tablet Take 25 mg by mouth daily       TRAMADOL HCL PO Take 25 mg by mouth every 6 hours as needed for moderate to severe pain       traZODone (DESYREL) 50 MG tablet Take 1-2 tablets ( mg) by mouth nightly as needed for sleep (Patient taking differently: Take 150 mg by mouth nightly as needed for sleep ) 60 tablet 0     ursodiol (ACTIGALL) 300 MG capsule Take 2 caps in a.m. And 1 cap in p.m. 270 capsule 3       Allergies  Allergies   Allergen Reactions     Blood Transfusion Related  (Informational Only) Other (See Comments)     Patient has a history of a clinically significant antibody against RBC antigens.  A delay in compatible RBCs may occur.  Patient has Big Anti E, anti Pulido-a and unidentified antibody.       Aspirin Other (See Comments)     due to liver transplant and high bleed risk     Lunesta 2mg [Eszopiclone] Other (See Comments)     Suicidal   vivid dreams     Morphine Other (See Comments)     Bad dreams     Pravastatin Unknown and Other (See Comments)     Escobar's        Social History  Social History     Social History     Marital status:      Spouse name: N/A     Number of children: N/A     Years of education: N/A     Occupational History     Not on file.     Social History Main Topics     Smoking status: Former Smoker     Types: Cigarettes     Quit date: 9/1/1991     Smokeless tobacco: Never Used      Comment: smoked for 15 years     Alcohol use No     Drug use: No     Sexual activity: Not on file     Other Topics Concern     Not on file     Social History Narrative       Family History  Family History   Problem Relation Age of Onset     Dementia Father      Dementia Mother      Heart Failure Mother      Myocardial Infarction Mother      Hypertension Mother      DIABETES Brother      CANCER Sister        Review of Systems  Preprocedure Note     Last edited 05/01/18 4389 by Rosalina May APRN CNS               Anesthesia Evaluation     . Pt has had prior anesthetic. Type: General and MAC    No history of anesthetic complications          ROS/MED HX    ENT/Pulmonary: Comment: 20 pack years, quit in 90s    (+)sleep apnea, other ENT- mouth sore-regurg of bile, tobacco use, Past use doesn't use CPAP mask doesn't fit cmH2O , recent URI unresolved Current bronchitis being treated with antibioitcs, occ Duoneb: . .    Neurologic:     (+)other neuro one episode of hepatic encephalopathy    Cardiovascular:     (+) Dyslipidemia, hypertension----. : . . . :. . Previous cardiac  testing date:results:date: results:ECG reviewed date: results: date: results:         (-) taking anticoagulants/antiplatelets   METS/Exercise Tolerance:  1 - Eating, dressing   Hematologic:     (+) History of Transfusion no previous transfusion reaction Other Hematologic Disorder-blood antibodies      Musculoskeletal:   (+) arthritis, , , other musculoskeletal- fibromyalgia      GI/Hepatic: Comment: Biliary cirrhosis with need for frequent large volume paracentesis, last 4/20/18 6 liters    (+) liver disease, Other GI/Hepatic s/p liver transplant; cholangitis; h/o gi bleed; esophagitis      Renal/Genitourinary:     (+) chronic renal disease, type: CRI, Pt does not require dialysis, Pt has no history of transplant,       Endo:     (+) type II DM Chronic steroid usage for Post Transplant Immunosuppression .      Psychiatric:     (+) psychiatric history depression      Infectious Disease:  - neg infectious disease ROS       Malignancy:      - no malignancy   Other:    (+) H/O Chronic Pain,                   Physical Exam      Airway   Mallampati: II  TM distance: >3 FB  Neck ROM: full    Dental   (+) upper dentures    Cardiovascular   Rhythm and rate: regular and normal      Pulmonary (+) rhonchi and rales       Other findings: For further details of assessment, testing, and physical exam please see H and P completed on same date.           PAC Discussion and Assessment    ASA Classification: 4  Case is suitable for:   Anesthetic techniques and relevant risks discussed: GA  Invasive monitoring and risk discussed:   Types:   Possibility and Risk of blood transfusion discussed:   NPO instructions given:   Additional anesthetic preparation and risks discussed:   Needs early admission to pre-op area:   Other:     PAC Resident/NP Anesthesia Assessment:  Patient is a 68 y.o. F with a complex medical history including recent pneumonia, biliary cirrhosis, chronic renal disease, and s/p liver transplant in 1995 who presents for  pre-evaluation prior to TIPS procedure.  Discussed with the patient that we recommend not proceeding with the TIPS procedure at this point given her recent pneumonia.  The risk of pulmonary complication with the anticipated procedure is high.  Recommended further optimization with primary care team prior to procedure.  Will call interventional radiology with recommendation to cancel current procedure.       Patient seen and discussed with Dr. Mello.   Orlando Savage Ca-1      Mid-Level Provider/Resident:   Date:   Time:     Attending Anesthesiologist Anesthesia Assessment:  I have examined the patient and reviewed the medical record.  I have discussed the patient with the LENCHO and concur with her assessment.  I have discussed the patient with the resident and concur with his assessment.  The patient is scheduled for TIPS for management of increasing ascites in the setting of remote liver transplant.  The patient developed a productive cough 3 weeks ago.  She has been taking amoxicillin clavulanate for 2 weeks and has noted little improvement.  She has cough productive of green sputum.  She denies fever (but is on chronic prednisone and CellCept)    PE:  Frail, icteric female in WC.  Frequent rattling cough.  Lungs diffuse crackles, rales, and rhonchi.  SpO2 99%  RR 20    Patient should have surgery postponed until 4 weeks after acute infection is cleared.  Discussed with IR  Discussed with GI  Encouraged Ms. Magana to return to her PCP.         Reviewed and Signed by PAC Anesthesiologist  Anesthesiologist: Christian Mello MD  Date: 5/1/2018  Time:   Pass/Fail:  Fail  Disposition:     PAC Pharmacist Assessment:        Pharmacist:   Date:   Time:                           .     Revision History       Date/Time User Provider Type Action    > 5/1/2018  3:45 PM Rosalina May APRN CNS Clinical Nurse Specialist Addend     5/1/2018  3:44 PM Rosalina May APRN CNS Clinical Nurse Specialist Addend     5/1/2018  3:43 PM  "Rosalina May APRN CNS Clinical Nurse Specialist Addend     5/1/2018  3:08 PM Christian Mello MD Physician Addend     5/1/2018  2:55 PM Orlando Savage MD Resident Addend     5/1/2018  2:52 PM Orlando Savage MD Resident Addend     5/1/2018  2:38 PM Rosalina May APRN CNS Clinical Nurse Specialist Sign                The complete review of systems is negative other than noted in the HPI or here.   Temp: 97.5  F (36.4  C)   BP: 107/74 Pulse: 102     SpO2: 99 %         182 lbs 15.7 oz  5' 4\"   Body mass index is 31.41 kg/(m^2).       Physical Exam  Constitutional: Awake, alert, cooperative, no apparent distress, and appears stated age.  Eyes: Pupils equal, round and reactive to light, extra ocular muscles intact, sclera clear, conjunctiva normal.  HENT: Normocephalic, oral pharynx with moist mucus membranes, good dentition. No goiter appreciated.   Respiratory: Clear to auscultation bilaterally, no crackles or wheezing.  Cardiovascular: Regular rate and rhythm, normal S1 and S2, and no murmur noted.  Carotids +2, no bruits. No edema. Palpable pulses to radial  DP and PT arteries.   GI: Normal bowel sounds, soft, non-distended, non-tender, no masses palpated, no hepatosplenomegaly.  Surgical scars: ***  Lymph/Hematologic: No cervical lymphadenopathy and no supraclavicular lymphadenopathy.  Genitourinary:  ***  Skin: Warm and dry.  No rashes at anticipated surgical site.   Musculoskeletal: Full ROM of neck. There is no redness, warmth, or swelling of the joints. Gross motor strength is normal.    Neurologic: Awake, alert, oriented to name, place and time. Cranial nerves II-XII are grossly intact. Gait is normal.   Neuropsychiatric: Calm, cooperative. Normal affect.     Labs: (personally reviewed)  EKG: Personally reviewed but formal cardiology read pending: ***  Cardiac echo: ***  Stress test: ***  PFT's: ***    Outside records reviewed from: ***    ASSESSMENT and PLAN  Miriam Magana is a 68 year old female " "scheduled to undergo ***. {He/She:169324} has the following specific operative considerations:   - RCRI : {PREOP REVISED CARDIAC INDEX (RCI):451391::\"No serious cardiac risks\"}.  *** risk of major adverse cardiac event.   - Anesthesia considerations:  Refer to PAC assessment in anesthesia records  - VTE risk:  - MALIK # of risks ***/8 = ***  - Post-op delirium risk: ***  - If afib, [id number] score ***.  Risk category ***.    - Risk of PONV score = ***.  If > 2, anti-emetic intervention recommended.  - If on chronic opiates, morphine equivalent = *** (if > 60mg/24 hr--> needs pain team consult)    Patient was discussed with { PAC Providers:[id number]}.    BERLIN Srivastava CNS  Preoperative Assessment Center  [facility]  Phone: [phone]  Fax: [phone]  "

## 2018-05-01 NOTE — H&P
"  Pre-Operative H & P     CC:  Preoperative exam to assess for increased cardiopulmonary risk while undergoing surgery and anesthesia.    Date of Encounter: 5/1/2018  Primary Care Physician:  Yvon Lynn  Miriam Magana is a 68 year old female who presents for pre-operative H & P in preparation for *** with  *** on { :9134979} at {[employer]135186::\"[facility]\",\"[facility]\",\"[facility]\"}. ***    {   History obtained from:[id number]::\"History is obtained from the patient\"}.     Past Medical History  Past Medical History:   Diagnosis Date     Cirrhosis, biliary (H)     s/p Liver transplant in 1995     CKD (chronic kidney disease)      Depression      Diabetes type 2, controlled (H)      Esophagitis      Fibromyalgia      History of blood transfusion      Hypertension      Memory impairment      MALIK (obstructive sleep apnea)     CPAP     Other chronic pain     bilateral shoulder     Secondary adrenal insufficiency (H)      Urinary incontinence        Past Surgical History  Past Surgical History:   Procedure Laterality Date     C section X 2       ENDOSCOPIC RETROGRADE CHOLANGIOPANCREATOGRAM N/A 10/2/2014    Procedure: ENDOSCOPIC RETROGRADE CHOLANGIOPANCREATOGRAM;  Surgeon: Fernando West MD;  Location: [facility] OR     ENDOSCOPIC RETROGRADE CHOLANGIOPANCREATOGRAM N/A 9/14/2016    Procedure: ENDOSCOPIC RETROGRADE CHOLANGIOPANCREATOGRAM;  Surgeon: Fernando West MD;  Location:  OR     ENDOSCOPIC RETROGRADE CHOLANGIOPANCREATOGRAM N/A 7/5/2017    Procedure: COMBINED ENDOSCOPIC RETROGRADE CHOLANGIOPANCREATOGRAPHY, PLACE TUBE/STENT;  Endoscopic Retrograde Cholangiopancreatogram with Biliary Stone Removal, Biliary Duct Dilation, Biliary Duct Stent Placement X2;  Surgeon: Fernando West MD;  Location: [facility] OR     ENDOSCOPIC RETROGRADE CHOLANGIOPANCREATOGRAPHY, EXCHANGE " TUBE/STENT N/A 2017    Procedure: ENDOSCOPIC RETROGRADE CHOLANGIOPANCREATOGRAPHY, EXCHANGE TUBE/STENT;  Endoscopic Retrograde Cholangiopancreatogram with Stone Removal and Stent Exchange;  Surgeon: Fernando West MD;  Location: [facility] OR     ESOPHAGOSCOPY, GASTROSCOPY, DUODENOSCOPY (EGD), COMBINED N/A 10/2/2014    Procedure: COMBINED ESOPHAGOSCOPY, GASTROSCOPY, DUODENOSCOPY (EGD);  Surgeon: Fernando West MD;  Location: [facility] OR     ESOPHAGOSCOPY, GASTROSCOPY, DUODENOSCOPY (EGD), COMBINED N/A 8/10/2017    Procedure: COMBINED ESOPHAGOSCOPY, GASTROSCOPY, DUODENOSCOPY (EGD);  EGD;  Surgeon: Malu Morris MD;  Location: [facility] GI     REVERSE ARTHROPLASTY SHOULDER Left 2016    Procedure: REVERSE ARTHROPLASTY SHOULDER;  Surgeon: Sue Holcomb MD;  Location: [facility] OR     TRANSPLANT LIVER RECIPIENT  DONOR         Hx of Blood transfusions/reactions: ***     Hx of abnormal bleeding or anti-platelet use: ***    Menstrual history: No LMP recorded. Patient is postmenopausal.: ***    Steroid use in the last year: ***    Personal or FH with difficulty with Anesthesia:  ***    Prior to Admission Medications  Current Outpatient Prescriptions   Medication Sig Dispense Refill     acetaminophen (TYLENOL) 500 MG tablet Take 1,000 mg by mouth 2 times daily as needed for mild pain or fever        atorvastatin (LIPITOR) 40 MG tablet TAKE 1 TABLET BY MOUTH ONCE DAILY.       brexpiprazole (REXULTI) 0.5 MG tablet Take 1 mg by mouth daily (with breakfast)        buPROPion (WELLBUTRIN XL) 150 MG 24 hr tablet Take 150 mg by mouth every morning with 300mg total dose of 450mg       buPROPion (WELLBUTRIN XL) 300 MG 24 hr tablet Take 450 mg by mouth daily        BusPIRone HCl 30 MG TABS Take  by mouth 2 times daily.       calcium citrate-vitamin D (CITRACAL) 315-250 MG-UNIT TABS Take 2 tablets by mouth 2 times daily.       CELLCEPT 500 MG PO TABLET Take 2 tablets (1,000 mg) by mouth 2 times daily 60  tablet 11     cephALEXin (KEFLEX) 500 MG capsule Take 500 mg by mouth 3 times daily       cetirizine (ZYRTEC) 10 MG tablet TAKE 1 TABLET BY MOUTH ONCE DAILY.       Cholecalciferol (VITAMIN D) 2000 UNIT tablet Take 2 tablets by mouth daily AM       ferrous gluconate (FERGON) 324 (38 FE) MG tablet Take 1 tablet (324 mg) by mouth 3 times daily (with meals) 90 tablet 1     fluticasone (FLONASE) 50 MCG/ACT spray Spray 1 spray into both nostrils 2 times daily as needed for allergies        furosemide (LASIX) 40 MG tablet Take 1 tablet (40 mg) by mouth daily (Patient taking differently: Take 20 mg by mouth daily ) 90 tablet 3     gabapentin (NEURONTIN) 300 MG capsule Take 1 capsule (300 mg) by mouth 3 times daily 90 capsule 0     insulin glargine (LANTUS) 100 UNIT/ML PEN Start with 11 units of Lantus at night when you go home. Check your blood sugars at least twice a day at home and if you have hypoglycemia symptoms; please adjust insulin appropriately (Patient taking differently: Inject 55 Units Subcutaneous At Bedtime ) 3 mL 0     insulin lispro (HUMALOG KWIKPEN) 100 UNIT/ML soln Use 2.5 Units/15gm carbs with meals. Sliding scale as needed:   Do Not give Correction Insulin if Pre-Meal BG < 140.  For Pre-Meal  - 189 give 1 unit.  For Pre-Meal  - 239 give 2 units.  For Pre-Meal  - 289 give 3 units.  For Pre-Meal  - 339 give 4 units.  For Pre-Meal - 399 give 5 units.  For Pre-Meal -449 give 6 units For Pre-Meal BG = or > 450 give 7 units. (Patient taking differently: 16 Units 3 times daily (before meals) Use 3 Units/15gm carbs with meals. Sliding scale as needed:   Do Not give Correction Insulin if Pre-Meal BG < 140.  For Pre-Meal  - 189 give 1 unit.  For Pre-Meal  - 239 give 2 units.  For Pre-Meal  - 289 give 3 units.  For Pre-Meal  - 339 give 4 units.  For Pre-Meal - 399 give 5 units.  For Pre-Meal -449 give 6 units For Pre-Meal BG = or > 450 give 7  units.) 1 Month 0     LACTULOSE PO Take 10 g by mouth 3 times daily       levofloxacin (LEVAQUIN) 750 MG tablet Take 750 mg by mouth daily       LORazepam (ATIVAN) 1 MG tablet Take 0.5 tablets (0.5 mg) by mouth every 6 hours as needed (Patient taking differently: Take 1 mg by mouth At Bedtime ) 30 tablet 0     metFORMIN (GLUCOPHAGE) 500 MG tablet 750 mg at HS       multivitamin, therapeutic with minerals (THERA-VIT-M) TABS Take 1 tablet by mouth daily       omeprazole (PRILOSEC) 20 MG CR capsule Take 1 capsule (20 mg) by mouth daily 90 capsule 3     polyvinyl alcohol (ARTIFICIAL TEARS) 1.4 % ophthalmic solution Place 1 drop into both eyes as needed       pramipexole (MIRAPEX) 0.125 MG tablet Take 1-2 tablets (0.125-0.25 mg) by mouth At Bedtime (Patient taking differently: Take 0.5 mg by mouth At Bedtime ) 90 tablet 0     predniSONE (DELTASONE) 5 MG tablet Take 1 tablet (5 mg) by mouth daily 30 tablet 11     propranolol (INDERAL) 40 MG tablet Take 1 tablet (40 mg) by mouth 2 times daily (Patient taking differently: Take 20 mg by mouth 2 times daily ) 60 tablet 0     rifaximin (XIFAXAN) 550 MG TABS tablet Take 550 mg by mouth 2 times daily       senna-docusate (SENOKOT-S;PERICOLACE) 8.6-50 MG per tablet Take 1-2 tablets by mouth 2 times daily as needed for constipation (while taking narcotic pain medications) 60 tablet 1     sertraline (ZOLOFT) 100 MG tablet Take 200 mg by mouth daily AM       spironolactone (ALDACTONE) 25 MG tablet Take 25 mg by mouth daily       TRAMADOL HCL PO Take 25 mg by mouth every 6 hours as needed for moderate to severe pain       traZODone (DESYREL) 50 MG tablet Take 1-2 tablets ( mg) by mouth nightly as needed for sleep (Patient taking differently: Take 150 mg by mouth nightly as needed for sleep ) 60 tablet 0     ursodiol (ACTIGALL) 300 MG capsule Take 2 caps in a.m. And 1 cap in p.m. 270 capsule 3       Allergies  Allergies   Allergen Reactions     Blood Transfusion Related  (Informational Only) Other (See Comments)     Patient has a history of a clinically significant antibody against RBC antigens.  A delay in compatible RBCs may occur.  Patient has Big Anti E, anti Pulido-a and unidentified antibody.       Aspirin Other (See Comments)     due to liver transplant and high bleed risk     Lunesta 2mg [Eszopiclone] Other (See Comments)     Suicidal   vivid dreams     Morphine Other (See Comments)     Bad dreams     Pravastatin Unknown and Other (See Comments)     Escobar's        Social History  Social History     Social History     Marital status:      Spouse name: N/A     Number of children: N/A     Years of education: N/A     Occupational History     Not on file.     Social History Main Topics     Smoking status: Former Smoker     Types: Cigarettes     Quit date: 9/1/1991     Smokeless tobacco: Never Used      Comment: smoked for 15 years     Alcohol use No     Drug use: No     Sexual activity: Not on file     Other Topics Concern     Not on file     Social History Narrative       Family History  Family History   Problem Relation Age of Onset     Dementia Father      Dementia Mother      Heart Failure Mother      Myocardial Infarction Mother      Hypertension Mother      DIABETES Brother      CANCER Sister        Review of Systems  Preprocedure Note     Last edited 05/01/18 6282 by Orlando Savage MD               Anesthesia Evaluation     . Pt has had prior anesthetic. Type: General and MAC    No history of anesthetic complications          ROS/MED HX    ENT/Pulmonary: Comment: 20 pack years, quit in 90s    (+)sleep apnea, other ENT- mouth sore-regurg of bile, tobacco use, Past use doesn't use CPAP mask doesn't fit cmH2O , recent URI unresolved Current bronchitis being treated with antibioitcs, occ Duoneb: . .    Neurologic:     (+)other neuro one episode of hepatic encephalopathy    Cardiovascular:     (+) Dyslipidemia, hypertension----. : . . . :. . Previous cardiac testing  date:results:date: results:ECG reviewed date: results: date: results:         (-) taking anticoagulants/antiplatelets   METS/Exercise Tolerance:  1 - Eating, dressing   Hematologic:     (+) History of Transfusion previous transfusion reaction -      Musculoskeletal:   (+) arthritis, , , other musculoskeletal- fibromyalgia      GI/Hepatic: Comment: Biliary cirrhosis with need for frequent large volume paracentesis, last 4/20/18 6 liters    (+) liver disease, Other GI/Hepatic s/p liver transplant; cholangitis; h/o gi bleed; esophagitis      Renal/Genitourinary:     (+) chronic renal disease, type: CRI, Pt does not require dialysis, Pt has no history of transplant,       Endo:     (+) type II DM Chronic steroid usage for Post Transplant Immunosuppression .      Psychiatric:     (+) psychiatric history depression      Infectious Disease:  - neg infectious disease ROS       Malignancy:      - no malignancy   Other:    (+) H/O Chronic Pain,                             PAC Discussion and Assessment    ASA Classification: 4  Case is suitable for:   Anesthetic techniques and relevant risks discussed: GA  Invasive monitoring and risk discussed:   Types:   Possibility and Risk of blood transfusion discussed:   NPO instructions given:   Additional anesthetic preparation and risks discussed:   Needs early admission to pre-op area:   Other:     PAC Resident/NP Anesthesia Assessment:  Patient is a 68 y.o. F with a complex medical history including recent pneumonia, biliary cirrhosis, chronic renal disease, and s/p liver transplant in 1995 who presents for pre-evaluation prior to TIPS procedure.  Discussed with the patient that we recommend not proceeding with the TIPS procedure at this point given her recent pneumonia.  The risk of pulmonary complication with the anticipated procedure is high.  Recommended further optimization with primary care team prior to procedure.  Will call interventional radiology with recommendation to  "cancel current procedure.       Patient seen and discussed with Dr. Mello.   Orlando Savage Ca-1      Mid-Level Provider/Resident:   Date:   Time:     Attending Anesthesiologist Anesthesia Assessment:        Anesthesiologist:   Date:   Time:   Pass/Fail:   Disposition:     PAC Pharmacist Assessment:        Pharmacist:   Date:   Time:                           .     Revision History       Date/Time User Provider Type Action    > 5/1/2018  2:55 PM Orlando Savage MD Resident Addend     5/1/2018  2:52 PM Orlando Savage MD Resident Addend     5/1/2018  2:38 PM Rosalina May APRN CNS Clinical Nurse Specialist Sign                The complete review of systems is negative other than noted in the HPI or here.   Temp: 97.5  F (36.4  C)   BP: 107/74 Pulse: 102     SpO2: 99 %         182 lbs 15.7 oz  5' 4\"   Body mass index is 31.41 kg/(m^2).       Physical Exam  Constitutional: Awake, alert, cooperative, no apparent distress, and appears stated age.  Eyes: Pupils equal, round and reactive to light, extra ocular muscles intact, sclera clear, conjunctiva normal.  HENT: Normocephalic, oral pharynx with moist mucus membranes, good dentition. No goiter appreciated.   Respiratory: Clear to auscultation bilaterally, no crackles or wheezing.  Cardiovascular: Regular rate and rhythm, normal S1 and S2, and no murmur noted.  Carotids +2, no bruits. No edema. Palpable pulses to radial  DP and PT arteries.   GI: Normal bowel sounds, soft, non-distended, non-tender, no masses palpated, no hepatosplenomegaly.  Surgical scars: ***  Lymph/Hematologic: No cervical lymphadenopathy and no supraclavicular lymphadenopathy.  Genitourinary:  ***  Skin: Warm and dry.  No rashes at anticipated surgical site.   Musculoskeletal: Full ROM of neck. There is no redness, warmth, or swelling of the joints. Gross motor strength is normal.    Neurologic: Awake, alert, oriented to name, place and time. Cranial nerves II-XII are grossly intact. Gait is normal. " "  Neuropsychiatric: Calm, cooperative. Normal affect.     Labs: (personally reviewed)  EKG: Personally reviewed but formal cardiology read pending: ***  Cardiac echo: ***  Stress test: ***  PFT's: ***    Outside records reviewed from: ***    ASSESSMENT and PLAN  Miriam Magana is a 68 year old female scheduled to undergo ***. {He/She:043766} has the following specific operative considerations:   - RCRI : {PREOP REVISED CARDIAC INDEX (RCI):905964::\"No serious cardiac risks\"}.  *** risk of major adverse cardiac event.   - Anesthesia considerations:  Refer to PAC assessment in anesthesia records  - VTE risk:  - MALIK # of risks ***/8 = ***  - Post-op delirium risk: ***  - If afib, [id number] score ***.  Risk category ***.    - Risk of PONV score = ***.  If > 2, anti-emetic intervention recommended.  - If on chronic opiates, morphine equivalent = *** (if > 60mg/24 hr--> needs pain team consult)    Patient was discussed with { PAC Providers:[id number]}.    Christian Mello MD  Preoperative Assessment Center  [facility]  [facility]  Phone: [phone]  Fax: [phone]  "

## 2018-05-01 NOTE — DISCHARGE INSTRUCTIONS

## 2018-05-01 NOTE — H&P
"  Pre-Operative H & P     CC:  Preoperative exam to assess for increased cardiopulmonary risk while undergoing surgery and anesthesia.    Date of Encounter: 5/1/2018  Primary Care Physician:  Yvon Lynn  Miriam Magana is a 68 year old female who presents for pre-operative H & P in preparation for *** with  *** on { :[id number]} at {[employer]128416::\"[facility]\",\"[facility]\",\"[facility] [place]\"}. ***    {   History obtained from:[id number]::\"History is obtained from the patient\"}.     Past Medical History  Past Medical History:   Diagnosis Date     Cirrhosis, biliary (H)     s/p Liver transplant in 1995     CKD (chronic kidney disease)      Depression      Diabetes type 2, controlled (H)      Esophagitis      Fibromyalgia      History of blood transfusion      Hypertension      Memory impairment      MALIK (obstructive sleep apnea)     CPAP     Other chronic pain     bilateral shoulder     Secondary adrenal insufficiency (H)      Urinary incontinence        Past Surgical History  Past Surgical History:   Procedure Laterality Date     C section X 2       ENDOSCOPIC RETROGRADE CHOLANGIOPANCREATOGRAM N/A 10/2/2014    Procedure: ENDOSCOPIC RETROGRADE CHOLANGIOPANCREATOGRAM;  Surgeon: Fernando West MD;  Location: [facility] OR     ENDOSCOPIC RETROGRADE CHOLANGIOPANCREATOGRAM N/A 9/14/2016    Procedure: ENDOSCOPIC RETROGRADE CHOLANGIOPANCREATOGRAM;  Surgeon: Fernando West MD;  Location:  OR     ENDOSCOPIC RETROGRADE CHOLANGIOPANCREATOGRAM N/A 7/5/2017    Procedure: COMBINED ENDOSCOPIC RETROGRADE CHOLANGIOPANCREATOGRAPHY, PLACE TUBE/STENT;  Endoscopic Retrograde Cholangiopancreatogram with Biliary Stone Removal, Biliary Duct Dilation, Biliary Duct Stent Placement X2;  Surgeon: Fernando West MD;  Location: [facility] OR     ENDOSCOPIC RETROGRADE CHOLANGIOPANCREATOGRAPHY, EXCHANGE " TUBE/STENT N/A 2017    Procedure: ENDOSCOPIC RETROGRADE CHOLANGIOPANCREATOGRAPHY, EXCHANGE TUBE/STENT;  Endoscopic Retrograde Cholangiopancreatogram with Stone Removal and Stent Exchange;  Surgeon: Fernando West MD;  Location: [facility] OR     ESOPHAGOSCOPY, GASTROSCOPY, DUODENOSCOPY (EGD), COMBINED N/A 10/2/2014    Procedure: COMBINED ESOPHAGOSCOPY, GASTROSCOPY, DUODENOSCOPY (EGD);  Surgeon: Fernando West MD;  Location: [facility] OR     ESOPHAGOSCOPY, GASTROSCOPY, DUODENOSCOPY (EGD), COMBINED N/A 8/10/2017    Procedure: COMBINED ESOPHAGOSCOPY, GASTROSCOPY, DUODENOSCOPY (EGD);  EGD;  Surgeon: Malu Morris MD;  Location: [facility] GI     REVERSE ARTHROPLASTY SHOULDER Left 2016    Procedure: REVERSE ARTHROPLASTY SHOULDER;  Surgeon: Sue Holcomb MD;  Location: [facility] OR     TRANSPLANT LIVER RECIPIENT  DONOR         Hx of Blood transfusions/reactions: ***     Hx of abnormal bleeding or anti-platelet use: ***    Menstrual history: No LMP recorded. Patient is postmenopausal.: ***    Steroid use in the last year: ***    Personal or FH with difficulty with Anesthesia:  ***    Prior to Admission Medications  Current Outpatient Prescriptions   Medication Sig Dispense Refill     acetaminophen (TYLENOL) 500 MG tablet Take 1,000 mg by mouth 2 times daily as needed for mild pain or fever        atorvastatin (LIPITOR) 40 MG tablet TAKE 1 TABLET BY MOUTH ONCE DAILY.       brexpiprazole (REXULTI) 0.5 MG tablet Take 1 mg by mouth daily (with breakfast)        buPROPion (WELLBUTRIN XL) 150 MG 24 hr tablet Take 150 mg by mouth every morning with 300mg total dose of 450mg       buPROPion (WELLBUTRIN XL) 300 MG 24 hr tablet Take 450 mg by mouth daily        BusPIRone HCl 30 MG TABS Take  by mouth 2 times daily.       calcium citrate-vitamin D (CITRACAL) 315-250 MG-UNIT TABS Take 2 tablets by mouth 2 times daily.       CELLCEPT 500 MG PO TABLET Take 2 tablets (1,000 mg) by mouth 2 times daily 60  tablet 11     cephALEXin (KEFLEX) 500 MG capsule Take 500 mg by mouth 3 times daily       cetirizine (ZYRTEC) 10 MG tablet TAKE 1 TABLET BY MOUTH ONCE DAILY.       Cholecalciferol (VITAMIN D) 2000 UNIT tablet Take 2 tablets by mouth daily AM       ferrous gluconate (FERGON) 324 (38 FE) MG tablet Take 1 tablet (324 mg) by mouth 3 times daily (with meals) 90 tablet 1     fluticasone (FLONASE) 50 MCG/ACT spray Spray 1 spray into both nostrils 2 times daily as needed for allergies        furosemide (LASIX) 40 MG tablet Take 1 tablet (40 mg) by mouth daily (Patient taking differently: Take 20 mg by mouth daily ) 90 tablet 3     gabapentin (NEURONTIN) 300 MG capsule Take 1 capsule (300 mg) by mouth 3 times daily 90 capsule 0     insulin glargine (LANTUS) 100 UNIT/ML PEN Start with 11 units of Lantus at night when you go home. Check your blood sugars at least twice a day at home and if you have hypoglycemia symptoms; please adjust insulin appropriately (Patient taking differently: Inject 55 Units Subcutaneous At Bedtime ) 3 mL 0     insulin lispro (HUMALOG KWIKPEN) 100 UNIT/ML soln Use 2.5 Units/15gm carbs with meals. Sliding scale as needed:   Do Not give Correction Insulin if Pre-Meal BG < 140.  For Pre-Meal  - 189 give 1 unit.  For Pre-Meal  - 239 give 2 units.  For Pre-Meal  - 289 give 3 units.  For Pre-Meal  - 339 give 4 units.  For Pre-Meal - 399 give 5 units.  For Pre-Meal -449 give 6 units For Pre-Meal BG = or > 450 give 7 units. (Patient taking differently: 16 Units 3 times daily (before meals) Use 3 Units/15gm carbs with meals. Sliding scale as needed:   Do Not give Correction Insulin if Pre-Meal BG < 140.  For Pre-Meal  - 189 give 1 unit.  For Pre-Meal  - 239 give 2 units.  For Pre-Meal  - 289 give 3 units.  For Pre-Meal  - 339 give 4 units.  For Pre-Meal - 399 give 5 units.  For Pre-Meal -449 give 6 units For Pre-Meal BG = or > 450 give 7  units.) 1 Month 0     LACTULOSE PO Take 10 g by mouth 3 times daily       levofloxacin (LEVAQUIN) 750 MG tablet Take 750 mg by mouth daily       LORazepam (ATIVAN) 1 MG tablet Take 0.5 tablets (0.5 mg) by mouth every 6 hours as needed (Patient taking differently: Take 1 mg by mouth At Bedtime ) 30 tablet 0     metFORMIN (GLUCOPHAGE) 500 MG tablet 750 mg at HS       multivitamin, therapeutic with minerals (THERA-VIT-M) TABS Take 1 tablet by mouth daily       omeprazole (PRILOSEC) 20 MG CR capsule Take 1 capsule (20 mg) by mouth daily 90 capsule 3     polyvinyl alcohol (ARTIFICIAL TEARS) 1.4 % ophthalmic solution Place 1 drop into both eyes as needed       pramipexole (MIRAPEX) 0.125 MG tablet Take 1-2 tablets (0.125-0.25 mg) by mouth At Bedtime (Patient taking differently: Take 0.5 mg by mouth At Bedtime ) 90 tablet 0     predniSONE (DELTASONE) 5 MG tablet Take 1 tablet (5 mg) by mouth daily 30 tablet 11     propranolol (INDERAL) 40 MG tablet Take 1 tablet (40 mg) by mouth 2 times daily (Patient taking differently: Take 20 mg by mouth 2 times daily ) 60 tablet 0     rifaximin (XIFAXAN) 550 MG TABS tablet Take 550 mg by mouth 2 times daily       senna-docusate (SENOKOT-S;PERICOLACE) 8.6-50 MG per tablet Take 1-2 tablets by mouth 2 times daily as needed for constipation (while taking narcotic pain medications) 60 tablet 1     sertraline (ZOLOFT) 100 MG tablet Take 200 mg by mouth daily AM       spironolactone (ALDACTONE) 25 MG tablet Take 25 mg by mouth daily       TRAMADOL HCL PO Take 25 mg by mouth every 6 hours as needed for moderate to severe pain       traZODone (DESYREL) 50 MG tablet Take 1-2 tablets ( mg) by mouth nightly as needed for sleep (Patient taking differently: Take 150 mg by mouth nightly as needed for sleep ) 60 tablet 0     ursodiol (ACTIGALL) 300 MG capsule Take 2 caps in a.m. And 1 cap in p.m. 270 capsule 3       Allergies  Allergies   Allergen Reactions     Blood Transfusion Related  (Informational Only) Other (See Comments)     Patient has a history of a clinically significant antibody against RBC antigens.  A delay in compatible RBCs may occur.  Patient has Big Anti E, anti Pulido-a and unidentified antibody.       Aspirin Other (See Comments)     due to liver transplant and high bleed risk     Lunesta 2mg [Eszopiclone] Other (See Comments)     Suicidal   vivid dreams     Morphine Other (See Comments)     Bad dreams     Pravastatin Unknown and Other (See Comments)     Escobar's        Social History  Social History     Social History     Marital status:      Spouse name: N/A     Number of children: N/A     Years of education: N/A     Occupational History     Not on file.     Social History Main Topics     Smoking status: Former Smoker     Types: Cigarettes     Quit date: 9/1/1991     Smokeless tobacco: Never Used      Comment: smoked for 15 years     Alcohol use No     Drug use: No     Sexual activity: Not on file     Other Topics Concern     Not on file     Social History Narrative       Family History  Family History   Problem Relation Age of Onset     Dementia Father      Dementia Mother      Heart Failure Mother      Myocardial Infarction Mother      Hypertension Mother      DIABETES Brother      CANCER Sister        Review of Systems  Preprocedure Note     Last edited 05/01/18 5596 by Rosalina May APRN CNS               Anesthesia Evaluation     . Pt has had prior anesthetic. Type: General and MAC    No history of anesthetic complications          ROS/MED HX    ENT/Pulmonary: Comment: 20 pack years, quit in 90s    (+)sleep apnea, other ENT- mouth sore-regurg of bile, tobacco use, Past use doesn't use CPAP mask doesn't fit cmH2O , recent URI unresolved Current bronchitis being treated with antibioitcs, occ Duoneb: . .    Neurologic:     (+)other neuro one episode of hepatic encephalopathy    Cardiovascular:     (+) Dyslipidemia, hypertension----. : . . . :. . Previous cardiac  testing date:results:date: results:ECG reviewed date: results: date: results:         (-) taking anticoagulants/antiplatelets   METS/Exercise Tolerance:  1 - Eating, dressing   Hematologic:     (+) History of Transfusion no previous transfusion reaction Other Hematologic Disorder-blood antibodies      Musculoskeletal:   (+) arthritis, , , other musculoskeletal- fibromyalgia      GI/Hepatic: Comment: Biliary cirrhosis with need for frequent large volume paracentesis, last 4/20/18 6 liters    (+) liver disease, Other GI/Hepatic s/p liver transplant; cholangitis; h/o gi bleed; esophagitis      Renal/Genitourinary:     (+) chronic renal disease, type: CRI, Pt does not require dialysis, Pt has no history of transplant,       Endo:     (+) type II DM Chronic steroid usage for Post Transplant Immunosuppression .      Psychiatric:     (+) psychiatric history depression      Infectious Disease:  - neg infectious disease ROS       Malignancy:      - no malignancy   Other:    (+) H/O Chronic Pain,                   Physical Exam      Airway   Mallampati: II  TM distance: >3 FB  Neck ROM: full    Dental   (+) upper dentures    Cardiovascular   Rhythm and rate: regular and normal      Pulmonary (+) rhonchi and rales       Other findings: For further details of assessment, testing, and physical exam please see H and P completed on same date.           PAC Discussion and Assessment    ASA Classification: 4  Case is suitable for:   Anesthetic techniques and relevant risks discussed: GA  Invasive monitoring and risk discussed:   Types:   Possibility and Risk of blood transfusion discussed:   NPO instructions given:   Additional anesthetic preparation and risks discussed:   Needs early admission to pre-op area:   Other:     PAC Resident/NP Anesthesia Assessment:  Patient is a 68 y.o. F with a complex medical history including recent pneumonia, biliary cirrhosis, chronic renal disease, and s/p liver transplant in 1995 who presents for  pre-evaluation prior to TIPS procedure.  Discussed with the patient that we recommend not proceeding with the TIPS procedure at this point given her recent pneumonia.  The risk of pulmonary complication with the anticipated procedure is high.  Recommended further optimization with primary care team prior to procedure.  Will call interventional radiology with recommendation to cancel current procedure.       Patient seen and discussed with Dr. Mello.   Orlando Savage Ca-1      Mid-Level Provider/Resident:   Date:   Time:     Attending Anesthesiologist Anesthesia Assessment:  I have examined the patient and reviewed the medical record.  I have discussed the patient with the LENCHO and concur with her assessment.  I have discussed the patient with the resident and concur with his assessment.  The patient is scheduled for TIPS for management of increasing ascites in the setting of remote liver transplant.  The patient developed a productive cough 3 weeks ago.  She has been taking amoxicillin clavulanate for 2 weeks and has noted little improvement.  She has cough productive of green sputum.  She denies fever (but is on chronic prednisone and CellCept)    PE:  Frail, icteric female in WC.  Frequent rattling cough.  Lungs diffuse crackles, rales, and rhonchi.  SpO2 99%  RR 20    Patient should have surgery postponed until 4 weeks after acute infection is cleared.  Discussed with IR  Discussed with GI  Encouraged Ms. Magana to return to her PCP.         Reviewed and Signed by PAC Anesthesiologist  Anesthesiologist: Christian Mello MD  Date: 5/1/2018  Time:   Pass/Fail:  Fail  Disposition:     PAC Pharmacist Assessment:        Pharmacist:   Date:   Time:                           .     Revision History       Date/Time User Provider Type Action    > 5/1/2018  3:45 PM Rosalina May APRN CNS Clinical Nurse Specialist Addend     5/1/2018  3:44 PM Rosalina May APRN CNS Clinical Nurse Specialist Addend     5/1/2018  3:43 PM  "Rosalina May APRN CNS Clinical Nurse Specialist Addend     5/1/2018  3:08 PM Christian Mello MD Physician Addend     5/1/2018  2:55 PM Orlando Savage MD Resident Addend     5/1/2018  2:52 PM Orlando Savage MD Resident Addend     5/1/2018  2:38 PM Rosalina May APRN CNS Clinical Nurse Specialist Sign                The complete review of systems is negative other than noted in the HPI or here.   Temp: 97.5  F (36.4  C)   BP: 107/74 Pulse: 102     SpO2: 99 %         182 lbs 15.7 oz  5' 4\"   Body mass index is 31.41 kg/(m^2).       Physical Exam  Constitutional: Awake, alert, cooperative, no apparent distress, and appears stated age.  Eyes: Pupils equal, round and reactive to light, extra ocular muscles intact, sclera clear, conjunctiva normal.  HENT: Normocephalic, oral pharynx with moist mucus membranes, good dentition. No goiter appreciated.   Respiratory: Clear to auscultation bilaterally, no crackles or wheezing.  Cardiovascular: Regular rate and rhythm, normal S1 and S2, and no murmur noted.  Carotids +2, no bruits. No edema. Palpable pulses to radial  DP and PT arteries.   GI: Normal bowel sounds, soft, non-distended, non-tender, no masses palpated, no hepatosplenomegaly.  Surgical scars: ***  Lymph/Hematologic: No cervical lymphadenopathy and no supraclavicular lymphadenopathy.  Genitourinary:  ***  Skin: Warm and dry.  No rashes at anticipated surgical site.   Musculoskeletal: Full ROM of neck. There is no redness, warmth, or swelling of the joints. Gross motor strength is normal.    Neurologic: Awake, alert, oriented to name, place and time. Cranial nerves II-XII are grossly intact. Gait is normal.   Neuropsychiatric: Calm, cooperative. Normal affect.     Labs: (personally reviewed)  EKG: Personally reviewed but formal cardiology read pending: ***  Cardiac echo: ***  Stress test: ***  PFT's: ***    Outside records reviewed from: ***    ASSESSMENT and PLAN  Miriam Magana is a 68 year old female " "scheduled to undergo ***. {He/She:151729} has the following specific operative considerations:   - RCRI : {PREOP REVISED CARDIAC INDEX (RCI):606415::\"No serious cardiac risks\"}.  *** risk of major adverse cardiac event.   - Anesthesia considerations:  Refer to PAC assessment in anesthesia records  - VTE risk:  - MALIK # of risks ***/8 = ***  - Post-op delirium risk: ***  - If afib, [id number] score ***.  Risk category ***.    - Risk of PONV score = ***.  If > 2, anti-emetic intervention recommended.  - If on chronic opiates, morphine equivalent = *** (if > 60mg/24 hr--> needs pain team consult)    Patient was discussed with { PAC Providers:[id number]}.    BERLIN Srivastava CNS  Preoperative Assessment Center  [facility]  [facility]  Phone: [phone]  Fax: [phone]  "

## 2018-05-01 NOTE — H&P
Pre-Operative H & P     CC:  Preoperative exam to assess for increased cardiopulmonary risk while undergoing surgery and anesthesia.    Date of Encounter: 5/1/2018  Primary Care Physician:  Yvon Lynn  Reason for visit: Biliary cirrhosis with ascites    HPI  Miriam Magana is a 68 year old female who presents for pre-operative H & P in preparation for TIPs procedure with Interventional Radiology on 5/2/18 at [facility]. History is obtained from the patient and family.     Patient with history of cirrhosis of the liver, s/p liver transplant in 1995. She continues to follow with Dr. Ureña, last seen in 3/2018. She has redeveloped cirrhosis, and has worsening ascites requiring repeated large volume paracentesis. She has had one episode of hepatic encephalopathy. She is now being referred to TIPs procedure.   Her history is otherwise significant for HLD, HTN, MALIK, DM II, CKD, and depression.  Patient presents today with productive cough, reporting that she has been on antibiotics for five days for bronchitis. She reports no improvement.   Past Medical History  Past Medical History:   Diagnosis Date     Cirrhosis, biliary (H)     s/p Liver transplant in 1995     CKD (chronic kidney disease)      Depression      Diabetes type 2, controlled (H)      Esophagitis      Fibromyalgia      History of blood transfusion      Hypertension      Memory impairment      MALIK (obstructive sleep apnea)     CPAP     Other chronic pain     bilateral shoulder     Secondary adrenal insufficiency (H)      Urinary incontinence        Past Surgical History  Past Surgical History:   Procedure Laterality Date     C section X 2       ENDOSCOPIC RETROGRADE CHOLANGIOPANCREATOGRAM N/A 10/2/2014    Procedure: ENDOSCOPIC RETROGRADE CHOLANGIOPANCREATOGRAM;  Surgeon: Fernando West MD;  Location: [facility] OR     ENDOSCOPIC RETROGRADE CHOLANGIOPANCREATOGRAM N/A 9/14/2016    Procedure: ENDOSCOPIC  RETROGRADE CHOLANGIOPANCREATOGRAM;  Surgeon: Fernando West MD;  Location: [facility] OR     ENDOSCOPIC RETROGRADE CHOLANGIOPANCREATOGRAM N/A 2017    Procedure: COMBINED ENDOSCOPIC RETROGRADE CHOLANGIOPANCREATOGRAPHY, PLACE TUBE/STENT;  Endoscopic Retrograde Cholangiopancreatogram with Biliary Stone Removal, Biliary Duct Dilation, Biliary Duct Stent Placement X2;  Surgeon: Fernando West MD;  Location: [facility] OR     ENDOSCOPIC RETROGRADE CHOLANGIOPANCREATOGRAPHY, EXCHANGE TUBE/STENT N/A 2017    Procedure: ENDOSCOPIC RETROGRADE CHOLANGIOPANCREATOGRAPHY, EXCHANGE TUBE/STENT;  Endoscopic Retrograde Cholangiopancreatogram with Stone Removal and Stent Exchange;  Surgeon: Fernando West MD;  Location: [facility] OR     ESOPHAGOSCOPY, GASTROSCOPY, DUODENOSCOPY (EGD), COMBINED N/A 10/2/2014    Procedure: COMBINED ESOPHAGOSCOPY, GASTROSCOPY, DUODENOSCOPY (EGD);  Surgeon: Fernando West MD;  Location: [facility] OR     ESOPHAGOSCOPY, GASTROSCOPY, DUODENOSCOPY (EGD), COMBINED N/A 8/10/2017    Procedure: COMBINED ESOPHAGOSCOPY, GASTROSCOPY, DUODENOSCOPY (EGD);  EGD;  Surgeon: Malu Morris MD;  Location: [facility] GI     REVERSE ARTHROPLASTY SHOULDER Left 2016    Procedure: REVERSE ARTHROPLASTY SHOULDER;  Surgeon: Sue Holcomb MD;  Location: [facility] OR     TRANSPLANT LIVER RECIPIENT  DONOR         Hx of Blood transfusions/reactions: Yes, in the past. Anti E and anti-Pulido-a and unidentified blood antibody    Hx of abnormal bleeding or anti-platelet use: Denies.     Menstrual history: No LMP recorded. Patient is postmenopausal.    Steroid use in the last year: Chronic Prednisone use.     Personal or FH with difficulty with Anesthesia:  Denies.     Prior to Admission Medications  Current Outpatient Prescriptions   Medication Sig Dispense Refill     acetaminophen (TYLENOL) 500 MG tablet Take 1,000 mg by mouth 2 times daily as needed for mild pain or fever        atorvastatin (LIPITOR)  40 MG tablet TAKE 1 TABLET BY MOUTH ONCE DAILY.       brexpiprazole (REXULTI) 0.5 MG tablet Take 1 mg by mouth daily (with breakfast)        buPROPion (WELLBUTRIN XL) 150 MG 24 hr tablet Take 150 mg by mouth every morning with 300mg total dose of 450mg       buPROPion (WELLBUTRIN XL) 300 MG 24 hr tablet Take 450 mg by mouth daily        BusPIRone HCl 30 MG TABS Take  by mouth 2 times daily.       calcium citrate-vitamin D (CITRACAL) 315-250 MG-UNIT TABS Take 2 tablets by mouth 2 times daily.       CELLCEPT 500 MG PO TABLET Take 2 tablets (1,000 mg) by mouth 2 times daily 60 tablet 11     cephALEXin (KEFLEX) 500 MG capsule Take 500 mg by mouth 3 times daily       cetirizine (ZYRTEC) 10 MG tablet TAKE 1 TABLET BY MOUTH ONCE DAILY.       Cholecalciferol (VITAMIN D) 2000 UNIT tablet Take 2 tablets by mouth daily AM       ferrous gluconate (FERGON) 324 (38 FE) MG tablet Take 1 tablet (324 mg) by mouth 3 times daily (with meals) 90 tablet 1     fluticasone (FLONASE) 50 MCG/ACT spray Spray 1 spray into both nostrils 2 times daily as needed for allergies        furosemide (LASIX) 40 MG tablet Take 1 tablet (40 mg) by mouth daily (Patient taking differently: Take 20 mg by mouth daily ) 90 tablet 3     gabapentin (NEURONTIN) 300 MG capsule Take 1 capsule (300 mg) by mouth 3 times daily 90 capsule 0     insulin glargine (LANTUS) 100 UNIT/ML PEN Start with 11 units of Lantus at night when you go home. Check your blood sugars at least twice a day at home and if you have hypoglycemia symptoms; please adjust insulin appropriately (Patient taking differently: Inject 55 Units Subcutaneous At Bedtime ) 3 mL 0     insulin lispro (HUMALOG KWIKPEN) 100 UNIT/ML soln Use 2.5 Units/15gm carbs with meals. Sliding scale as needed:   Do Not give Correction Insulin if Pre-Meal BG < 140.  For Pre-Meal  - 189 give 1 unit.  For Pre-Meal  - 239 give 2 units.  For Pre-Meal  - 289 give 3 units.  For Pre-Meal  - 339 give 4  units.  For Pre-Meal - 399 give 5 units.  For Pre-Meal -449 give 6 units For Pre-Meal BG = or > 450 give 7 units. (Patient taking differently: 16 Units 3 times daily (before meals) Use 3 Units/15gm carbs with meals. Sliding scale as needed:   Do Not give Correction Insulin if Pre-Meal BG < 140.  For Pre-Meal  - 189 give 1 unit.  For Pre-Meal  - 239 give 2 units.  For Pre-Meal  - 289 give 3 units.  For Pre-Meal  - 339 give 4 units.  For Pre-Meal - 399 give 5 units.  For Pre-Meal -449 give 6 units For Pre-Meal BG = or > 450 give 7 units.) 1 Month 0     LACTULOSE PO Take 10 g by mouth 3 times daily       levofloxacin (LEVAQUIN) 750 MG tablet Take 750 mg by mouth daily       LORazepam (ATIVAN) 1 MG tablet Take 0.5 tablets (0.5 mg) by mouth every 6 hours as needed (Patient taking differently: Take 1 mg by mouth At Bedtime ) 30 tablet 0     metFORMIN (GLUCOPHAGE) 500 MG tablet 750 mg at HS       multivitamin, therapeutic with minerals (THERA-VIT-M) TABS Take 1 tablet by mouth daily       omeprazole (PRILOSEC) 20 MG CR capsule Take 1 capsule (20 mg) by mouth daily 90 capsule 3     polyvinyl alcohol (ARTIFICIAL TEARS) 1.4 % ophthalmic solution Place 1 drop into both eyes as needed       pramipexole (MIRAPEX) 0.125 MG tablet Take 1-2 tablets (0.125-0.25 mg) by mouth At Bedtime (Patient taking differently: Take 0.5 mg by mouth At Bedtime ) 90 tablet 0     predniSONE (DELTASONE) 5 MG tablet Take 1 tablet (5 mg) by mouth daily 30 tablet 11     propranolol (INDERAL) 40 MG tablet Take 1 tablet (40 mg) by mouth 2 times daily (Patient taking differently: Take 20 mg by mouth 2 times daily ) 60 tablet 0     rifaximin (XIFAXAN) 550 MG TABS tablet Take 550 mg by mouth 2 times daily       senna-docusate (SENOKOT-S;PERICOLACE) 8.6-50 MG per tablet Take 1-2 tablets by mouth 2 times daily as needed for constipation (while taking narcotic pain medications) 60 tablet 1     sertraline (ZOLOFT)  100 MG tablet Take 200 mg by mouth daily AM       spironolactone (ALDACTONE) 25 MG tablet Take 25 mg by mouth daily       TRAMADOL HCL PO Take 25 mg by mouth every 6 hours as needed for moderate to severe pain       traZODone (DESYREL) 50 MG tablet Take 1-2 tablets ( mg) by mouth nightly as needed for sleep (Patient taking differently: Take 150 mg by mouth nightly as needed for sleep ) 60 tablet 0     ursodiol (ACTIGALL) 300 MG capsule Take 2 caps in a.m. And 1 cap in p.m. 270 capsule 3       Allergies  Allergies   Allergen Reactions     Blood Transfusion Related (Informational Only) Other (See Comments)     Patient has a history of a clinically significant antibody against RBC antigens.  A delay in compatible RBCs may occur.  Patient has Big Anti E, anti Pulido-a and unidentified antibody.       Aspirin Other (See Comments)     due to liver transplant and high bleed risk     Lunesta 2mg [Eszopiclone] Other (See Comments)     Suicidal   vivid dreams     Morphine Other (See Comments)     Bad dreams     Pravastatin Unknown and Other (See Comments)     Escobar's        Social History  Social History     Social History     Marital status:      Spouse name: N/A     Number of children: N/A     Years of education: N/A     Occupational History     Not on file.     Social History Main Topics     Smoking status: Former Smoker     Types: Cigarettes     Quit date: 9/1/1991     Smokeless tobacco: Never Used      Comment: smoked for 15 years     Alcohol use No     Drug use: No     Sexual activity: Not on file     Other Topics Concern     Not on file     Social History Narrative       Family History  Family History   Problem Relation Age of Onset     Dementia Father      Dementia Mother      Heart Failure Mother      Myocardial Infarction Mother      Hypertension Mother      DIABETES Brother      CANCER Sister        Review of Systems  ROS/MED HX      The complete review of systems is negative other than noted in the HPI  "or here.   ENT/Pulmonary: Comment: 20 pack years, quit in 90s    (+)sleep apnea, other ENT- mouth sore-regurg of bile, tobacco use, Past use doesn't use CPAP mask doesn't fit cmH2O , recent URI unresolved Current bronchitis/pneumonia being treated with antibioitcs, occ Duoneb: . .    Neurologic:     (+)other neuro one episode of hepatic encephalopathy    Cardiovascular:     (+) Dyslipidemia, hypertension----. : . . . :. . Previous cardiac testing date:results:date: results:ECG reviewed date: results: date: results:         (-) taking anticoagulants/antiplatelets   METS/Exercise Tolerance:  1 - Eating, dressing   Hematologic:     (+) History of Transfusion previous transfusion reaction -      Musculoskeletal:   (+) arthritis, , , other musculoskeletal- fibromyalgia      GI/Hepatic: Comment: Biliary cirrhosis with need for frequent large volume paracentesis, last 4/20/18 6 liters    (+) liver disease, Other GI/Hepatic s/p liver transplant; cholangitis; h/o gi bleed; esophagitis      Renal/Genitourinary:     (+) chronic renal disease, type: CRI, Pt does not require dialysis, Pt has no history of transplant,       Endo:     (+) type II DM Chronic steroid usage for Post Transplant Immunosuppression .      Psychiatric:     (+) psychiatric history depression      Infectious Disease:  - neg infectious disease ROS       Malignancy:      - no malignancy   Other:    (+) H/O Chronic Pain,         Temp: 97.5  F (36.4  C)   BP: 107/74 Pulse: 102     SpO2: 99 %         182 lbs 15.7 oz  5' 4\"   Body mass index is 31.41 kg/(m^2).       Physical Exam  Constitutional: Awake, alert, cooperative, no apparent distress, and appears stated age. In w/c. Accompanied by family.  Eyes: Pupils equal, round and reactive to light, extra ocular muscles intact, sclera clear, conjunctiva normal. Glasses on.  HENT: Normocephalic, oral pharynx with moist mucus membranes, good dentition. No goiter appreciated.   Respiratory: Coarse BS, with crackles " and rhonchi. Frequent productive cough.   Cardiovascular: Regular rate and rhythm, normal S1 and S2, and no murmur noted. Carotids +2, no bruits. 2+ bilateral lower extremity edema with compression stocking. Palpable pulses to radial  DP and PT arteries.   GI: Hypoactive bowel sounds, firm, distended abdomen. Nontender. Surgical scars: well healed. Umbilical hernia.   Lymph/Hematologic: No cervical lymphadenopathy and no supraclavicular lymphadenopathy.  Genitourinary: Deferred.   Skin: Warm and dry.    Musculoskeletal: Full ROM of neck. There is no redness, warmth, or swelling of the joints. Muscle wasting. Gross motor strength is limited.   Neurologic: Awake, alert, oriented to name, place and time. Cranial nerves II-XII are grossly intact. Gait is irregular.   Neuropsychiatric: Calm, cooperative. Flat affect.     Labs: (personally reviewed)  Lab Results   Component Value Date    WBC 6.6 05/01/2018     Lab Results   Component Value Date    RBC 4.43 05/01/2018     Lab Results   Component Value Date    HGB 11.8 05/01/2018     Lab Results   Component Value Date    HCT 37.1 05/01/2018     Lab Results   Component Value Date    MCV 84 05/01/2018     Lab Results   Component Value Date    MCH 26.6 05/01/2018     Lab Results   Component Value Date    MCHC 31.8 05/01/2018     Lab Results   Component Value Date    RDW 19.9 05/01/2018     Lab Results   Component Value Date     05/01/2018     Last Basic Metabolic Panel:  Lab Results   Component Value Date     05/01/2018      Lab Results   Component Value Date    POTASSIUM 4.6 05/01/2018     Lab Results   Component Value Date    CHLORIDE 105 05/01/2018     Lab Results   Component Value Date    LAURA 8.5 05/01/2018     Lab Results   Component Value Date    CO2 20 05/01/2018     Lab Results   Component Value Date    BUN 22 05/01/2018     Lab Results   Component Value Date    CR 1.14 05/01/2018     Lab Results   Component Value Date     05/01/2018     Lab Results    Component Value Date    AST 16 05/01/2018     Lab Results   Component Value Date    ALT 14 05/01/2018     Lab Results   Component Value Date    BILICONJ 0.0 04/25/2014      Lab Results   Component Value Date    BILITOTAL 1.0 05/01/2018     Lab Results   Component Value Date    ALBUMIN 2.4 05/01/2018     Lab Results   Component Value Date    PROTTOTAL 5.4 05/01/2018      Lab Results   Component Value Date    ALKPHOS 303 05/01/2018     EKG: Personally reviewed 6/23/17 Normal sinus rhythm, incomplete RBBB  Cardiac echo: 2015  Impressions:  Mild LV diastolic dysfunction, mild RV enlargement, otherwise normal echo.  US Liver 5/1/18   Impression:   1.  Biliary ductal dilation.  2.  Heterogeneous appearance of the liver  3.  Stable position of the common bile duct stent  4.  Large volume ascites  5.  Splenomegaly  CT abdomen/pelvis 5/1/18  IMPRESSION:   1. Moderate biliary dilatation, hyperdense contents in the central  biliary tree, biliary stent. Findings are relatively stable to  comparison exams.  2. Orthotopic liver transplant. Patent splenoportal axis.  3. Scattered areas of airspace opacities in the lower lungs, edema  versus atypical infection.  4. Large volume ascites.  5. Other incidental findings as described above.   Imaging reviewed by this provider    Outside records reviewed from: Care Everywhere    ASSESSMENT and PLAN  Miriam Magana is a 68 year old female scheduled to undergo TIPs procedure with Interventional Radiology on 5/2/18. She has the following specific operative considerations:   - RCRI : 0.9% risk of major adverse cardiac event.   - Anesthesia considerations:  Refer to PAC assessment in anesthesia records  - VTE risk: 0.5%  - Risk of PONV score = 3.  If > 2, anti-emetic intervention recommended. If 3 or > anti emetic intervention recommended with two or more meds    Very complex, ASA 4 patient with history of liver transplant in 1995, with redevelopment of cirrhosis, requiring repeated  paracentesis. She has had one episode of hepatic encephalopathy. Above procedure was planned. Her last paracentesis was reportedly yesterday with 6 liters removed. Family states that was half of the ascites present. Her other comorbidities include HLD, smoking history, MALIK, Diabetes mellitus II on insulin, CRI, depression and fibromyalgia. She has history of blood transfusion and multiple blood antibodies.   Her exam revealed course breath sounds with crackles and rhonchi. Frequent productive cough. She was breathing comfortably. She was also evaluated by Anesthesia who recommended that her procedure be postponed until she can have further evaluation and management of her lung condition. She and her family stated understanding, but asked if it would be possible for her to have a paracentesis before she goes home. Liver coordinators contacted and there was no availability. Discussed her resources near her home. She intends to contact her PCP.   Interventional Radiology, Dr. Ureña, and her nursing facility was contacted. They were going to contact patient's PCP.   All questions answered.    BERLIN Srivastava CNS  Preoperative Assessment Center  [facility]  Phone: [phone]  Fax: [phone]

## 2018-05-01 NOTE — ANESTHESIA PREPROCEDURE EVALUATION
Anesthesia Evaluation     . Pt has had prior anesthetic. Type: General and MAC    No history of anesthetic complications          ROS/MED HX    ENT/Pulmonary: Comment: 20 pack years, quit in 90s    (+)sleep apnea, other ENT- mouth sore-regurg of bile, tobacco use, Past use doesn't use CPAP mask doesn't fit cmH2O , recent URI unresolved Current bronchitis being treated with antibioitcs, occ Duoneb: . .    Neurologic:     (+)other neuro one episode of hepatic encephalopathy    Cardiovascular:     (+) Dyslipidemia, hypertension----. : . . . :. . Previous cardiac testing date:results:date: results:ECG reviewed date: results: date: results:         (-) taking anticoagulants/antiplatelets   METS/Exercise Tolerance:  1 - Eating, dressing   Hematologic:     (+) History of Transfusion no previous transfusion reaction Other Hematologic Disorder-blood antibodies      Musculoskeletal:   (+) arthritis, , , other musculoskeletal- fibromyalgia      GI/Hepatic: Comment: Biliary cirrhosis with need for frequent large volume paracentesis, last 4/20/18 6 liters    (+) liver disease, Other GI/Hepatic s/p liver transplant; cholangitis; h/o gi bleed; esophagitis      Renal/Genitourinary:     (+) chronic renal disease, type: CRI, Pt does not require dialysis, Pt has no history of transplant,       Endo:     (+) type II DM Chronic steroid usage for Post Transplant Immunosuppression .      Psychiatric:     (+) psychiatric history depression      Infectious Disease:  - neg infectious disease ROS       Malignancy:      - no malignancy   Other:    (+) H/O Chronic Pain,                   Physical Exam      Airway   Mallampati: II  TM distance: >3 FB  Neck ROM: full    Dental   (+) upper dentures    Cardiovascular   Rhythm and rate: regular and normal      Pulmonary (+) rhonchi and rales       Other findings: For further details of assessment, testing, and physical exam please see H and P completed on same date.           PAC Discussion and  Assessment    ASA Classification: 4  Case is suitable for:   Anesthetic techniques and relevant risks discussed: GA  Invasive monitoring and risk discussed:   Types:   Possibility and Risk of blood transfusion discussed:   NPO instructions given:   Additional anesthetic preparation and risks discussed:   Needs early admission to pre-op area:   Other:     PAC Resident/NP Anesthesia Assessment:  Patient is a 68 y.o. F with a complex medical history including recent pneumonia, biliary cirrhosis, chronic renal disease, and s/p liver transplant in 1995 who presents for pre-evaluation prior to TIPS procedure.  Discussed with the patient that we recommend not proceeding with the TIPS procedure at this point given her recent pneumonia.  The risk of pulmonary complication with the anticipated procedure is high.  Recommended further optimization with primary care team prior to procedure.  Will call interventional radiology with recommendation to cancel current procedure.       Patient seen and discussed with Dr. Mello.   Orlando Garcia      Mid-Level Provider/Resident:   Date:   Time:     Attending Anesthesiologist Anesthesia Assessment:  I have examined the patient and reviewed the medical record.  I have discussed the patient with the LENCHO and concur with her assessment.  I have discussed the patient with the resident and concur with his assessment.  The patient is scheduled for TIPS for management of increasing ascites in the setting of remote liver transplant.  The patient developed a productive cough 3 weeks ago.  She has been taking amoxicillin clavulanate for 2 weeks and has noted little improvement.  She has cough productive of green sputum.  She denies fever (but is on chronic prednisone and CellCept)    PE:  Frail, icteric female in WC.  Frequent rattling cough.  Lungs diffuse crackles, rales, and rhonchi.  SpO2 99%  RR 20    Patient should have surgery postponed until 4 weeks after acute infection is  cleared.  Discussed with IR  Discussed with GI  Encouraged Ms. Magana to return to her PCP.         Reviewed and Signed by PAC Anesthesiologist  Anesthesiologist: Christian Mello MD  Date: 5/1/2018  Time:   Pass/Fail:  Fail  Disposition:     PAC Pharmacist Assessment:        Pharmacist:   Date:   Time:                           .

## 2018-05-21 NOTE — TELEPHONE ENCOUNTER
Called and left a msg for pt regarding f/u on her last visit with Dr. Gill. The last conversation was that we had seen her for TIPS consult and then she was seen by PACS and was deemed not ready to move forward with TIPS as of yet.     I informed her that I was calling to follow up.     Left direct line for her to return my call.     Natalia Merchant, RN, BSN  Interventional Radiology Nurse Coordinator   Phone: [phone]

## 2018-05-24 NOTE — TELEPHONE ENCOUNTER
[facility] Call Center    Phone Message    May a detailed message be left on voicemail: yes    Reason for Call: Other: Malu called from [facility].  Patient wants to know if there is any hope?  She wants to know if the tips procedure is even worth it.  Patient met with palliative care recently.  Patient sees Dr Benton, a hospitalist at [facility].  They feel it would be best to have a provider to provider consult in regards to this patient first.  I informed them Dr Niranjan berrios in the clinic until 6/6 and they asked if another provider could call if available.  (Direct# to Dr Benton is [phone]).       Action Taken: Message routed to:  [facility] ([facility]

## 2018-06-21 ENCOUNTER — TELEPHONE (OUTPATIENT)
Dept: TRANSPLANT | Facility: CLINIC | Age: 68
End: 2018-06-21

## (undated) DEVICE — ENDO BITE BLOCK ADULT OMNI-BLOC

## (undated) DEVICE — BIOPSY VALVE BIOSHIELD 00711135

## (undated) DEVICE — ENDO DEVICE LOCKING AND BIOPSY CAP M00545261

## (undated) DEVICE — ENDO TUBING CO2 SMARTCAP STERILE DISP 100145CO2EXT

## (undated) DEVICE — PACK ENDOSCOPY GI CUSTOM UMMC

## (undated) DEVICE — KIT ENDO FIRST STEP DISINFECTANT 200ML W/POUCH EP-4

## (undated) DEVICE — SOL WATER IRRIG 1000ML BOTTLE 2F7114

## (undated) DEVICE — CATH RETRIEVAL BALLOON EXTRACTOR PRO RX-S INJ ABOVE 9-12MM

## (undated) DEVICE — WIRE GUIDE 0.025"X270CM ANG VISIGLIDE G-240-2527A

## (undated) DEVICE — ENDO CATH BALLOON DILATION HURRICANE 10MMX4X180CM M00545960

## (undated) DEVICE — ENDO SNARE POLYPECTOMY OVAL 15MM LOOP SD-240U-15

## (undated) DEVICE — TAPE CLOTH 3" CARDINAL 3TRCL03

## (undated) DEVICE — INFLATION DEVICE BIG 60 ENDO-AN6012

## (undated) DEVICE — WIRE GUIDE 0.025"X270CM STR VISIGLIDE G-240-2527S

## (undated) DEVICE — TAPE DURAPORE 3" SILK 1538-3

## (undated) DEVICE — Device

## (undated) RX ORDER — SIMETHICONE 40MG/0.6ML
SUSPENSION, DROPS(FINAL DOSAGE FORM)(ML) ORAL
Status: DISPENSED
Start: 2017-01-01

## (undated) RX ORDER — PHENYLEPHRINE HCL IN 0.9% NACL 1 MG/10 ML
SYRINGE (ML) INTRAVENOUS
Status: DISPENSED
Start: 2017-01-01

## (undated) RX ORDER — PROPOFOL 10 MG/ML
INJECTION, EMULSION INTRAVENOUS
Status: DISPENSED
Start: 2017-01-01

## (undated) RX ORDER — BUPIVACAINE HYDROCHLORIDE 5 MG/ML
INJECTION, SOLUTION EPIDURAL; INTRACAUDAL
Status: DISPENSED
Start: 2017-01-18

## (undated) RX ORDER — ONDANSETRON 2 MG/ML
INJECTION INTRAMUSCULAR; INTRAVENOUS
Status: DISPENSED
Start: 2017-01-01

## (undated) RX ORDER — FENTANYL CITRATE 50 UG/ML
INJECTION, SOLUTION INTRAMUSCULAR; INTRAVENOUS
Status: DISPENSED
Start: 2017-01-01

## (undated) RX ORDER — IOPAMIDOL 510 MG/ML
INJECTION, SOLUTION INTRAVASCULAR
Status: DISPENSED
Start: 2017-01-01

## (undated) RX ORDER — EPHEDRINE SULFATE 50 MG/ML
INJECTION, SOLUTION INTRAMUSCULAR; INTRAVENOUS; SUBCUTANEOUS
Status: DISPENSED
Start: 2017-01-01

## (undated) RX ORDER — GLYCOPYRROLATE 0.2 MG/ML
INJECTION, SOLUTION INTRAMUSCULAR; INTRAVENOUS
Status: DISPENSED
Start: 2017-01-01

## (undated) RX ORDER — LIDOCAINE HYDROCHLORIDE 20 MG/ML
INJECTION, SOLUTION EPIDURAL; INFILTRATION; INTRACAUDAL; PERINEURAL
Status: DISPENSED
Start: 2017-01-01